# Patient Record
Sex: MALE | Race: WHITE | NOT HISPANIC OR LATINO | Employment: OTHER | URBAN - METROPOLITAN AREA
[De-identification: names, ages, dates, MRNs, and addresses within clinical notes are randomized per-mention and may not be internally consistent; named-entity substitution may affect disease eponyms.]

---

## 2017-01-03 ENCOUNTER — HOSPITAL ENCOUNTER (OUTPATIENT)
Dept: LAB | Facility: MEDICAL CENTER | Age: 82
End: 2017-01-03
Attending: UROLOGY
Payer: MEDICARE

## 2017-01-03 LAB
ALBUMIN SERPL BCP-MCNC: 4.3 G/DL (ref 3.2–4.9)
ALBUMIN/GLOB SERPL: 2 G/DL
ALP SERPL-CCNC: 74 U/L (ref 30–99)
ALT SERPL-CCNC: 16 U/L (ref 2–50)
ANION GAP SERPL CALC-SCNC: 6 MMOL/L (ref 0–11.9)
AST SERPL-CCNC: 21 U/L (ref 12–45)
BASOPHILS # BLD AUTO: 0.05 K/UL (ref 0–0.12)
BASOPHILS NFR BLD AUTO: 1.2 % (ref 0–1.8)
BILIRUB SERPL-MCNC: 0.4 MG/DL (ref 0.1–1.5)
BUN SERPL-MCNC: 16 MG/DL (ref 8–22)
CALCIUM SERPL-MCNC: 9.6 MG/DL (ref 8.5–10.5)
CHLORIDE SERPL-SCNC: 105 MMOL/L (ref 96–112)
CO2 SERPL-SCNC: 30 MMOL/L (ref 20–33)
CREAT SERPL-MCNC: 0.99 MG/DL (ref 0.5–1.4)
EOSINOPHIL # BLD: 0.21 K/UL (ref 0–0.51)
EOSINOPHIL NFR BLD AUTO: 4.9 % (ref 0–6.9)
ERYTHROCYTE [DISTWIDTH] IN BLOOD BY AUTOMATED COUNT: 43.7 FL (ref 35.9–50)
GLOBULIN SER CALC-MCNC: 2.2 G/DL (ref 1.9–3.5)
GLUCOSE SERPL-MCNC: 80 MG/DL (ref 65–99)
HCT VFR BLD AUTO: 39.7 % (ref 42–52)
HGB BLD-MCNC: 13 G/DL (ref 14–18)
IMM GRANULOCYTES # BLD AUTO: 0.01 K/UL (ref 0–0.11)
IMM GRANULOCYTES NFR BLD AUTO: 0.2 % (ref 0–0.9)
LYMPHOCYTES # BLD: 0.6 K/UL (ref 1–4.8)
LYMPHOCYTES NFR BLD AUTO: 14 % (ref 22–41)
MCH RBC QN AUTO: 31.8 PG (ref 27–33)
MCHC RBC AUTO-ENTMCNC: 32.7 G/DL (ref 33.7–35.3)
MCV RBC AUTO: 97.1 FL (ref 81.4–97.8)
MONOCYTES # BLD: 0.58 K/UL (ref 0–0.85)
MONOCYTES NFR BLD AUTO: 13.5 % (ref 0–13.4)
NEUTROPHILS # BLD: 2.84 K/UL (ref 1.82–7.42)
NEUTROPHILS NFR BLD AUTO: 66.2 % (ref 44–72)
NRBC # BLD AUTO: 0 K/UL
NRBC BLD-RTO: 0 /100 WBC
PLATELET # BLD AUTO: 191 K/UL (ref 164–446)
PMV BLD AUTO: 9.5 FL (ref 9–12.9)
POTASSIUM SERPL-SCNC: 4.2 MMOL/L (ref 3.6–5.5)
PROT SERPL-MCNC: 6.5 G/DL (ref 6–8.2)
PSA SERPL DL<=0.01 NG/ML-MCNC: 0.02 NG/ML (ref 0–4)
RBC # BLD AUTO: 4.09 M/UL (ref 4.7–6.1)
SODIUM SERPL-SCNC: 141 MMOL/L (ref 135–145)
WBC # BLD AUTO: 4.3 K/UL (ref 4.8–10.8)

## 2017-01-03 PROCEDURE — 85025 COMPLETE CBC W/AUTO DIFF WBC: CPT

## 2017-01-03 PROCEDURE — 36415 COLL VENOUS BLD VENIPUNCTURE: CPT

## 2017-01-03 PROCEDURE — 84153 ASSAY OF PSA TOTAL: CPT

## 2017-01-03 PROCEDURE — 80053 COMPREHEN METABOLIC PANEL: CPT

## 2017-02-15 ENCOUNTER — HOSPITAL ENCOUNTER (OUTPATIENT)
Dept: RADIOLOGY | Facility: MEDICAL CENTER | Age: 82
End: 2017-02-15
Attending: NURSE PRACTITIONER
Payer: MEDICARE

## 2017-02-15 ENCOUNTER — OFFICE VISIT (OUTPATIENT)
Dept: MEDICAL GROUP | Facility: MEDICAL CENTER | Age: 82
End: 2017-02-15
Payer: MEDICARE

## 2017-02-15 VITALS
HEART RATE: 66 BPM | WEIGHT: 148 LBS | BODY MASS INDEX: 25.27 KG/M2 | TEMPERATURE: 97.4 F | HEIGHT: 64 IN | DIASTOLIC BLOOD PRESSURE: 78 MMHG | RESPIRATION RATE: 16 BRPM | SYSTOLIC BLOOD PRESSURE: 122 MMHG | OXYGEN SATURATION: 95 %

## 2017-02-15 DIAGNOSIS — G89.29 CHRONIC PAIN OF RIGHT KNEE: ICD-10-CM

## 2017-02-15 DIAGNOSIS — M25.561 CHRONIC PAIN OF RIGHT KNEE: ICD-10-CM

## 2017-02-15 DIAGNOSIS — Z95.0 CARDIAC PACEMAKER IN SITU: ICD-10-CM

## 2017-02-15 DIAGNOSIS — Z85.46 HISTORY OF PROSTATE CANCER: ICD-10-CM

## 2017-02-15 PROCEDURE — 1036F TOBACCO NON-USER: CPT | Performed by: NURSE PRACTITIONER

## 2017-02-15 PROCEDURE — G8432 DEP SCR NOT DOC, RNG: HCPCS | Performed by: NURSE PRACTITIONER

## 2017-02-15 PROCEDURE — 4040F PNEUMOC VAC/ADMIN/RCVD: CPT | Performed by: NURSE PRACTITIONER

## 2017-02-15 PROCEDURE — 99213 OFFICE O/P EST LOW 20 MIN: CPT | Performed by: NURSE PRACTITIONER

## 2017-02-15 PROCEDURE — G8482 FLU IMMUNIZE ORDER/ADMIN: HCPCS | Performed by: NURSE PRACTITIONER

## 2017-02-15 PROCEDURE — 1101F PT FALLS ASSESS-DOCD LE1/YR: CPT | Performed by: NURSE PRACTITIONER

## 2017-02-15 PROCEDURE — G8420 CALC BMI NORM PARAMETERS: HCPCS | Performed by: NURSE PRACTITIONER

## 2017-02-15 PROCEDURE — 73564 X-RAY EXAM KNEE 4 OR MORE: CPT | Mod: RT

## 2017-02-15 NOTE — PROGRESS NOTES
"Subjective:      Chico Cedillo is a 84 y.o. male who presents with Other            Other    Chico Cedillo is a patient of Dr. Tanner here today for new problem with right knee pain.      1. Chronic pain of right knee  Patient reports he believes symptoms started about 5 months ago without history of trauma. He states the episodes of knee pain are \"infrequent\" and he could not tell me how often her or if they are sharp, dull or achy pains. 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 The pain is mostly on the anterior portion of the patella. He cannot say how long the pain lasts. He is here mostly today because his family advised him to do so. He states it does not give out or lock up on him. There has been no redness or swelling. The pain does not radiate into the leg. He is not currently having symptoms.    2. History of prostate cancer  Patient reports he continues to see  in urology on a quarterly basis. Lab work is being done by their office.    3. Cardiac pacemaker in situ  Patient follows up with his cardiologist for pacemaker checks regularly. He reports no problems with this.    No current outpatient prescriptions on file.     No current facility-administered medications for this visit.     Social History   Substance Use Topics   • Smoking status: Never Smoker    • Smokeless tobacco: Never Used   • Alcohol Use: No     Family History   Problem Relation Age of Onset   • Heart Disease Father      Past Medical History   Diagnosis Date   • Cancer ([facility])      PROSTATE   • Diarrhea        Review of Systems   Musculoskeletal: Positive for joint pain.   All other systems reviewed and are negative.         Objective:     /78 mmHg  Pulse 66  Temp(Src) 36.3 °C (97.4 °F)  Resp 16  Ht 1.626 m (5' 4.02\")  Wt 67.132 kg (148 lb)  BMI 25.39 kg/m2  SpO2 95%     Physical Exam   Constitutional: He is oriented to person, place, and time. He appears " well-developed and well-nourished. No distress.   HENT:   Head: Normocephalic and atraumatic.   Right Ear: External ear normal.   Left Ear: External ear normal.   Nose: Nose normal.   Mouth/Throat: Oropharynx is clear and moist.   Eyes: Conjunctivae are normal. Right eye exhibits no discharge. Left eye exhibits no discharge.   Neck: Normal range of motion. Neck supple. No tracheal deviation present. No thyromegaly present.   Cardiovascular: Normal rate, regular rhythm and normal heart sounds.    No murmur heard.  Pulmonary/Chest: Effort normal and breath sounds normal. No respiratory distress. He has no wheezes. He has no rales.   Musculoskeletal:        Right knee: He exhibits normal range of motion, no swelling and no effusion. No tenderness found.   Patient points to his anterior patellar area as to where the pain occurs but there is no problems with examination of the leg and patella today.   Lymphadenopathy:     He has no cervical adenopathy.   Neurological: He is alert and oriented to person, place, and time. Coordination normal.   Skin: Skin is warm and dry. No rash noted. He is not diaphoretic. No erythema.   Psychiatric: He has a normal mood and affect. His behavior is normal. Judgment and thought content normal.   Nursing note and vitals reviewed.              Assessment/Plan:     1. Chronic pain of right knee  I discussed options with patient and he will go downstairs today for x-ray which I expect will show some arthritis. I reviewed with him options including physical therapy or referral to orthopedics to look into injections but he declined both. I advised him then to at least rest the knee more often when he is working since this seems to be the trigger. He may also try ice and elevation. He may use over-the-counter Aleve or ibuprofen but not on a regular basis because of his age. He will consider further treatment options if symptoms worsen.  - DX-KNEE COMPLETE 4+ RIGHT; Future    2. History of  prostate cancer  Patient will continue to follow with his urologist on a quarterly basis.    3. Cardiac pacemaker in situ  Patient will continue to follow with cardiology.

## 2017-02-15 NOTE — MR AVS SNAPSHOT
"Chico Cedillo   2/15/2017 8:20 AM   Office Visit   MRN: [id number]    Department:  [address] [place] [address]   Dept Phone:  [phone]    Description:  Male : 1932   Provider:  LAZARUS LozanoPANSON.           Reason for Visit     Other knee problem, right one       Allergies as of 2/15/2017     Allergen Noted Reactions    Nkda [No Known Drug Allergy] 2008         You were diagnosed with     Chronic pain of right knee   [4071903]       History of prostate cancer   [063894]         Vital Signs     Blood Pressure Pulse Temperature Respirations Height Weight    122/78 mmHg 66 36.3 °C (97.4 °F) 16 1.626 m (5' 4.02\") 67.132 kg (148 lb)    Body Mass Index Oxygen Saturation Smoking Status             25.39 kg/m2 95% Never Smoker          Basic Information     Date Of Birth Sex Race Ethnicity Preferred Language    1932 Male White Unknown English      Problem List              ICD-10-CM Priority Class Noted - Resolved    Cardiac pacemaker in situ Z95.0   2012 - Present    History of prostate cancer Z85.46   10/4/2016 - Present    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   10/4/2016 - Present      Health Maintenance        Date Due Completion Dates    IMM ZOSTER VACCINE 2018 (Originally 1992) ---    IMM PNEUMOCOCCAL 65+ (ADULT) LOW/MEDIUM RISK SERIES (2 of 2 - PPSV23) 2017, 2015    IMM DTaP/Tdap/Td Vaccine (2 - Td) 10/3/2021 10/3/2011            Current Immunizations     13-VALENT PCV PREVNAR 2016, 2015    Influenza Vaccine Adult HD 10/19/2005, 10/13/2004    Influenza Vaccine Quad Inj (Pf) 2013, 2010    Influenza Vaccine Quad Inj (Preserved) 2016, 10/7/2015, 2014, 10/4/2012, 10/3/2011    Tdap Vaccine 10/3/2011      Below and/or attached are the medications your provider expects you to take. Review all of your home medications and newly ordered medications with your provider and/or pharmacist. Follow medication instructions as directed by your provider and/or " pharmacist. Please keep your medication list with you and share with your provider. Update the information when medications are discontinued, doses are changed, or new medications (including over-the-counter products) are added; and carry medication information at all times in the event of emergency situations     Allergies:  NKDA - (reactions not documented)               Medications  Valid as of: February 15, 2017 -  8:23 AM    Generic Name Brand Name Tablet Size Instructions for use    .                 Medicines prescribed today were sent to:     [id number] - [place], [place] - [address]    [address] [facility] [address]    Phone: [phone] Fax: [phone]    Open 24 Hours?: No      Medication refill instructions:       If your prescription bottle indicates you have medication refills left, it is not necessary to call your provider’s office. Please contact your pharmacy and they will refill your medication.    If your prescription bottle indicates you do not have any refills left, you may request refills at any time through one of the following ways: The online [employer] system (except Urgent Care), by calling your provider’s office, or by asking your pharmacy to contact your provider’s office with a refill request. Medication refills are processed only during regular business hours and may not be available until the next business day. Your provider may request additional information or to have a follow-up visit with you prior to refilling your medication.   *Please Note: Medication refills are assigned a new Rx number when refilled electronically. Your pharmacy may indicate that no refills were authorized even though a new prescription for the same medication is available at the pharmacy. Please request the medicine by name with the pharmacy before contacting your provider for a refill.        Your To Do List     Future Labs/Procedures Complete By Expires    DX-KNEE COMPLETE 4+ RIGHT  As directed  8/18/2017         Cert[employer] Access Code: Activation code not generated  Current Certess Status: Active

## 2017-03-02 ENCOUNTER — HOSPITAL ENCOUNTER (OUTPATIENT)
Dept: LAB | Facility: MEDICAL CENTER | Age: 82
End: 2017-03-02
Attending: UROLOGY
Payer: MEDICARE

## 2017-03-02 LAB — PSA SERPL DL<=0.01 NG/ML-MCNC: 0.01 NG/ML (ref 0–4)

## 2017-03-02 PROCEDURE — 36415 COLL VENOUS BLD VENIPUNCTURE: CPT

## 2017-03-02 PROCEDURE — 84153 ASSAY OF PSA TOTAL: CPT

## 2017-06-05 ENCOUNTER — HOSPITAL ENCOUNTER (OUTPATIENT)
Dept: LAB | Facility: MEDICAL CENTER | Age: 82
End: 2017-06-05
Attending: UROLOGY
Payer: MEDICARE

## 2017-06-05 LAB — PSA SERPL-MCNC: 0.84 NG/ML (ref 0–4)

## 2017-06-05 PROCEDURE — 36415 COLL VENOUS BLD VENIPUNCTURE: CPT

## 2017-06-05 PROCEDURE — 84153 ASSAY OF PSA TOTAL: CPT

## 2017-06-30 PROBLEM — D04.21 CARCINOMA IN SITU OF SKIN OF RIGHT EAR AND EXTERNAL AURICULAR CANAL: Status: ACTIVE | Noted: 2017-06-30

## 2017-07-14 PROBLEM — D49.2 NEOPLASM OF UNSPECIFIED BEHAVIOR OF BONE, SOFT TISSUE, AND SKIN: Status: RESOLVED | Noted: 2017-06-30 | Resolved: 2017-07-14

## 2017-08-28 PROBLEM — C44.222 SQUAMOUS CELL CARCINOMA OF SKIN OF RIGHT EAR AND EXTERNAL AURICULAR CANAL: Status: ACTIVE | Noted: 2017-08-28

## 2017-08-29 ENCOUNTER — HOSPITAL ENCOUNTER (OUTPATIENT)
Dept: LAB | Facility: MEDICAL CENTER | Age: 82
End: 2017-08-29
Attending: PHYSICIAN ASSISTANT
Payer: MEDICARE

## 2017-08-29 LAB — PSA SERPL-MCNC: 2.71 NG/ML (ref 0–4)

## 2017-08-29 PROCEDURE — 36415 COLL VENOUS BLD VENIPUNCTURE: CPT | Mod: GA

## 2017-08-29 PROCEDURE — 84153 ASSAY OF PSA TOTAL: CPT | Mod: GA

## 2017-09-25 ENCOUNTER — APPOINTMENT (RX ONLY)
Dept: URBAN - METROPOLITAN AREA CLINIC 36 | Facility: CLINIC | Age: 82
Setting detail: DERMATOLOGY
End: 2017-09-25

## 2017-09-25 DIAGNOSIS — Z48.817 ENCOUNTER FOR SURGICAL AFTERCARE FOLLOWING SURGERY ON THE SKIN AND SUBCUTANEOUS TISSUE: ICD-10-CM

## 2017-09-25 PROCEDURE — ? POST-OP WOUND EVALUATION

## 2017-09-25 PROCEDURE — 99024 POSTOP FOLLOW-UP VISIT: CPT

## 2017-09-25 ASSESSMENT — LOCATION SIMPLE DESCRIPTION DERM: LOCATION SIMPLE: RIGHT EAR

## 2017-09-25 ASSESSMENT — LOCATION ZONE DERM: LOCATION ZONE: EAR

## 2017-09-25 ASSESSMENT — LOCATION DETAILED DESCRIPTION DERM: LOCATION DETAILED: RIGHT SCAPHA

## 2017-09-25 NOTE — PROCEDURE: POST-OP WOUND EVALUATION
Wound Evaluated By (Optional): Dr. Madden
Wound Diameter In Cm(Optional): 0
Detail Level: Detailed
Add 79347 Cpt? (Important Note: In 2017 The Use Of 09277 Is Being Tracked By Cms To Determine Future Global Period Reimbursement For Global Periods): yes
Body Location Override (Optional): Mohs site right anti helix

## 2017-10-09 ENCOUNTER — HOSPITAL ENCOUNTER (OUTPATIENT)
Dept: LAB | Facility: MEDICAL CENTER | Age: 82
End: 2017-10-09
Attending: UROLOGY
Payer: MEDICARE

## 2017-10-09 LAB — PSA SERPL-MCNC: 3.32 NG/ML (ref 0–4)

## 2017-10-09 PROCEDURE — 36415 COLL VENOUS BLD VENIPUNCTURE: CPT

## 2017-10-09 PROCEDURE — 84153 ASSAY OF PSA TOTAL: CPT

## 2018-04-25 ENCOUNTER — HOSPITAL ENCOUNTER (OUTPATIENT)
Dept: LAB | Facility: MEDICAL CENTER | Age: 83
End: 2018-04-25
Attending: RADIOLOGY
Payer: MEDICARE

## 2018-04-25 LAB — PSA SERPL-MCNC: 5.15 NG/ML (ref 0–4)

## 2018-04-25 PROCEDURE — 36415 COLL VENOUS BLD VENIPUNCTURE: CPT

## 2018-04-25 PROCEDURE — 84153 ASSAY OF PSA TOTAL: CPT

## 2018-07-23 ENCOUNTER — HOSPITAL ENCOUNTER (OUTPATIENT)
Dept: LAB | Facility: MEDICAL CENTER | Age: 83
End: 2018-07-23
Attending: INTERNAL MEDICINE
Payer: MEDICARE

## 2018-07-23 LAB
ALBUMIN SERPL BCP-MCNC: 4.1 G/DL (ref 3.2–4.9)
ALBUMIN/GLOB SERPL: 1.7 G/DL
ALP SERPL-CCNC: 77 U/L (ref 30–99)
ALT SERPL-CCNC: 12 U/L (ref 2–50)
ANION GAP SERPL CALC-SCNC: 5 MMOL/L (ref 0–11.9)
AST SERPL-CCNC: 18 U/L (ref 12–45)
BASOPHILS # BLD AUTO: 1 % (ref 0–1.8)
BASOPHILS # BLD: 0.04 K/UL (ref 0–0.12)
BILIRUB SERPL-MCNC: 0.5 MG/DL (ref 0.1–1.5)
BUN SERPL-MCNC: 17 MG/DL (ref 8–22)
CALCIUM SERPL-MCNC: 8.9 MG/DL (ref 8.5–10.5)
CHLORIDE SERPL-SCNC: 108 MMOL/L (ref 96–112)
CHOLEST SERPL-MCNC: 139 MG/DL (ref 100–199)
CO2 SERPL-SCNC: 29 MMOL/L (ref 20–33)
CREAT SERPL-MCNC: 1.05 MG/DL (ref 0.5–1.4)
EOSINOPHIL # BLD AUTO: 0.3 K/UL (ref 0–0.51)
EOSINOPHIL NFR BLD: 7.5 % (ref 0–6.9)
ERYTHROCYTE [DISTWIDTH] IN BLOOD BY AUTOMATED COUNT: 47.4 FL (ref 35.9–50)
GLOBULIN SER CALC-MCNC: 2.4 G/DL (ref 1.9–3.5)
GLUCOSE SERPL-MCNC: 89 MG/DL (ref 65–99)
HCT VFR BLD AUTO: 40.3 % (ref 42–52)
HDLC SERPL-MCNC: 38 MG/DL
HGB BLD-MCNC: 13.4 G/DL (ref 14–18)
IMM GRANULOCYTES # BLD AUTO: 0.01 K/UL (ref 0–0.11)
IMM GRANULOCYTES NFR BLD AUTO: 0.3 % (ref 0–0.9)
LDLC SERPL CALC-MCNC: 81 MG/DL
LYMPHOCYTES # BLD AUTO: 0.61 K/UL (ref 1–4.8)
LYMPHOCYTES NFR BLD: 15.3 % (ref 22–41)
MCH RBC QN AUTO: 32.1 PG (ref 27–33)
MCHC RBC AUTO-ENTMCNC: 33.3 G/DL (ref 33.7–35.3)
MCV RBC AUTO: 96.6 FL (ref 81.4–97.8)
MONOCYTES # BLD AUTO: 0.75 K/UL (ref 0–0.85)
MONOCYTES NFR BLD AUTO: 18.8 % (ref 0–13.4)
NEUTROPHILS # BLD AUTO: 2.28 K/UL (ref 1.82–7.42)
NEUTROPHILS NFR BLD: 57.1 % (ref 44–72)
NRBC # BLD AUTO: 0 K/UL
NRBC BLD-RTO: 0 /100 WBC
PLATELET # BLD AUTO: 167 K/UL (ref 164–446)
PMV BLD AUTO: 9.8 FL (ref 9–12.9)
POTASSIUM SERPL-SCNC: 4 MMOL/L (ref 3.6–5.5)
PROT SERPL-MCNC: 6.5 G/DL (ref 6–8.2)
RBC # BLD AUTO: 4.17 M/UL (ref 4.7–6.1)
SODIUM SERPL-SCNC: 142 MMOL/L (ref 135–145)
TRIGL SERPL-MCNC: 101 MG/DL (ref 0–149)
WBC # BLD AUTO: 4 K/UL (ref 4.8–10.8)

## 2018-07-23 PROCEDURE — 36415 COLL VENOUS BLD VENIPUNCTURE: CPT

## 2018-07-23 PROCEDURE — 80061 LIPID PANEL: CPT

## 2018-07-23 PROCEDURE — 85025 COMPLETE CBC W/AUTO DIFF WBC: CPT

## 2018-07-23 PROCEDURE — 80053 COMPREHEN METABOLIC PANEL: CPT

## 2018-11-21 ENCOUNTER — HOSPITAL ENCOUNTER (OUTPATIENT)
Dept: LAB | Facility: MEDICAL CENTER | Age: 83
End: 2018-11-21
Attending: RADIOLOGY
Payer: MEDICARE

## 2018-11-21 LAB — PSA SERPL-MCNC: 6.95 NG/ML (ref 0–4)

## 2018-11-21 PROCEDURE — 36415 COLL VENOUS BLD VENIPUNCTURE: CPT

## 2018-11-21 PROCEDURE — 84153 ASSAY OF PSA TOTAL: CPT

## 2018-12-17 ENCOUNTER — HOSPITAL ENCOUNTER (OUTPATIENT)
Dept: LAB | Facility: MEDICAL CENTER | Age: 83
End: 2018-12-17
Attending: UROLOGY
Payer: MEDICARE

## 2018-12-17 LAB
PSA SERPL-MCNC: 6.88 NG/ML (ref 0–4)
TESTOST SERPL-MCNC: 458 NG/DL (ref 175–781)

## 2018-12-17 PROCEDURE — 84153 ASSAY OF PSA TOTAL: CPT

## 2018-12-17 PROCEDURE — 84403 ASSAY OF TOTAL TESTOSTERONE: CPT

## 2018-12-17 PROCEDURE — 36415 COLL VENOUS BLD VENIPUNCTURE: CPT

## 2018-12-20 ENCOUNTER — HOSPITAL ENCOUNTER (OUTPATIENT)
Dept: RADIOLOGY | Facility: MEDICAL CENTER | Age: 83
End: 2018-12-20
Attending: UROLOGY
Payer: MEDICARE

## 2018-12-20 DIAGNOSIS — C61 MALIGNANT NEOPLASM OF PROSTATE (HCC): ICD-10-CM

## 2018-12-20 DIAGNOSIS — Z19.1 MALIGNANT NEOPLASM WITH HORMONE SENSITIVE STATUS: ICD-10-CM

## 2018-12-20 PROCEDURE — 78306 BONE IMAGING WHOLE BODY: CPT

## 2019-05-16 ENCOUNTER — HOSPITAL ENCOUNTER (OUTPATIENT)
Dept: RADIOLOGY | Facility: MEDICAL CENTER | Age: 84
End: 2019-05-16
Attending: INTERNAL MEDICINE | Admitting: INTERNAL MEDICINE
Payer: MEDICARE

## 2019-05-16 DIAGNOSIS — Z01.811 PRE-OPERATIVE RESPIRATORY EXAMINATION: ICD-10-CM

## 2019-05-16 DIAGNOSIS — Z01.812 PRE-OPERATIVE LABORATORY EXAMINATION: ICD-10-CM

## 2019-05-16 LAB
ANION GAP SERPL CALC-SCNC: 6 MMOL/L (ref 0–11.9)
APTT PPP: 30.8 SEC (ref 24.7–36)
BASOPHILS # BLD AUTO: 1.2 % (ref 0–1.8)
BASOPHILS # BLD: 0.05 K/UL (ref 0–0.12)
BUN SERPL-MCNC: 18 MG/DL (ref 8–22)
CALCIUM SERPL-MCNC: 8.8 MG/DL (ref 8.5–10.5)
CHLORIDE SERPL-SCNC: 108 MMOL/L (ref 96–112)
CO2 SERPL-SCNC: 27 MMOL/L (ref 20–33)
CREAT SERPL-MCNC: 1.11 MG/DL (ref 0.5–1.4)
EOSINOPHIL # BLD AUTO: 0.45 K/UL (ref 0–0.51)
EOSINOPHIL NFR BLD: 11.2 % (ref 0–6.9)
ERYTHROCYTE [DISTWIDTH] IN BLOOD BY AUTOMATED COUNT: 46.2 FL (ref 35.9–50)
GLUCOSE SERPL-MCNC: 82 MG/DL (ref 65–99)
HCT VFR BLD AUTO: 41.6 % (ref 42–52)
HGB BLD-MCNC: 13.9 G/DL (ref 14–18)
IMM GRANULOCYTES # BLD AUTO: 0.01 K/UL (ref 0–0.11)
IMM GRANULOCYTES NFR BLD AUTO: 0.2 % (ref 0–0.9)
INR PPP: 1.03 (ref 0.87–1.13)
LYMPHOCYTES # BLD AUTO: 0.89 K/UL (ref 1–4.8)
LYMPHOCYTES NFR BLD: 22.1 % (ref 22–41)
MCH RBC QN AUTO: 32.4 PG (ref 27–33)
MCHC RBC AUTO-ENTMCNC: 33.4 G/DL (ref 33.7–35.3)
MCV RBC AUTO: 97 FL (ref 81.4–97.8)
MONOCYTES # BLD AUTO: 0.48 K/UL (ref 0–0.85)
MONOCYTES NFR BLD AUTO: 11.9 % (ref 0–13.4)
NEUTROPHILS # BLD AUTO: 2.15 K/UL (ref 1.82–7.42)
NEUTROPHILS NFR BLD: 53.4 % (ref 44–72)
NRBC # BLD AUTO: 0 K/UL
NRBC BLD-RTO: 0 /100 WBC
PLATELET # BLD AUTO: 181 K/UL (ref 164–446)
PMV BLD AUTO: 9 FL (ref 9–12.9)
POTASSIUM SERPL-SCNC: 4.2 MMOL/L (ref 3.6–5.5)
PROTHROMBIN TIME: 13.6 SEC (ref 12–14.6)
RBC # BLD AUTO: 4.29 M/UL (ref 4.7–6.1)
SODIUM SERPL-SCNC: 141 MMOL/L (ref 135–145)
WBC # BLD AUTO: 4 K/UL (ref 4.8–10.8)

## 2019-05-16 PROCEDURE — 85025 COMPLETE CBC W/AUTO DIFF WBC: CPT

## 2019-05-16 PROCEDURE — 71046 X-RAY EXAM CHEST 2 VIEWS: CPT

## 2019-05-16 PROCEDURE — 85610 PROTHROMBIN TIME: CPT

## 2019-05-16 PROCEDURE — 85730 THROMBOPLASTIN TIME PARTIAL: CPT

## 2019-05-16 PROCEDURE — 80048 BASIC METABOLIC PNL TOTAL CA: CPT

## 2019-05-16 PROCEDURE — 36415 COLL VENOUS BLD VENIPUNCTURE: CPT

## 2019-05-21 ENCOUNTER — HOSPITAL ENCOUNTER (OUTPATIENT)
Facility: MEDICAL CENTER | Age: 84
End: 2019-05-21
Attending: INTERNAL MEDICINE | Admitting: INTERNAL MEDICINE
Payer: MEDICARE

## 2019-05-21 ENCOUNTER — APPOINTMENT (OUTPATIENT)
Dept: CARDIOLOGY | Facility: MEDICAL CENTER | Age: 84
End: 2019-05-21
Attending: INTERNAL MEDICINE
Payer: MEDICARE

## 2019-05-21 VITALS
OXYGEN SATURATION: 89 % | TEMPERATURE: 98 F | SYSTOLIC BLOOD PRESSURE: 119 MMHG | HEART RATE: 67 BPM | WEIGHT: 147.71 LBS | HEIGHT: 64 IN | RESPIRATION RATE: 16 BRPM | BODY MASS INDEX: 25.22 KG/M2 | DIASTOLIC BLOOD PRESSURE: 63 MMHG

## 2019-05-21 DIAGNOSIS — T82.111A MALFUNCTION OF CARDIAC PACEMAKER, INITIAL ENCOUNTER: ICD-10-CM

## 2019-05-21 PROCEDURE — 160002 HCHG RECOVERY MINUTES (STAT)

## 2019-05-21 PROCEDURE — 305387 CL-PERMANENT PACEMAKER GEN CHANGE

## 2019-05-21 PROCEDURE — 700101 HCHG RX REV CODE 250

## 2019-05-21 PROCEDURE — 700111 HCHG RX REV CODE 636 W/ 250 OVERRIDE (IP)

## 2019-05-21 RX ORDER — LIDOCAINE HYDROCHLORIDE 20 MG/ML
INJECTION, SOLUTION INFILTRATION; PERINEURAL
Status: COMPLETED
Start: 2019-05-21 | End: 2019-05-21

## 2019-05-21 RX ORDER — CEFAZOLIN SODIUM 1 G/3ML
INJECTION, POWDER, FOR SOLUTION INTRAMUSCULAR; INTRAVENOUS
Status: COMPLETED
Start: 2019-05-21 | End: 2019-05-21

## 2019-05-21 RX ORDER — MIDAZOLAM HYDROCHLORIDE 1 MG/ML
INJECTION INTRAMUSCULAR; INTRAVENOUS
Status: COMPLETED
Start: 2019-05-21 | End: 2019-05-21

## 2019-05-21 RX ADMIN — LIDOCAINE HYDROCHLORIDE: 20 INJECTION, SOLUTION INFILTRATION; PERINEURAL at 08:20

## 2019-05-21 RX ADMIN — CEFAZOLIN 2000 MG: 330 INJECTION, POWDER, FOR SOLUTION INTRAMUSCULAR; INTRAVENOUS at 08:19

## 2019-05-21 RX ADMIN — MIDAZOLAM HYDROCHLORIDE 1 MG: 1 INJECTION, SOLUTION INTRAMUSCULAR; INTRAVENOUS at 08:23

## 2019-05-21 RX ADMIN — FENTANYL CITRATE 75 MCG: 50 INJECTION, SOLUTION INTRAMUSCULAR; INTRAVENOUS at 08:23

## 2019-05-21 NOTE — OP REPORT
DATE OF SERVICE:  05/21/2019    PROCEDURE:  Replacement of existing dual chamber pacemaker generator.    PREOPERATIVE DIAGNOSIS:  End of service of existing generator.    POSTOPERATIVE DIAGNOSES:  Status post placement of same, underlying rhythm,   intermittent complete heart block.    SURGEON:  Sanjiv Samuel MD    ANESTHETIC USE:  1 mg intravenous Versed, 75 mg intravenous fentanyl.    INDICATIONS:  The patient is an 86-year-old gentleman whom I have followed for   many years.  He had a pacemaker placed several years ago.  He was found to be   at end of service of existing generator and therefore, was brought for   replacement of said generator.    DESCRIPTION OF PROCEDURE:  Informed consent was obtained.  The patient was   brought to the cardiac catheterization laboratory.  He was placed in recumbent   position.  The entire pectoral region was prepped and draped sterilely.    A 1% lidocaine was used for local infiltration.  Using a 15 blade, a straight   incision was made and the generator was exposed.  The threshold of the   existing atrial and ventricular leads were excellent.  The right atrial   threshold was 0.5 volts with 360 ohms impedance and a threshold of 1.0 volts.    The right ventricular lead demonstrated a threshold of 1.4 volts with an   impedance of 767 ohms.  These were felt to be excellent.  The new generator,   which was a [employer] device, model name Opalityentio DR IS-1, model   #L101, serial #[id number].  This generator was connected to the leads.  The leads   and generator were placed in the pocket.  The pocket was rinsed with sterile   solution of Ancef antibiotic.  The sponge and needle count was correct.    Therefore, the subcutaneous tissue was closed using 3-0 Vicryl.  The skin was   closed using 4-0 Vicryl with a running subcuticular stitch.  There were no   complications.    ESTIMATED BLOOD LOSS:  Approximately 5-7 mL and the patient tolerated the   procedure well.        ____________________________________     MD GEORGES CHOWDARY    DD:  05/21/2019 08:34:08  DT:  05/21/2019 08:45:05    D#:  [id number]  Job#:  [id number]

## 2019-05-21 NOTE — DISCHARGE INSTRUCTIONS
ACTIVITY: Rest and take it easy for the first 24 hours.  A responsible adult is recommended to remain with you during that time.  It is normal to feel sleepy.  We encourage you to not do anything that requires balance, judgment or coordination.    MILD FLU-LIKE SYMPTOMS ARE NORMAL. YOU MAY EXPERIENCE GENERALIZED MUSCLE ACHES, THROAT IRRITATION, HEADACHE AND/OR SOME NAUSEA.    FOR 24 HOURS DO NOT:  Drive, operate machinery or run household appliances.  Drink beer or alcoholic beverages.   Make important decisions or sign legal documents.    SPECIAL INSTRUCTIONS: *KEEP INCISION DRY FOR 7 DAYS.**    DIET: To avoid nausea, slowly advance diet as tolerated, avoiding spicy or greasy foods for the first day.  Add more substantial food to your diet according to your physician's instructions.  Babies can be fed formula or breast milk as soon as they are hungry.  INCREASE FLUIDS AND FIBER TO AVOID CONSTIPATION.    SURGICAL DRESSING/BATHING: *NO FULL SHOWER FOR 7 DAYS**    FOLLOW-UP APPOINTMENT:  A follow-up appointment should be arranged with your doctor in *[place] [phone]  **; call to schedule.    You should CALL YOUR PHYSICIAN if you develop:  Fever greater than 101 degrees F.  Pain not relieved by medication, or persistent nausea or vomiting.  Excessive bleeding (blood soaking through dressing) or unexpected drainage from the wound.  Extreme redness or swelling around the incision site, drainage of pus or foul smelling drainage.  Inability to urinate or empty your bladder within 8 hours.  Problems with breathing or chest pain.    You should call 911 if you develop problems with breathing or chest pain.  If you are unable to contact your doctor or surgical center, you should go to the nearest emergency room or urgent care center.  Physician's telephone #: *[phone]**    If any questions arise, call your doctor.  If your doctor is not available, please feel free to call the Surgical Center at [phone].  The Center  is open Monday through Friday from 7AM to 7PM.  You can also call the HEALTH HOTLINE open 24 hours/day, 7 days/week and speak to a nurse at [phone], or toll free at [phone].    A registered nurse may call you a few days after your surgery to see how you are doing after your procedure.    MEDICATIONS: Resume taking daily medication.  Take prescribed pain medication with food.  If no medication is prescribed, you may take non-aspirin pain medication if needed.  PAIN MEDICATION CAN BE VERY CONSTIPATING.  Take a stool softener or laxative such as senokot, pericolace, or milk of magnesia if needed.      If your physician has prescribed pain medication that includes Acetaminophen (Tylenol), do not take additional Acetaminophen (Tylenol) while taking the prescribed medication.    Depression / Suicide Risk    As you are discharged from this [facility] facility, it is important to learn how to keep safe from harming yourself.    Recognize the warning signs:  · Abrupt changes in personality, positive or negative- including increase in energy   · Giving away possessions  · Change in eating patterns- significant weight changes-  positive or negative  · Change in sleeping patterns- unable to sleep or sleeping all the time   · Unwillingness or inability to communicate  · Depression  · Unusual sadness, discouragement and loneliness  · Talk of wanting to die  · Neglect of personal appearance   · Rebelliousness- reckless behavior  · Withdrawal from people/activities they love  · Confusion- inability to concentrate     If you or a loved one observes any of these behaviors or has concerns about self-harm, here's what you can do:  · Talk about it- your feelings and reasons for harming yourself  · Remove any means that you might use to hurt yourself (examples: pills, rope, extension cords, firearm)  · Get professional help from the community (Mental Health, Substance Abuse, psychological counseling)  · Do not be  alone:Call your Safe Contact- someone whom you trust who will be there for you.  · Call your local CRISIS HOTLINE 902-1986 or 321-130-7565  · Call your local Children's Mobile Crisis Response Team [place] (439) 835-2283 or www.SCYNEXIS  · Call the toll free National Suicide Prevention Hotlines   · National Suicide Prevention Lifeline 046-527-CBHO (9124)  · National [employer] Line Network 800-SUICIDE (568-0732)

## 2019-05-21 NOTE — OR NURSING
0845   PT RECEIVED FROM CATH LAB,  S/P GEN CHANGED.  LEFT SC WITH DRESSING INTACT.  DENIES ANY PAIN.  SON IS @ BEDSIDE.    0900   INCISION IS CLEAR.  PT TAKING PO FLUID WELL.    1010  Pt UP TO BATHROOM TO VOID.   INCISION IS CLEAR.  DC INSTRUCTIONS GIVEN TO PT AND SON.  VERBALIZED UNDERSTANDING OF ALL.  HL DC.  PT DC TO HOME,  VIA WHEELCHAIR,  ESCORTED OUT BY CNA.

## 2019-06-20 ENCOUNTER — HOSPITAL ENCOUNTER (OUTPATIENT)
Dept: LAB | Facility: MEDICAL CENTER | Age: 84
End: 2019-06-20
Attending: UROLOGY
Payer: MEDICARE

## 2019-06-20 LAB — PSA SERPL-MCNC: 9.46 NG/ML (ref 0–4)

## 2019-06-20 PROCEDURE — 84153 ASSAY OF PSA TOTAL: CPT | Mod: GA

## 2019-06-20 PROCEDURE — 36415 COLL VENOUS BLD VENIPUNCTURE: CPT | Mod: GA

## 2019-11-19 ENCOUNTER — HOSPITAL ENCOUNTER (OUTPATIENT)
Dept: RADIOLOGY | Facility: MEDICAL CENTER | Age: 84
End: 2019-11-19
Attending: INTERNAL MEDICINE
Payer: MEDICARE

## 2019-11-19 ENCOUNTER — OFFICE VISIT (OUTPATIENT)
Dept: MEDICAL GROUP | Facility: MEDICAL CENTER | Age: 84
End: 2019-11-19
Payer: MEDICARE

## 2019-11-19 VITALS
HEIGHT: 64 IN | BODY MASS INDEX: 25.27 KG/M2 | HEART RATE: 64 BPM | TEMPERATURE: 98 F | OXYGEN SATURATION: 95 % | DIASTOLIC BLOOD PRESSURE: 62 MMHG | WEIGHT: 148 LBS | SYSTOLIC BLOOD PRESSURE: 120 MMHG

## 2019-11-19 DIAGNOSIS — Z95.0 CARDIAC PACEMAKER IN SITU: ICD-10-CM

## 2019-11-19 DIAGNOSIS — M54.6 ACUTE MIDLINE THORACIC BACK PAIN: ICD-10-CM

## 2019-11-19 DIAGNOSIS — Z00.00 MEDICARE ANNUAL WELLNESS VISIT, SUBSEQUENT: ICD-10-CM

## 2019-11-19 DIAGNOSIS — Z85.46 HISTORY OF PROSTATE CANCER: ICD-10-CM

## 2019-11-19 PROCEDURE — G0439 PPPS, SUBSEQ VISIT: HCPCS | Performed by: INTERNAL MEDICINE

## 2019-11-19 PROCEDURE — 99213 OFFICE O/P EST LOW 20 MIN: CPT | Mod: 25 | Performed by: INTERNAL MEDICINE

## 2019-11-19 PROCEDURE — 72072 X-RAY EXAM THORAC SPINE 3VWS: CPT

## 2019-11-19 ASSESSMENT — ACTIVITIES OF DAILY LIVING (ADL): BATHING_REQUIRES_ASSISTANCE: 0

## 2019-11-19 ASSESSMENT — PATIENT HEALTH QUESTIONNAIRE - PHQ9: CLINICAL INTERPRETATION OF PHQ2 SCORE: 0

## 2019-11-19 ASSESSMENT — ENCOUNTER SYMPTOMS: GENERAL WELL-BEING: EXCELLENT

## 2019-11-19 NOTE — PROGRESS NOTES
CC: Back pain, due for wellness examination.    HPI:   Chico presents today with the following.      1. Medicare annual wellness visit, subsequent  Screenings performed below information given on advanced directives    2. Acute midline thoracic back pain  Presents with approximately 1 week of mid back pain.  He reports he was twisting to put something into the car and felt an acute pop in his mid back pain was 8 out of 10 in intensity.  He is been using ice and resting and since that time its improved by 75%.  Still somewhat painful when first standing.  Reports deep breath also slightly uncomfortable denies any shortness of breath.  No other associated symptoms.        Information for advance directives given to patient or instructed to bring in advance directives into to office to put in chart.      Depression Screening    Little interest or pleasure in doing things?  0 - not at all  Feeling down, depressed , or hopeless? 0 - not at all  Patient Health Questionnaire Score: 0     If depressive symptoms identified deferred to follow up visit unless specifically addressed in assessment and plan.    Interpretation of PHQ-9 Total Score   Score Severity   1-4 No Depression   5-9 Mild Depression   10-14 Moderate Depression   15-19 Moderately Severe Depression   20-27 Severe Depression    Screening for Cognitive Impairment    Three Minute Recall (village, kitchen, baby) 2/3    Jorge clock face with all 12 numbers and set the hands to show 10 past 10.  Yes 4/5  Cognitive concerns identified deferred for follow up unless specifically addressed in assessment and plan.    Fall Risk Assessment    Has the patient had two or more falls in the last year or any fall with injury in the last year?  No    Safety Assessment    Throw rugs on floor.  Yes  Handrails on all stairs.  No  Good lighting in all hallways.  Yes  Difficulty hearing.  Yes  Patient counseled about all safety risks that were identified.    Functional  Assessment ADLs    Are there any barriers preventing you from cooking for yourself or meeting nutritional needs?  No.    Are there any barriers preventing you from driving safely or obtaining transportation?  No.    Are there any barriers preventing you from using a telephone or calling for help?  No.    Are there any barriers preventing you from shopping?  No.    Are there any barriers preventing you from taking care of your own finances?  No.    Are there any barriers preventing you from managing your medications?  No.    Are there any barriers preventing you from showering, bathing or dressing yourself?  No.    Are you currently engaging in any exercise or physical activity?  Yes.     What is your perception of your health?  Excellent.      Health Maintenance Summary                Annual Wellness Visit Overdue 10/5/2017      Done 10/4/2016 Visit Dx: Medicare annual wellness visit, subsequent    IMM ZOSTER VACCINES Postponed 4/19/2020 Originally 8/27/1982. System: vaccine not available, other system reasons    IMM DTaP/Tdap/Td Vaccine Next Due 10/3/2021      Done 10/3/2011 Imm Admin: Tdap Vaccine          Patient Care Team:  Manuel Tanner M.D. as PCP - General (Internal Medicine)  Delmar Tejeda M.D. as Consulting Physician (Urology)  Silvio Sherman M.D. as Consulting Physician (Radiation Therapy)  Sanjiv Samuel M.D. as Consulting Physician (Cardiology)            Health Care Screening: Age-appropriate preventive services that Medicare covers were discussed today and ordered as indicated and agreed upon by the patient, and as recommended by USPTF and ACIP.     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     No current facility-administered medications for this visit.        Family History   Problem Relation Age of Onset   • Heart  "Disease Father        Social History     Socioeconomic History   • Marital status:      Spouse name: Not on file   • Number of children: Not on file   • Years of education: Not on file   • Highest education level: Not on file   Occupational History   • Not on file   Social Needs   • Financial resource strain: Not on file   • Food insecurity:     Worry: Not on file     Inability: Not on file   • Transportation needs:     Medical: Not on file     Non-medical: Not on file   Tobacco Use   • Smoking status: Never Smoker   • Smokeless tobacco: Never Used   Substance and Sexual Activity   • Alcohol use: No     Alcohol/week: 0.0 oz   • Drug use: No   • Sexual activity: Not Currently   Lifestyle   • Physical activity:     Days per week: Not on file     Minutes per session: Not on file   • Stress: Not on file   Relationships   • Social connections:     Talks on phone: Not on file     Gets together: Not on file     Attends Bahai service: Not on file     Active member of club or organization: Not on file     Attends meetings of clubs or organizations: Not on file     Relationship status: Not on file   • Intimate partner violence:     Fear of current or ex partner: Not on file     Emotionally abused: Not on file     Physically abused: Not on file     Forced sexual activity: Not on file   Other Topics Concern   • Not on file   Social History Narrative   • Not on file       Past Surgical History:   Procedure Laterality Date   • PACEMAKER INSERTION         Allergies as of 11/19/2019 - Reviewed 11/19/2019   Allergen Reaction Noted   • Nkda [no known drug allergy]  09/14/2008        ROS: Denies Chest pain, SOB, LE edema.    /62 (BP Location: Left arm, Patient Position: Sitting)   Pulse 64   Temp 36.7 °C (98 °F)   Ht 1.626 m (5' 4\")   Wt 67.1 kg (148 lb)   SpO2 95%   BMI 25.40 kg/m²     Physical Exam:  Gen:         Alert and oriented, No apparent distress.  Neck:        No Lymphadenopathy or Bruits.  Lungs:     " Clear to auscultation bilaterally  CV:          Regular rate and rhythm. No murmurs, rubs or gallops.  Abd:         Soft non tender, non distended. Normal active bowel sounds.  No  Hepatosplenomegaly, No pulsatile masses.                   Ext:          No clubbing, cyanosis, edema.      Assessment and Plan.   87 y.o. male with the following issues.    1. Medicare annual wellness visit, subsequent  Discussed healthy lifestyle habits as well as screening regimens.  - Subsequent Annual Wellness Visit - Includes PPPS ()    2. Acute midline thoracic back pain  Concerns for possible compression fracture especially in the setting of a history of prostate cancer have written for x-rays.  If there is a compression fracture he cannot have MRI unfortunately.  If no signs of fracture and pain persist will recommend physical therapy  - DX-THORACIC SPINE-2 VIEWS; Future    3. History of prostate cancer  PSA is gone up slightly followed by radiation oncology.  - Subsequent Annual Wellness Visit - Includes PPPS ()    4. Cardiac pacemaker in situ  Follow with cardiology  - Subsequent [facility] - [facility]        Referrals offered: Community-based lifestyle interventions to reduce health risks and promote self-management and wellness, fall prevention, nutrition, physical activity, tobacco-use cessation, weight loss, and mental health services as per orders if indicated.    Discussion today about general wellness and lifestyle habits:    · Prevent falls and reduce trip hazards; Cautioned about securing or removing rugs.  · Have a working fire alarm and carbon monoxide detector;   · Engage in regular physical activity and social activities

## 2019-12-04 ENCOUNTER — OFFICE VISIT (OUTPATIENT)
Dept: MEDICAL GROUP | Facility: MEDICAL CENTER | Age: 84
End: 2019-12-04
Payer: MEDICARE

## 2019-12-04 VITALS
DIASTOLIC BLOOD PRESSURE: 66 MMHG | BODY MASS INDEX: 24.92 KG/M2 | HEIGHT: 64 IN | HEART RATE: 72 BPM | OXYGEN SATURATION: 97 % | WEIGHT: 146 LBS | SYSTOLIC BLOOD PRESSURE: 118 MMHG | TEMPERATURE: 97.4 F

## 2019-12-04 DIAGNOSIS — C61 PROSTATE CANCER (HCC): ICD-10-CM

## 2019-12-04 DIAGNOSIS — S22.060D COMPRESSION FRACTURE OF T8 VERTEBRA WITH ROUTINE HEALING, SUBSEQUENT ENCOUNTER: ICD-10-CM

## 2019-12-04 PROCEDURE — 99214 OFFICE O/P EST MOD 30 MIN: CPT | Performed by: INTERNAL MEDICINE

## 2019-12-04 NOTE — PROGRESS NOTES
"      CC: Compression fracture.                                                                                                                                      HPI:   Chico presents today with the following.    1. Compression fracture of T8 vertebra with routine healing, subsequent encounter  Presents after being seen for acute onset of back pain while lifting an object in the thoracic spine.  He was sent for x-ray showing a T8 vertebral compression fracture that appears to be new from last year.  Discussing pain in that region he reports it is improved since his last visit.  Of concern 6 months ago his PSA was up almost 4 points.  He is followed by urology no recent note.  Also he has pacemaker in place.    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     No current facility-administered medications for this visit.          Allergies as of 12/04/2019 - Reviewed 12/04/2019   Allergen Reaction Noted   • Nkda [no known drug allergy]  09/14/2008        ROS: Denies Chest pain, SOB, LE edema.    /66 (BP Location: Left arm, Patient Position: Sitting)   Pulse 72   Temp 36.3 °C (97.4 °F)   Ht 1.626 m (5' 4\")   Wt 66.2 kg (146 lb)   SpO2 97%   BMI 25.06 kg/m²     Physical Exam:  Gen:         Alert and oriented, No apparent distress.  Neck:        No Lymphadenopathy or Bruits.  Lungs:     Clear to auscultation bilaterally  CV:          Regular rate and rhythm. No murmurs, rubs or gallops.               Ext:          No clubbing, cyanosis, edema.      Assessment and Plan.   87 y.o. male with the following issues.    1. Compression fracture of T8 vertebra with routine healing, subsequent encounter  Pacemaker is old enough likely unable to be imaged with MRI.  Discussion with radiologist would recommend bone scan given that his pain is not " to the degree recommending kyphoplasty.  Will discuss with urologist and likely order bone bone scan after discussion.

## 2019-12-20 ENCOUNTER — OFFICE VISIT (OUTPATIENT)
Dept: MEDICAL GROUP | Facility: MEDICAL CENTER | Age: 84
End: 2019-12-20
Payer: MEDICARE

## 2019-12-20 VITALS
HEART RATE: 65 BPM | DIASTOLIC BLOOD PRESSURE: 54 MMHG | HEIGHT: 64 IN | BODY MASS INDEX: 24.92 KG/M2 | SYSTOLIC BLOOD PRESSURE: 112 MMHG | OXYGEN SATURATION: 96 % | WEIGHT: 146 LBS | TEMPERATURE: 97.4 F

## 2019-12-20 DIAGNOSIS — S22.000A COMPRESSION FRACTURE OF BODY OF THORACIC VERTEBRA (HCC): ICD-10-CM

## 2019-12-20 PROCEDURE — 99213 OFFICE O/P EST LOW 20 MIN: CPT | Performed by: INTERNAL MEDICINE

## 2019-12-20 NOTE — PROGRESS NOTES
"      CC: Follow-up compression fracture                                                                                                                                      HPI:   Chico presents today with the following.    1. Compression fracture of body of thoracic vertebra (HCC)  Presents with a history of prostate cancer discussion was with urologist given the elevated PSA we are sending for a bone density scan.  Patient has not as of yet scheduled.  Reports his pain is increasing slightly with activity.  He is sleeping well however but activity does make it painful.  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     No current facility-administered medications for this visit.          Allergies as of 12/20/2019 - Reviewed 12/20/2019   Allergen Reaction Noted   • Nkda [no known drug allergy]  09/14/2008        ROS: Denies Chest pain, SOB, LE edema.    /54 (BP Location: Right arm, Patient Position: Sitting)   Pulse 65   Temp 36.3 °C (97.4 °F)   Ht 1.626 m (5' 4\")   Wt 66.2 kg (146 lb)   SpO2 96%   BMI 25.06 kg/m²     Physical Exam:  Gen:         Alert and oriented, No apparent distress.  Neck:        No Lymphadenopathy or Bruits.  Lungs:     Clear to auscultation bilaterally  CV:          Regular rate and rhythm. No murmurs, rubs or gallops.               Ext:          No clubbing, cyanosis, edema.      Assessment and Plan.   87 y.o. male with the following issues.    1. Compression fracture of body of thoracic vertebra (HCC)  Discussion about possible malignancy related will await nuc med study he will follow-up with urology.      "

## 2020-01-15 ENCOUNTER — HOSPITAL ENCOUNTER (OUTPATIENT)
Dept: RADIOLOGY | Facility: MEDICAL CENTER | Age: 85
End: 2020-01-15
Attending: INTERNAL MEDICINE
Payer: MEDICARE

## 2020-01-15 DIAGNOSIS — S22.060D COMPRESSION FRACTURE OF T8 VERTEBRA WITH ROUTINE HEALING, SUBSEQUENT ENCOUNTER: ICD-10-CM

## 2020-01-15 DIAGNOSIS — C61 PROSTATE CANCER (HCC): ICD-10-CM

## 2020-01-15 PROCEDURE — A9503 TC99M MEDRONATE: HCPCS

## 2020-01-22 ENCOUNTER — HOSPITAL ENCOUNTER (OUTPATIENT)
Dept: LAB | Facility: MEDICAL CENTER | Age: 85
End: 2020-01-22
Attending: UROLOGY
Payer: MEDICARE

## 2020-01-22 PROCEDURE — 36415 COLL VENOUS BLD VENIPUNCTURE: CPT | Mod: GA

## 2020-01-22 PROCEDURE — 84153 ASSAY OF PSA TOTAL: CPT | Mod: GA

## 2020-01-23 LAB — PSA SERPL-MCNC: 11.95 NG/ML (ref 0–4)

## 2020-01-27 ENCOUNTER — OFFICE VISIT (OUTPATIENT)
Dept: MEDICAL GROUP | Facility: MEDICAL CENTER | Age: 85
End: 2020-01-27
Payer: MEDICARE

## 2020-01-27 VITALS
HEART RATE: 66 BPM | TEMPERATURE: 97 F | SYSTOLIC BLOOD PRESSURE: 112 MMHG | OXYGEN SATURATION: 98 % | WEIGHT: 142 LBS | HEIGHT: 64 IN | BODY MASS INDEX: 24.24 KG/M2 | DIASTOLIC BLOOD PRESSURE: 56 MMHG

## 2020-01-27 DIAGNOSIS — M54.9 CHRONIC BACK PAIN, UNSPECIFIED BACK LOCATION, UNSPECIFIED BACK PAIN LATERALITY: ICD-10-CM

## 2020-01-27 DIAGNOSIS — G89.29 CHRONIC BACK PAIN, UNSPECIFIED BACK LOCATION, UNSPECIFIED BACK PAIN LATERALITY: ICD-10-CM

## 2020-01-27 PROCEDURE — 99213 OFFICE O/P EST LOW 20 MIN: CPT | Performed by: INTERNAL MEDICINE

## 2020-01-27 NOTE — PROGRESS NOTES
"      CC: Back pain in the setting of prostate cancer.                                                                                                                                      HPI:   Chico presents today with the following.    1. Chronic back pain, unspecified back location, unspecified back pain laterality  PSA elevated he did have a compression fracture cannot get MRI.  Recent bone density does not show any cancer related issue.  Discussing pain with him today he denies any.  Cannot truly tell me that he has midline back pain in the area of concern for the compression fracture.  Today does not have any pain but does describe some low back issues as well as upper back but again nothing thoracic spine in nature.  Overall symptoms seem to be intermittent and tolerable.      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     No current facility-administered medications for this visit.          Allergies as of 01/27/2020 - Reviewed 01/27/2020   Allergen Reaction Noted   • Nkda [no known drug allergy]  09/14/2008        ROS: Denies Chest pain, SOB, LE edema.    /56 (BP Location: Right arm, Patient Position: Sitting)   Pulse 66   Temp 36.1 °C (97 °F)   Ht 1.626 m (5' 4\")   Wt 64.4 kg (142 lb)   SpO2 98%   BMI 24.37 kg/m²     Physical Exam:  Gen:         Alert and oriented, No apparent distress.  Neck:        No Lymphadenopathy or Bruits.  Lungs:     Clear to auscultation bilaterally  CV:          Regular rate and rhythm. No murmurs, rubs or gallops.               Ext:          No clubbing, cyanosis, edema.      Assessment and Plan.   87 y.o. male with the following issues.    1. Chronic back pain, unspecified back location, unspecified back pain laterality  Given symptomology do not believe this is related to the compression " fracture we will manage expectantly will follow-up if it should worsen.

## 2020-04-09 ENCOUNTER — HOSPITAL ENCOUNTER (OUTPATIENT)
Dept: LAB | Facility: MEDICAL CENTER | Age: 85
End: 2020-04-09
Attending: INTERNAL MEDICINE
Payer: MEDICARE

## 2020-04-09 LAB
ALBUMIN SERPL BCP-MCNC: 4.4 G/DL (ref 3.2–4.9)
ALBUMIN/GLOB SERPL: 2.1 G/DL
ALP SERPL-CCNC: 75 U/L (ref 30–99)
ALT SERPL-CCNC: 17 U/L (ref 2–50)
ANION GAP SERPL CALC-SCNC: 11 MMOL/L (ref 7–16)
AST SERPL-CCNC: 21 U/L (ref 12–45)
BILIRUB SERPL-MCNC: 0.5 MG/DL (ref 0.1–1.5)
BUN SERPL-MCNC: 21 MG/DL (ref 8–22)
CALCIUM SERPL-MCNC: 9.3 MG/DL (ref 8.5–10.5)
CHLORIDE SERPL-SCNC: 106 MMOL/L (ref 96–112)
CO2 SERPL-SCNC: 26 MMOL/L (ref 20–33)
CREAT SERPL-MCNC: 1.21 MG/DL (ref 0.5–1.4)
GLOBULIN SER CALC-MCNC: 2.1 G/DL (ref 1.9–3.5)
GLUCOSE SERPL-MCNC: 142 MG/DL (ref 65–99)
POTASSIUM SERPL-SCNC: 4.4 MMOL/L (ref 3.6–5.5)
PROT SERPL-MCNC: 6.5 G/DL (ref 6–8.2)
SODIUM SERPL-SCNC: 143 MMOL/L (ref 135–145)

## 2020-04-09 PROCEDURE — 80053 COMPREHEN METABOLIC PANEL: CPT

## 2020-04-09 PROCEDURE — 36415 COLL VENOUS BLD VENIPUNCTURE: CPT

## 2020-04-28 ENCOUNTER — HOSPITAL ENCOUNTER (OUTPATIENT)
Dept: LAB | Facility: MEDICAL CENTER | Age: 85
End: 2020-04-28
Attending: UROLOGY
Payer: MEDICARE

## 2020-04-28 LAB — PSA SERPL-MCNC: 0.71 NG/ML (ref 0–4)

## 2020-04-28 PROCEDURE — 84153 ASSAY OF PSA TOTAL: CPT

## 2020-04-28 PROCEDURE — 36415 COLL VENOUS BLD VENIPUNCTURE: CPT

## 2020-08-03 ENCOUNTER — HOSPITAL ENCOUNTER (OUTPATIENT)
Dept: LAB | Facility: MEDICAL CENTER | Age: 85
End: 2020-08-03
Attending: INTERNAL MEDICINE
Payer: MEDICARE

## 2020-08-03 ENCOUNTER — HOSPITAL ENCOUNTER (OUTPATIENT)
Dept: LAB | Facility: MEDICAL CENTER | Age: 85
End: 2020-08-03
Attending: UROLOGY
Payer: MEDICARE

## 2020-08-03 LAB
ALBUMIN SERPL BCP-MCNC: 4.1 G/DL (ref 3.2–4.9)
ALBUMIN/GLOB SERPL: 1.9 G/DL
ALP SERPL-CCNC: 72 U/L (ref 30–99)
ALT SERPL-CCNC: 18 U/L (ref 2–50)
ANION GAP SERPL CALC-SCNC: 11 MMOL/L (ref 7–16)
AST SERPL-CCNC: 21 U/L (ref 12–45)
BILIRUB SERPL-MCNC: 0.5 MG/DL (ref 0.1–1.5)
BUN SERPL-MCNC: 19 MG/DL (ref 8–22)
CALCIUM SERPL-MCNC: 9.3 MG/DL (ref 8.5–10.5)
CHLORIDE SERPL-SCNC: 106 MMOL/L (ref 96–112)
CHOLEST SERPL-MCNC: 180 MG/DL (ref 100–199)
CO2 SERPL-SCNC: 26 MMOL/L (ref 20–33)
CREAT SERPL-MCNC: 1.09 MG/DL (ref 0.5–1.4)
FASTING STATUS PATIENT QL REPORTED: NORMAL
GLOBULIN SER CALC-MCNC: 2.2 G/DL (ref 1.9–3.5)
GLUCOSE SERPL-MCNC: 96 MG/DL (ref 65–99)
HDLC SERPL-MCNC: 47 MG/DL
LDLC SERPL CALC-MCNC: 113 MG/DL
POTASSIUM SERPL-SCNC: 4.4 MMOL/L (ref 3.6–5.5)
PROT SERPL-MCNC: 6.3 G/DL (ref 6–8.2)
PSA SERPL-MCNC: 0.15 NG/ML (ref 0–4)
SODIUM SERPL-SCNC: 143 MMOL/L (ref 135–145)
TRIGL SERPL-MCNC: 102 MG/DL (ref 0–149)

## 2020-08-03 PROCEDURE — 84153 ASSAY OF PSA TOTAL: CPT

## 2020-08-03 PROCEDURE — 80053 COMPREHEN METABOLIC PANEL: CPT

## 2020-08-03 PROCEDURE — 80061 LIPID PANEL: CPT | Mod: GA

## 2020-08-03 PROCEDURE — 36415 COLL VENOUS BLD VENIPUNCTURE: CPT | Mod: GA

## 2020-10-21 ENCOUNTER — HOSPITAL ENCOUNTER (OUTPATIENT)
Dept: LAB | Facility: MEDICAL CENTER | Age: 85
End: 2020-10-21
Attending: INTERNAL MEDICINE
Payer: MEDICARE

## 2020-10-21 LAB
ALBUMIN SERPL BCP-MCNC: 4.2 G/DL (ref 3.2–4.9)
ALBUMIN/GLOB SERPL: 2.2 G/DL
ALP SERPL-CCNC: 70 U/L (ref 30–99)
ALT SERPL-CCNC: 16 U/L (ref 2–50)
ANION GAP SERPL CALC-SCNC: 8 MMOL/L (ref 7–16)
AST SERPL-CCNC: 17 U/L (ref 12–45)
BILIRUB SERPL-MCNC: 0.3 MG/DL (ref 0.1–1.5)
BUN SERPL-MCNC: 21 MG/DL (ref 8–22)
CALCIUM SERPL-MCNC: 9.3 MG/DL (ref 8.5–10.5)
CHLORIDE SERPL-SCNC: 105 MMOL/L (ref 96–112)
CO2 SERPL-SCNC: 27 MMOL/L (ref 20–33)
CREAT SERPL-MCNC: 0.99 MG/DL (ref 0.5–1.4)
FASTING STATUS PATIENT QL REPORTED: NORMAL
GLOBULIN SER CALC-MCNC: 1.9 G/DL (ref 1.9–3.5)
GLUCOSE SERPL-MCNC: 110 MG/DL (ref 65–99)
POTASSIUM SERPL-SCNC: 3.9 MMOL/L (ref 3.6–5.5)
PROT SERPL-MCNC: 6.1 G/DL (ref 6–8.2)
SODIUM SERPL-SCNC: 140 MMOL/L (ref 135–145)

## 2020-10-21 PROCEDURE — 36415 COLL VENOUS BLD VENIPUNCTURE: CPT

## 2020-10-21 PROCEDURE — 80053 COMPREHEN METABOLIC PANEL: CPT

## 2020-11-03 ENCOUNTER — HOSPITAL ENCOUNTER (OUTPATIENT)
Dept: LAB | Facility: MEDICAL CENTER | Age: 85
End: 2020-11-03
Attending: UROLOGY
Payer: MEDICARE

## 2020-11-03 LAB — PSA SERPL-MCNC: 0.11 NG/ML (ref 0–4)

## 2020-11-03 PROCEDURE — 36415 COLL VENOUS BLD VENIPUNCTURE: CPT

## 2020-11-03 PROCEDURE — 84153 ASSAY OF PSA TOTAL: CPT

## 2020-12-04 ENCOUNTER — OFFICE VISIT (OUTPATIENT)
Dept: MEDICAL GROUP | Facility: PHYSICIAN GROUP | Age: 85
End: 2020-12-04
Payer: MEDICARE

## 2020-12-04 VITALS
SYSTOLIC BLOOD PRESSURE: 132 MMHG | OXYGEN SATURATION: 99 % | WEIGHT: 139 LBS | TEMPERATURE: 97.3 F | BODY MASS INDEX: 23.73 KG/M2 | HEART RATE: 64 BPM | DIASTOLIC BLOOD PRESSURE: 60 MMHG | RESPIRATION RATE: 12 BRPM | HEIGHT: 64 IN

## 2020-12-04 DIAGNOSIS — E78.5 DYSLIPIDEMIA: ICD-10-CM

## 2020-12-04 DIAGNOSIS — Z85.46 HISTORY OF PROSTATE CANCER: ICD-10-CM

## 2020-12-04 DIAGNOSIS — Z23 NEED FOR VACCINATION: ICD-10-CM

## 2020-12-04 DIAGNOSIS — S22.060D COMPRESSION FRACTURE OF T8 VERTEBRA WITH ROUTINE HEALING, SUBSEQUENT ENCOUNTER: ICD-10-CM

## 2020-12-04 DIAGNOSIS — Z95.0 CARDIAC PACEMAKER IN SITU: ICD-10-CM

## 2020-12-04 PROBLEM — S22.060A COMPRESSION FRACTURE OF T8 VERTEBRA (HCC): Status: ACTIVE | Noted: 2020-12-04

## 2020-12-04 PROCEDURE — 99213 OFFICE O/P EST LOW 20 MIN: CPT | Performed by: PHYSICIAN ASSISTANT

## 2020-12-04 ASSESSMENT — PATIENT HEALTH QUESTIONNAIRE - PHQ9: CLINICAL INTERPRETATION OF PHQ2 SCORE: 0

## 2020-12-04 ASSESSMENT — FIBROSIS 4 INDEX: FIB4 SCORE: 2.07

## 2020-12-04 NOTE — ASSESSMENT & PLAN NOTE
Xray 2019-   IMPRESSION:     Marked wedge compression T8 vertebral body acuity uncertain.    Still has back pain- elected to not have it fixed at the time. Takes Aleve as needed.

## 2020-12-04 NOTE — ASSESSMENT & PLAN NOTE
Followed by cardiology.   Current cardiologist: Dr. Samuel, reviewed consult notes from 08/20. Stable condition.

## 2020-12-04 NOTE — PROGRESS NOTES
CC:   Chief Complaint   Patient presents with   • Establish Care   • Prostate Cancer          HISTORY OF PRESENT ILLNESS: Patient is a 88 y.o. male established patient who presents today to establish care with me and discuss the following issues:      Health Maintenance: Completed  States he received flu shot.   Print Shingrix.     History of prostate cancer  History of prostate cancer, followed by Dr. Tejeda, PSA from November 3, 2020 reviewed and was at 0.11.     Dyslipidemia  This is a chronic condition.   Latest Labs:   Lab Results   Component Value Date/Time    CHOLSTRLTOT 180 08/03/2020 09:46 AM     (H) 08/03/2020 09:46 AM    HDL 47 08/03/2020 09:46 AM    TRIGLYCERIDE 102 08/03/2020 09:46 AM    No medications. Borderline elevation.     Cardiac pacemaker in situ  Followed by cardiology.   Current cardiologist: Dr. Samuel, reviewed consult notes from 08/20. Stable condition.     Compression fracture of T8 vertebra (HCC)  Xray 2019-   IMPRESSION:     Marked wedge compression T8 vertebral body acuity uncertain.    Still has back pain- elected to not have it fixed at the time. Takes Aleve as needed.      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 0.5 mL 0     No current facility-administered medications for this visit.        Social History     Tobacco Use   • Smoking status: Never Smoker   • Smokeless tobacco: Never Used   Substance Use Topics   • Alcohol use: No     Alcohol/week: 0.0 oz   • Drug use: No  "    Social History     Social History Narrative   • Not on file       Family History   Problem Relation Age of Onset   • Heart Disease Father        Review of Systems:    Constitutional: No Fevers, Chills  Eyes: No vision changes  ENT: No hearing changes  Resp: No Shortness of breath  CV: No Chest pain  GI: No Nausea/Vomiting  MSK: No weakness  Skin: No rashes  Neuro: No Headaches  Psych: No Suicidal ideations    All remaining systems reviewed and found to be negative, except as stated above.    Exam:    /60   Pulse 64   Temp 36.3 °C (97.3 °F) (Temporal)   Resp 12   Ht 1.626 m (5' 4\")   Wt 63 kg (139 lb)   SpO2 99%  Body mass index is 23.86 kg/m².    General:  Well nourished, well developed male in NAD  HENT: Atraumatic, normocephalic  EYES: Extraocular movements intact  NECK: Supple, FROM  CHEST: No deformities, Equal chest expansion  RESP: Unlabored, no stridor or audible wheeze  HEART: Regular Rate and rhythm.   ABD: Soft, Nontender, Non-Distended  Extremities: No Clubbing, Cyanosis, or Edema  Skin: Warm/dry, without rashes  Neuro: A/O x 4, due to COVID-19- did not have patient remove face mask to test cranial nerves.  Motor/sensory grossly intact  Psych: Normal behavior, normal affect      Lab review:  Labs are reviewed and discussed with a patient  Lab Results   Component Value Date/Time    CHOLSTRLTOT 180 08/03/2020 09:46 AM     (H) 08/03/2020 09:46 AM    HDL 47 08/03/2020 09:46 AM    TRIGLYCERIDE 102 08/03/2020 09:46 AM       Lab Results   Component Value Date/Time    SODIUM 140 10/21/2020 02:43 PM    POTASSIUM 3.9 10/21/2020 02:43 PM    CHLORIDE 105 10/21/2020 02:43 PM    CO2 27 10/21/2020 02:43 PM    GLUCOSE 110 (H) 10/21/2020 02:43 PM    BUN 21 10/21/2020 02:43 PM    CREATININE 0.99 10/21/2020 02:43 PM    CREATININE 1.1 09/17/2008 04:10 AM     Lab Results   Component Value Date/Time    ALKPHOSPHAT 70 10/21/2020 02:43 PM    ASTSGOT 17 10/21/2020 02:43 PM    ALTSGPT 16 10/21/2020 02:43 PM "    TBILIRUBIN 0.3 10/21/2020 02:43 PM      No results found for: HBA1C  No results found for: TSH  Lab Results   Component Value Date/Time    FREET4 0.90 07/20/2016 08:31 AM       Lab Results   Component Value Date/Time    WBC 4.0 (L) 05/16/2019 08:49 AM    RBC 4.29 (L) 05/16/2019 08:49 AM    HEMOGLOBIN 13.9 (L) 05/16/2019 08:49 AM    HEMATOCRIT 41.6 (L) 05/16/2019 08:49 AM    MCV 97.0 05/16/2019 08:49 AM    MCH 32.4 05/16/2019 08:49 AM    MCHC 33.4 (L) 05/16/2019 08:49 AM    MPV 9.0 05/16/2019 08:49 AM    NEUTSPOLYS 53.40 05/16/2019 08:49 AM    LYMPHOCYTES 22.10 05/16/2019 08:49 AM    MONOCYTES 11.90 05/16/2019 08:49 AM    EOSINOPHILS 11.20 (H) 05/16/2019 08:49 AM    BASOPHILS 1.20 05/16/2019 08:49 AM          Assessment/Plan:  1. History of prostate cancer  Chronic condition, stable, followed by Dr. Tejeda.   2. Dyslipidemia  Just slightly elevated, reviewed labs from 08/20, repeat in a years time.   3. Cardiac pacemaker in situ  Chronic condition, stable, followed by Dr. Samuel.   4. Need for vaccination  - Zoster Vac Recomb Adjuvanted (SHINGRIX) 50 MCG/0.5ML Recon Susp; Inject 0.5 mL into the shoulder, thigh, or buttocks one time for 1 dose.  Dispense: 0.5 mL; Refill: 0    5. Compression fracture of T8 vertebra with routine healing, subsequent encounter  Essentially stable, does cause some discomfort, take Aleve just as needed.   Other orders  - Naproxen Sodium (ALEVE PO); Aleve    Discussed with patient, [facility]. Has 2 sons here in [place] who check on him regularly.  x 15 years. He feel he does well at home.       Follow-up: Return in about 8 months (around 8/4/2021) for Follow up on labs from Dr. Samuel. .    Please note that this dictation was created using voice recognition software. I have made every reasonable attempt to correct obvious errors, but I expect that there are errors of grammar and possibly content that I did not discover before finalizing the note.

## 2020-12-04 NOTE — ASSESSMENT & PLAN NOTE
History of prostate cancer, followed by Dr. Tejeda, PSA from November 3, 2020 reviewed and was at 0.11.

## 2020-12-04 NOTE — ASSESSMENT & PLAN NOTE
This is a chronic condition.   Latest Labs:   Lab Results   Component Value Date/Time    CHOLSTRLTOT 180 08/03/2020 09:46 AM     (H) 08/03/2020 09:46 AM    HDL 47 08/03/2020 09:46 AM    TRIGLYCERIDE 102 08/03/2020 09:46 AM    No medications. Borderline elevation.

## 2020-12-18 ENCOUNTER — HOSPITAL ENCOUNTER (OUTPATIENT)
Dept: LAB | Facility: MEDICAL CENTER | Age: 85
End: 2020-12-18
Attending: UROLOGY
Payer: MEDICARE

## 2020-12-18 LAB — PSA SERPL-MCNC: 0.07 NG/ML (ref 0–4)

## 2020-12-18 PROCEDURE — 84153 ASSAY OF PSA TOTAL: CPT

## 2020-12-18 PROCEDURE — 36415 COLL VENOUS BLD VENIPUNCTURE: CPT

## 2021-01-11 DIAGNOSIS — Z23 NEED FOR VACCINATION: ICD-10-CM

## 2021-01-14 ENCOUNTER — IMMUNIZATION (OUTPATIENT)
Dept: FAMILY PLANNING/WOMEN'S HEALTH CLINIC | Facility: IMMUNIZATION CENTER | Age: 86
End: 2021-01-14
Attending: INTERNAL MEDICINE
Payer: MEDICARE

## 2021-01-14 DIAGNOSIS — Z23 ENCOUNTER FOR VACCINATION: Primary | ICD-10-CM

## 2021-01-14 DIAGNOSIS — Z23 NEED FOR VACCINATION: ICD-10-CM

## 2021-01-14 PROCEDURE — 0001A PFIZER SARS-COV-2 VACCINE: CPT

## 2021-01-14 PROCEDURE — 91300 PFIZER SARS-COV-2 VACCINE: CPT

## 2021-01-26 ENCOUNTER — APPOINTMENT (OUTPATIENT)
Dept: ADMISSIONS | Facility: MEDICAL CENTER | Age: 86
End: 2021-01-26
Payer: MEDICARE

## 2021-01-27 ENCOUNTER — APPOINTMENT (OUTPATIENT)
Dept: ADMISSIONS | Facility: MEDICAL CENTER | Age: 86
End: 2021-01-27
Payer: MEDICARE

## 2021-01-27 ENCOUNTER — PRE-ADMISSION TESTING (OUTPATIENT)
Dept: ADMISSIONS | Facility: MEDICAL CENTER | Age: 86
End: 2021-01-27
Attending: OPHTHALMOLOGY
Payer: MEDICARE

## 2021-02-04 ENCOUNTER — IMMUNIZATION (OUTPATIENT)
Dept: FAMILY PLANNING/WOMEN'S HEALTH CLINIC | Facility: IMMUNIZATION CENTER | Age: 86
End: 2021-02-04
Attending: INTERNAL MEDICINE
Payer: MEDICARE

## 2021-02-04 DIAGNOSIS — Z23 ENCOUNTER FOR VACCINATION: Primary | ICD-10-CM

## 2021-02-04 PROCEDURE — 91300 PFIZER SARS-COV-2 VACCINE: CPT

## 2021-02-04 PROCEDURE — 0002A PFIZER SARS-COV-2 VACCINE: CPT

## 2021-02-10 ENCOUNTER — HOSPITAL ENCOUNTER (OUTPATIENT)
Facility: MEDICAL CENTER | Age: 86
End: 2021-02-10
Attending: OPHTHALMOLOGY | Admitting: OPHTHALMOLOGY
Payer: MEDICARE

## 2021-02-10 ENCOUNTER — ANESTHESIA EVENT (OUTPATIENT)
Dept: SURGERY | Facility: MEDICAL CENTER | Age: 86
End: 2021-02-10
Payer: MEDICARE

## 2021-02-10 ENCOUNTER — ANESTHESIA (OUTPATIENT)
Dept: SURGERY | Facility: MEDICAL CENTER | Age: 86
End: 2021-02-10
Payer: MEDICARE

## 2021-02-10 VITALS
SYSTOLIC BLOOD PRESSURE: 127 MMHG | OXYGEN SATURATION: 97 % | TEMPERATURE: 97.7 F | HEART RATE: 65 BPM | BODY MASS INDEX: 23.26 KG/M2 | WEIGHT: 136.24 LBS | RESPIRATION RATE: 16 BRPM | HEIGHT: 64 IN | DIASTOLIC BLOOD PRESSURE: 62 MMHG

## 2021-02-10 PROCEDURE — 160041 HCHG SURGERY MINUTES - EA ADDL 1 MIN LEVEL 4: Performed by: OPHTHALMOLOGY

## 2021-02-10 PROCEDURE — 700101 HCHG RX REV CODE 250: Performed by: OPHTHALMOLOGY

## 2021-02-10 PROCEDURE — 700101 HCHG RX REV CODE 250

## 2021-02-10 PROCEDURE — 160029 HCHG SURGERY MINUTES - 1ST 30 MINS LEVEL 4: Performed by: OPHTHALMOLOGY

## 2021-02-10 PROCEDURE — 160048 HCHG OR STATISTICAL LEVEL 1-5: Performed by: OPHTHALMOLOGY

## 2021-02-10 PROCEDURE — 500855 HCHG NEEDLE, IRRIG CYSTOTOME 27G: Performed by: OPHTHALMOLOGY

## 2021-02-10 PROCEDURE — 160046 HCHG PACU - 1ST 60 MINS PHASE II: Performed by: OPHTHALMOLOGY

## 2021-02-10 PROCEDURE — 160025 RECOVERY II MINUTES (STATS): Performed by: OPHTHALMOLOGY

## 2021-02-10 PROCEDURE — A9270 NON-COVERED ITEM OR SERVICE: HCPCS | Performed by: OPHTHALMOLOGY

## 2021-02-10 PROCEDURE — 501838 HCHG SUTURE GENERAL: Performed by: OPHTHALMOLOGY

## 2021-02-10 PROCEDURE — 700105 HCHG RX REV CODE 258: Performed by: OPHTHALMOLOGY

## 2021-02-10 PROCEDURE — 160009 HCHG ANES TIME/MIN: Performed by: OPHTHALMOLOGY

## 2021-02-10 PROCEDURE — 501749 HCHG SHELL REV 276: Performed by: OPHTHALMOLOGY

## 2021-02-10 PROCEDURE — 500558 HCHG EYE SHIELD W/GARTER (FOX): Performed by: OPHTHALMOLOGY

## 2021-02-10 PROCEDURE — 502704 HCHG DEVICE, LIGASURE IMPACT: Performed by: OPHTHALMOLOGY

## 2021-02-10 PROCEDURE — 700111 HCHG RX REV CODE 636 W/ 250 OVERRIDE (IP): Performed by: ANESTHESIOLOGY

## 2021-02-10 PROCEDURE — 500791 HCHG KNIFE, SLIT: Performed by: OPHTHALMOLOGY

## 2021-02-10 DEVICE — LENS PRE-LOADED TECNIS CLEAR MONO 6.0MM 18D: Type: IMPLANTABLE DEVICE | Status: FUNCTIONAL

## 2021-02-10 RX ORDER — OXYCODONE HCL 5 MG/5 ML
5 SOLUTION, ORAL ORAL
Status: DISCONTINUED | OUTPATIENT
Start: 2021-02-10 | End: 2021-02-10 | Stop reason: HOSPADM

## 2021-02-10 RX ORDER — OXYCODONE HCL 5 MG/5 ML
10 SOLUTION, ORAL ORAL
Status: DISCONTINUED | OUTPATIENT
Start: 2021-02-10 | End: 2021-02-10 | Stop reason: HOSPADM

## 2021-02-10 RX ORDER — TROPICAMIDE 10 MG/ML
SOLUTION/ DROPS OPHTHALMIC
Status: COMPLETED
Start: 2021-02-10 | End: 2021-02-10

## 2021-02-10 RX ORDER — TETRACAINE HYDROCHLORIDE 5 MG/ML
SOLUTION OPHTHALMIC
Status: DISCONTINUED | OUTPATIENT
Start: 2021-02-10 | End: 2021-02-10 | Stop reason: HOSPADM

## 2021-02-10 RX ORDER — HYDRALAZINE HYDROCHLORIDE 20 MG/ML
INJECTION INTRAMUSCULAR; INTRAVENOUS PRN
Status: DISCONTINUED | OUTPATIENT
Start: 2021-02-10 | End: 2021-02-10 | Stop reason: SURG

## 2021-02-10 RX ORDER — TETRACAINE HYDROCHLORIDE 5 MG/ML
SOLUTION OPHTHALMIC
Status: COMPLETED
Start: 2021-02-10 | End: 2021-02-10

## 2021-02-10 RX ORDER — HALOPERIDOL 5 MG/ML
1 INJECTION INTRAMUSCULAR
Status: DISCONTINUED | OUTPATIENT
Start: 2021-02-10 | End: 2021-02-10 | Stop reason: HOSPADM

## 2021-02-10 RX ORDER — LIDOCAINE HYDROCHLORIDE 10 MG/ML
INJECTION, SOLUTION INFILTRATION; PERINEURAL
Status: DISCONTINUED | OUTPATIENT
Start: 2021-02-10 | End: 2021-02-10 | Stop reason: HOSPADM

## 2021-02-10 RX ORDER — MOXIFLOXACIN 5 MG/ML
SOLUTION/ DROPS OPHTHALMIC
Status: COMPLETED
Start: 2021-02-10 | End: 2021-02-10

## 2021-02-10 RX ORDER — MIDAZOLAM HYDROCHLORIDE 1 MG/ML
1 INJECTION INTRAMUSCULAR; INTRAVENOUS
Status: DISCONTINUED | OUTPATIENT
Start: 2021-02-10 | End: 2021-02-10 | Stop reason: HOSPADM

## 2021-02-10 RX ORDER — SODIUM CHLORIDE, SODIUM LACTATE, POTASSIUM CHLORIDE, CALCIUM CHLORIDE 600; 310; 30; 20 MG/100ML; MG/100ML; MG/100ML; MG/100ML
INJECTION, SOLUTION INTRAVENOUS CONTINUOUS
Status: DISCONTINUED | OUTPATIENT
Start: 2021-02-10 | End: 2021-02-10 | Stop reason: HOSPADM

## 2021-02-10 RX ORDER — MOXIFLOXACIN 5 MG/ML
SOLUTION/ DROPS OPHTHALMIC
Status: DISCONTINUED | OUTPATIENT
Start: 2021-02-10 | End: 2021-02-10 | Stop reason: HOSPADM

## 2021-02-10 RX ORDER — MIDAZOLAM HYDROCHLORIDE 1 MG/ML
INJECTION INTRAMUSCULAR; INTRAVENOUS PRN
Status: DISCONTINUED | OUTPATIENT
Start: 2021-02-10 | End: 2021-02-10 | Stop reason: SURG

## 2021-02-10 RX ORDER — CYCLOPENTOLATE HYDROCHLORIDE 10 MG/ML
SOLUTION/ DROPS OPHTHALMIC
Status: COMPLETED
Start: 2021-02-10 | End: 2021-02-10

## 2021-02-10 RX ORDER — LABETALOL HYDROCHLORIDE 5 MG/ML
5 INJECTION, SOLUTION INTRAVENOUS
Status: DISCONTINUED | OUTPATIENT
Start: 2021-02-10 | End: 2021-02-10 | Stop reason: HOSPADM

## 2021-02-10 RX ORDER — MEPERIDINE HYDROCHLORIDE 25 MG/ML
6.25 INJECTION INTRAMUSCULAR; INTRAVENOUS; SUBCUTANEOUS
Status: DISCONTINUED | OUTPATIENT
Start: 2021-02-10 | End: 2021-02-10 | Stop reason: HOSPADM

## 2021-02-10 RX ORDER — DIPHENHYDRAMINE HYDROCHLORIDE 50 MG/ML
12.5 INJECTION INTRAMUSCULAR; INTRAVENOUS
Status: DISCONTINUED | OUTPATIENT
Start: 2021-02-10 | End: 2021-02-10 | Stop reason: HOSPADM

## 2021-02-10 RX ORDER — FLURBIPROFEN SODIUM 0.3 MG/ML
SOLUTION/ DROPS OPHTHALMIC
Status: COMPLETED
Start: 2021-02-10 | End: 2021-02-10

## 2021-02-10 RX ORDER — ONDANSETRON 2 MG/ML
4 INJECTION INTRAMUSCULAR; INTRAVENOUS
Status: DISCONTINUED | OUTPATIENT
Start: 2021-02-10 | End: 2021-02-10 | Stop reason: HOSPADM

## 2021-02-10 RX ORDER — PHENYLEPHRINE HYDROCHLORIDE 25 MG/ML
SOLUTION/ DROPS OPHTHALMIC
Status: COMPLETED
Start: 2021-02-10 | End: 2021-02-10

## 2021-02-10 RX ADMIN — TROPICAMIDE 1 DROP: 10 SOLUTION/ DROPS OPHTHALMIC at 07:38

## 2021-02-10 RX ADMIN — POVIDONE IODINE 15 ML: 100 SOLUTION TOPICAL at 07:38

## 2021-02-10 RX ADMIN — PHENYLEPHRINE HYDROCHLORIDE: 2.5 SOLUTION/ DROPS OPHTHALMIC at 07:37

## 2021-02-10 RX ADMIN — CYCLOPENTOLATE HYDROCHLORIDE 1 DROP: 10 SOLUTION/ DROPS OPHTHALMIC at 07:37

## 2021-02-10 RX ADMIN — FLURBIPROFEN SODIUM: 0.3 SOLUTION/ DROPS OPHTHALMIC at 07:37

## 2021-02-10 RX ADMIN — SODIUM CHLORIDE, POTASSIUM CHLORIDE, SODIUM LACTATE AND CALCIUM CHLORIDE: 600; 310; 30; 20 INJECTION, SOLUTION INTRAVENOUS at 08:27

## 2021-02-10 RX ADMIN — HYDRALAZINE HYDROCHLORIDE 10 MG: 20 INJECTION INTRAMUSCULAR; INTRAVENOUS at 08:54

## 2021-02-10 RX ADMIN — MOXIFLOXACIN HYDROCHLORIDE: 5 SOLUTION/ DROPS OPHTHALMIC at 07:37

## 2021-02-10 RX ADMIN — MIDAZOLAM HYDROCHLORIDE 1 MG: 1 INJECTION, SOLUTION INTRAMUSCULAR; INTRAVENOUS at 08:30

## 2021-02-10 RX ADMIN — TETRACAINE HYDROCHLORIDE: 5 SOLUTION OPHTHALMIC at 07:37

## 2021-02-10 ASSESSMENT — PAIN SCALES - GENERAL: PAIN_LEVEL: 0

## 2021-02-10 ASSESSMENT — FIBROSIS 4 INDEX: FIB4 SCORE: 2.07

## 2021-02-10 NOTE — OR NURSING
0902- Pt arrived from OR. Report received.Right eye shield in place. Pt resting comfortably and denying pain and nausea at this time.    0916- Pt tolerating sips of water. Discharge instructions discussed with pts [facility]. All questions answered.       0932- IV removed.  Pt discharged home to [facility]. All personal belongings with pt.

## 2021-02-10 NOTE — OP REPORT
PREPROCEDURE DIAGNOSIS: nuclear Cataract, rt eye.  POSTPROCEDURE DIAGNOSIS: same. IFIS     PROCEDURE: COMPLEX  Cataract extraction (DUE TO NEED FOR PUPIL EXPANSION DEVICE) by phacoemulsification with intraocular lens implantation, rt eye.     SURGEON: ERICA Saldivar MD  ANESTHESIA: Monitored Anesthesia Care.  BLOOD LOSS: Minimal.  COMPLICATIONS: None.     LENS INFORMATION: MODEL: PCBOO, 18.0  diopters;    SERIAL #: [id number]  TARGET REFRACTION: PLANO     DESCRIPTION OF PROCEDURE:   The patient was met in the pre-operative area and the correct eye was marked. The patient was transported into the operative suite and was positioned on the operating table; monitored anesthesia care was induced. The patient's eye was prepped with Betadine and was sterilely draped in the usual ophthalmic fashion.    A 1 mm angled side-port blade was used to create a paracentesis near the 11 o’clock peripheral cornea using a 0.12 forceps to stabilize the globe. 2% preservative-free intracameral lidocaine was instilled into the anterior chamber followed by viscoelastic. A 2.4 mm keratome was used to create a clear corneal incision temporally using a 0.12 forceps to stabilize the globe. A 7.0 mm Malyugan ring was inserted due to IFIS. A cystotome was introduced into the eye and a capsulorrhexis was initiated. A continuous curvilinear capsulorrhexis was completed using Utrata forceps. BSS on a 27-gauge canula was used to hydro-dissect and hydro-delineate the lens. The phacoemulsification handpiece was introduced through the main wound, followed by a combo chopper through the side-port. The nucleus was fragmented with both a vertical and horizontal chop technique, and the fragments were removed using phacoemulsification. The cortex was removed using the I/A handpiece. The capsular bag was filled with viscoelastic and the lens listed above was placed into the capsular bag. The lens was inserted into the bag without difficulty. The ring was  removed. Viscoelastic was removed using the I/A handpiece. The pupil was round, and the lens was well-centered at the end of the case. The wounds were hydrated with balanced salt solution and were watertight when checked with weck-jacoby sponges. The eye was palpated and found to be of adequate ocular pressure at the end of the case. There were no complications. The eye was cleansed with BSS and the lid speculum was removed. Vigamox was applied, and a clear rigid eye shield was taped in place. The patient departed the operating room in stable condition.

## 2021-02-10 NOTE — ANESTHESIA PREPROCEDURE EVALUATION
88yoM with PMHx of prostate CA, PM in place, HLD,     NKDA  No AC      Relevant Problems   NEURO   (+) History of prostate cancer      CARDIAC   (+) Cardiac pacemaker in situ       Physical Exam    Airway   Mallampati: II       Cardiovascular - normal exam     Dental - normal exam           Pulmonary - normal exam     Abdominal - normal exam     Neurological - normal exam                 Anesthesia Plan    ASA 3   ASA physical status 3 criteria: other (comment)    Plan - MAC               Induction: intravenous    Postoperative Plan: Postoperative administration of opioids is intended.    Pertinent diagnostic labs and testing reviewed    Informed Consent:    Anesthetic plan and risks discussed with patient.

## 2021-02-10 NOTE — DISCHARGE INSTRUCTIONS
HOME CARE INSTRUCTIONS FOR CATARACT SURGERY    ACTIVITY: Rest and take it easy for the first 24 hours. We strongly suggest that a responsible adult remain with you during that time. It is normal to feel sleepy. We encourage you to not do anything that requires balance, judgment or coordination. Be extra careful when walking (with a dilated eye, it is easier to trip and fall).     FOR 24 HOURS, DO NOT:       Drive, operate machinery or run household appliances.        Drink beer or alcoholic beverages.        Make important decisions or sign legal documents.     DIET: To avoid nausea, slowly advance diet as tolerated, avoiding spicy or greasy foods for the first meal.     MEDICATIONS: Resume taking daily medication. You may take Tylenol for mild discomfort, if needed.     SURGICAL DRESSING: Eye shield as instructed by your doctor. Dark glasses should be worn while in the sunlight.     Follow your Physician's instruction Sheet. Eye Kit Given    A follow-up appointment is scheduled with your doctor tomorrow at _______ am/pm.     You should call 911 if you develop problems with breathing or chest pain.  You should CALL YOUR PHYSICIAN if you develop: Sharp stabbing pain or sudden change in vision in your operative eye. If you are unable to contact your doctor or the surgical center, you should go to the nearest emergency room or urgent care center. Physician's telephone #    [phone]     If any questions arise, call your doctor. If your doctor is not available, please feel free to call Same Day Surgery at [phone]. You can also call the [facility] Hotline open 24 hours/day, 7 days/week and speak to a nurse at [phone] or [phone].     I acknowledge receipt and understanding of these Home Care Instructions.    ______________________________     _______________________________            Signature of Patient / Responsible Adult                                                       RN Signature    A  registered nurse may call you a few days after your surgery to see how you are doing.   You may also receive a survey in the mail within the next two weeks and we ask that you take a few moments to complete and return the survey. Our goal is to provide you with very good care and we value your comments. Thank you for choosing [facility].

## 2021-02-10 NOTE — ANESTHESIA TIME REPORT
Anesthesia Start and Stop Event Times     Date Time Event    2/10/2021 0827 Anesthesia Start     0910 Anesthesia Stop        Responsible Staff  02/10/21    Name Role Begin End    Jeny Hanson M.D. 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

## 2021-02-10 NOTE — ANESTHESIA POSTPROCEDURE EVALUATION
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 Responsible Provider: Jeny Hanson M.D.    Anesthesia Type: MAC ASA Status: 3          Final Anesthesia Type: MAC  Last vitals  BP   Blood Pressure : (!) 161/79    Temp   36.5 °C (97.7 °F)    Pulse   68   Resp   16    SpO2   97 %      Anesthesia Post Evaluation    Patient location during evaluation: PACU  Patient participation: complete - patient participated  Level of consciousness: awake and alert  Pain score: 0    Airway patency: patent  Anesthetic complications: no  Cardiovascular status: adequate and hemodynamically stable  Respiratory status: acceptable  Hydration status: acceptable    PONV: none          No complications documented.     Nurse Pain Score: 0 (NPRS)

## 2021-02-20 ENCOUNTER — HOSPITAL ENCOUNTER (OUTPATIENT)
Dept: LAB | Facility: MEDICAL CENTER | Age: 86
End: 2021-02-20
Attending: ANESTHESIOLOGY
Payer: MEDICARE

## 2021-02-20 LAB
COVID ORDER STATUS COVID19: NORMAL
SARS-COV-2 RNA RESP QL NAA+PROBE: NOTDETECTED
SPECIMEN SOURCE: NORMAL

## 2021-02-20 PROCEDURE — C9803 HOPD COVID-19 SPEC COLLECT: HCPCS

## 2021-02-20 PROCEDURE — U0003 INFECTIOUS AGENT DETECTION BY NUCLEIC ACID (DNA OR RNA); SEVERE ACUTE RESPIRATORY SYNDROME CORONAVIRUS 2 (SARS-COV-2) (CORONAVIRUS DISEASE [COVID-19]), AMPLIFIED PROBE TECHNIQUE, MAKING USE OF HIGH THROUGHPUT TECHNOLOGIES AS DESCRIBED BY CMS-2020-01-R: HCPCS

## 2021-02-20 PROCEDURE — U0005 INFEC AGEN DETEC AMPLI PROBE: HCPCS

## 2021-03-24 ENCOUNTER — HOSPITAL ENCOUNTER (OUTPATIENT)
Dept: LAB | Facility: MEDICAL CENTER | Age: 86
End: 2021-03-24
Attending: PHYSICIAN ASSISTANT
Payer: MEDICARE

## 2021-03-24 PROCEDURE — 36415 COLL VENOUS BLD VENIPUNCTURE: CPT

## 2021-03-24 PROCEDURE — 84153 ASSAY OF PSA TOTAL: CPT

## 2021-03-25 LAB — PSA SERPL-MCNC: 0.06 NG/ML (ref 0–4)

## 2021-04-21 ENCOUNTER — APPOINTMENT (OUTPATIENT)
Dept: RADIOLOGY | Facility: MEDICAL CENTER | Age: 86
DRG: 446 | End: 2021-04-21
Attending: EMERGENCY MEDICINE
Payer: MEDICARE

## 2021-04-21 ENCOUNTER — NURSE TRIAGE (OUTPATIENT)
Dept: HEALTH INFORMATION MANAGEMENT | Facility: OTHER | Age: 86
End: 2021-04-21

## 2021-04-21 ENCOUNTER — HOSPITAL ENCOUNTER (INPATIENT)
Facility: MEDICAL CENTER | Age: 86
LOS: 2 days | DRG: 446 | End: 2021-04-23
Attending: EMERGENCY MEDICINE | Admitting: STUDENT IN AN ORGANIZED HEALTH CARE EDUCATION/TRAINING PROGRAM
Payer: MEDICARE

## 2021-04-21 DIAGNOSIS — R62.7 FAILURE TO THRIVE IN ADULT: ICD-10-CM

## 2021-04-21 DIAGNOSIS — R17 PAINLESS JAUNDICE: ICD-10-CM

## 2021-04-21 DIAGNOSIS — Z95.0 CARDIAC PACEMAKER IN SITU: ICD-10-CM

## 2021-04-21 DIAGNOSIS — R29.6 FREQUENT FALLS: ICD-10-CM

## 2021-04-21 PROBLEM — R79.89 ELEVATED LFTS: Status: ACTIVE | Noted: 2021-04-21

## 2021-04-21 PROBLEM — Z66 DNAR (DO NOT ATTEMPT RESUSCITATION): Status: ACTIVE | Noted: 2021-04-21

## 2021-04-21 LAB
ALBUMIN SERPL BCP-MCNC: 4.1 G/DL (ref 3.2–4.9)
ALBUMIN/GLOB SERPL: 1.4 G/DL
ALP SERPL-CCNC: 203 U/L (ref 30–99)
ALT SERPL-CCNC: 227 U/L (ref 2–50)
ANION GAP SERPL CALC-SCNC: 10 MMOL/L (ref 7–16)
APPEARANCE UR: ABNORMAL
AST SERPL-CCNC: 93 U/L (ref 12–45)
BACTERIA #/AREA URNS HPF: NEGATIVE /HPF
BASOPHILS # BLD AUTO: 0.4 % (ref 0–1.8)
BASOPHILS # BLD: 0.02 K/UL (ref 0–0.12)
BILIRUB CONJ SERPL-MCNC: 2.3 MG/DL (ref 0.1–0.5)
BILIRUB SERPL-MCNC: 3.7 MG/DL (ref 0.1–1.5)
BILIRUB UR QL STRIP.AUTO: ABNORMAL
BLOOD CULTURE HOLD CXBCH: NORMAL
BUN SERPL-MCNC: 25 MG/DL (ref 8–22)
CALCIUM SERPL-MCNC: 9.4 MG/DL (ref 8.5–10.5)
CAOX CRY #/AREA URNS HPF: ABNORMAL /HPF
CHLORIDE SERPL-SCNC: 106 MMOL/L (ref 96–112)
CO2 SERPL-SCNC: 27 MMOL/L (ref 20–33)
COLOR UR: ABNORMAL
CREAT SERPL-MCNC: 1.22 MG/DL (ref 0.5–1.4)
EOSINOPHIL # BLD AUTO: 0.2 K/UL (ref 0–0.51)
EOSINOPHIL NFR BLD: 4.1 % (ref 0–6.9)
EPI CELLS #/AREA URNS HPF: ABNORMAL /HPF
ERYTHROCYTE [DISTWIDTH] IN BLOOD BY AUTOMATED COUNT: 50.4 FL (ref 35.9–50)
GLOBULIN SER CALC-MCNC: 3 G/DL (ref 1.9–3.5)
GLUCOSE SERPL-MCNC: 92 MG/DL (ref 65–99)
GLUCOSE UR STRIP.AUTO-MCNC: NEGATIVE MG/DL
GRAN CASTS #/AREA URNS LPF: ABNORMAL /LPF
HAV IGM SERPL QL IA: NORMAL
HBV CORE IGM SER QL: NORMAL
HBV SURFACE AG SER QL: NORMAL
HCT VFR BLD AUTO: 42.5 % (ref 42–52)
HCV AB SER QL: NORMAL
HGB BLD-MCNC: 13.8 G/DL (ref 14–18)
HYALINE CASTS #/AREA URNS LPF: ABNORMAL /LPF
IMM GRANULOCYTES # BLD AUTO: 0.02 K/UL (ref 0–0.11)
IMM GRANULOCYTES NFR BLD AUTO: 0.4 % (ref 0–0.9)
KETONES UR STRIP.AUTO-MCNC: NEGATIVE MG/DL
LEUKOCYTE ESTERASE UR QL STRIP.AUTO: ABNORMAL
LYMPHOCYTES # BLD AUTO: 0.64 K/UL (ref 1–4.8)
LYMPHOCYTES NFR BLD: 13 % (ref 22–41)
MCH RBC QN AUTO: 32.1 PG (ref 27–33)
MCHC RBC AUTO-ENTMCNC: 32.5 G/DL (ref 33.7–35.3)
MCV RBC AUTO: 98.8 FL (ref 81.4–97.8)
MICRO URNS: ABNORMAL
MONOCYTES # BLD AUTO: 0.65 K/UL (ref 0–0.85)
MONOCYTES NFR BLD AUTO: 13.2 % (ref 0–13.4)
NEUTROPHILS # BLD AUTO: 3.4 K/UL (ref 1.82–7.42)
NEUTROPHILS NFR BLD: 68.9 % (ref 44–72)
NITRITE UR QL STRIP.AUTO: POSITIVE
NRBC # BLD AUTO: 0 K/UL
NRBC BLD-RTO: 0 /100 WBC
PH UR STRIP.AUTO: 5 [PH] (ref 5–8)
PLATELET # BLD AUTO: 166 K/UL (ref 164–446)
PMV BLD AUTO: 10.2 FL (ref 9–12.9)
POTASSIUM SERPL-SCNC: 3.8 MMOL/L (ref 3.6–5.5)
PROT SERPL-MCNC: 7.1 G/DL (ref 6–8.2)
PROT UR QL STRIP: 100 MG/DL
RBC # BLD AUTO: 4.3 M/UL (ref 4.7–6.1)
RBC # URNS HPF: ABNORMAL /HPF
RBC UR QL AUTO: NEGATIVE
SARS-COV-2 RNA RESP QL NAA+PROBE: NOTDETECTED
SODIUM SERPL-SCNC: 143 MMOL/L (ref 135–145)
SP GR UR STRIP.AUTO: 1.02
SPECIMEN SOURCE: NORMAL
UROBILINOGEN UR STRIP.AUTO-MCNC: 1 MG/DL
WBC # BLD AUTO: 4.9 K/UL (ref 4.8–10.8)
WBC #/AREA URNS HPF: ABNORMAL /HPF

## 2021-04-21 PROCEDURE — 80074 ACUTE HEPATITIS PANEL: CPT

## 2021-04-21 PROCEDURE — 80053 COMPREHEN METABOLIC PANEL: CPT

## 2021-04-21 PROCEDURE — 99285 EMERGENCY DEPT VISIT HI MDM: CPT

## 2021-04-21 PROCEDURE — 74177 CT ABD & PELVIS W/CONTRAST: CPT | Mod: MG

## 2021-04-21 PROCEDURE — U0003 INFECTIOUS AGENT DETECTION BY NUCLEIC ACID (DNA OR RNA); SEVERE ACUTE RESPIRATORY SYNDROME CORONAVIRUS 2 (SARS-COV-2) (CORONAVIRUS DISEASE [COVID-19]), AMPLIFIED PROBE TECHNIQUE, MAKING USE OF HIGH THROUGHPUT TECHNOLOGIES AS DESCRIBED BY CMS-2020-01-R: HCPCS

## 2021-04-21 PROCEDURE — 82378 CARCINOEMBRYONIC ANTIGEN: CPT

## 2021-04-21 PROCEDURE — 82248 BILIRUBIN DIRECT: CPT

## 2021-04-21 PROCEDURE — 700105 HCHG RX REV CODE 258: Performed by: STUDENT IN AN ORGANIZED HEALTH CARE EDUCATION/TRAINING PROGRAM

## 2021-04-21 PROCEDURE — U0005 INFEC AGEN DETEC AMPLI PROBE: HCPCS

## 2021-04-21 PROCEDURE — 36415 COLL VENOUS BLD VENIPUNCTURE: CPT

## 2021-04-21 PROCEDURE — 700117 HCHG RX CONTRAST REV CODE 255: Performed by: EMERGENCY MEDICINE

## 2021-04-21 PROCEDURE — 81001 URINALYSIS AUTO W/SCOPE: CPT

## 2021-04-21 PROCEDURE — 86301 IMMUNOASSAY TUMOR CA 19-9: CPT

## 2021-04-21 PROCEDURE — 770006 HCHG ROOM/CARE - MED/SURG/GYN SEMI*

## 2021-04-21 PROCEDURE — 76705 ECHO EXAM OF ABDOMEN: CPT

## 2021-04-21 PROCEDURE — 85025 COMPLETE CBC W/AUTO DIFF WBC: CPT

## 2021-04-21 PROCEDURE — 87086 URINE CULTURE/COLONY COUNT: CPT

## 2021-04-21 PROCEDURE — 99222 1ST HOSP IP/OBS MODERATE 55: CPT | Mod: AI | Performed by: STUDENT IN AN ORGANIZED HEALTH CARE EDUCATION/TRAINING PROGRAM

## 2021-04-21 RX ORDER — POLYETHYLENE GLYCOL 3350 17 G/17G
1 POWDER, FOR SOLUTION ORAL
Status: DISCONTINUED | OUTPATIENT
Start: 2021-04-21 | End: 2021-04-23 | Stop reason: HOSPADM

## 2021-04-21 RX ORDER — AMOXICILLIN 250 MG
2 CAPSULE ORAL 2 TIMES DAILY
Status: DISCONTINUED | OUTPATIENT
Start: 2021-04-21 | End: 2021-04-23 | Stop reason: HOSPADM

## 2021-04-21 RX ORDER — LABETALOL HYDROCHLORIDE 5 MG/ML
10 INJECTION, SOLUTION INTRAVENOUS EVERY 4 HOURS PRN
Status: DISCONTINUED | OUTPATIENT
Start: 2021-04-21 | End: 2021-04-23 | Stop reason: HOSPADM

## 2021-04-21 RX ORDER — SODIUM CHLORIDE, SODIUM LACTATE, POTASSIUM CHLORIDE, CALCIUM CHLORIDE 600; 310; 30; 20 MG/100ML; MG/100ML; MG/100ML; MG/100ML
INJECTION, SOLUTION INTRAVENOUS CONTINUOUS
Status: DISCONTINUED | OUTPATIENT
Start: 2021-04-21 | End: 2021-04-23 | Stop reason: HOSPADM

## 2021-04-21 RX ORDER — ONDANSETRON 2 MG/ML
4 INJECTION INTRAMUSCULAR; INTRAVENOUS EVERY 4 HOURS PRN
Status: DISCONTINUED | OUTPATIENT
Start: 2021-04-21 | End: 2021-04-23 | Stop reason: HOSPADM

## 2021-04-21 RX ORDER — ONDANSETRON 4 MG/1
4 TABLET, ORALLY DISINTEGRATING ORAL EVERY 4 HOURS PRN
Status: DISCONTINUED | OUTPATIENT
Start: 2021-04-21 | End: 2021-04-23 | Stop reason: HOSPADM

## 2021-04-21 RX ORDER — BISACODYL 10 MG
10 SUPPOSITORY, RECTAL RECTAL
Status: DISCONTINUED | OUTPATIENT
Start: 2021-04-21 | End: 2021-04-23 | Stop reason: HOSPADM

## 2021-04-21 RX ADMIN — IOHEXOL 90 ML: 350 INJECTION, SOLUTION INTRAVENOUS at 15:04

## 2021-04-21 RX ADMIN — SODIUM CHLORIDE, POTASSIUM CHLORIDE, SODIUM LACTATE AND CALCIUM CHLORIDE: 600; 310; 30; 20 INJECTION, SOLUTION INTRAVENOUS at 20:48

## 2021-04-21 ASSESSMENT — COGNITIVE AND FUNCTIONAL STATUS - GENERAL
SUGGESTED CMS G CODE MODIFIER DAILY ACTIVITY: CH
STANDING UP FROM CHAIR USING ARMS: A LITTLE
MOBILITY SCORE: 18
WALKING IN HOSPITAL ROOM: A LITTLE
TURNING FROM BACK TO SIDE WHILE IN FLAT BAD: A LITTLE
DAILY ACTIVITIY SCORE: 24
CLIMB 3 TO 5 STEPS WITH RAILING: A LITTLE
MOVING TO AND FROM BED TO CHAIR: A LITTLE
MOVING FROM LYING ON BACK TO SITTING ON SIDE OF FLAT BED: A LITTLE
SUGGESTED CMS G CODE MODIFIER MOBILITY: CK

## 2021-04-21 ASSESSMENT — ENCOUNTER SYMPTOMS
RESPIRATORY NEGATIVE: 1
EYES NEGATIVE: 1
PSYCHIATRIC NEGATIVE: 1
GASTROINTESTINAL NEGATIVE: 1
CARDIOVASCULAR NEGATIVE: 1
MUSCULOSKELETAL NEGATIVE: 1
CONSTITUTIONAL NEGATIVE: 1
NEUROLOGICAL NEGATIVE: 1

## 2021-04-21 ASSESSMENT — FIBROSIS 4 INDEX: FIB4 SCORE: 2.07

## 2021-04-21 ASSESSMENT — PATIENT HEALTH QUESTIONNAIRE - PHQ9
2. FEELING DOWN, DEPRESSED, IRRITABLE, OR HOPELESS: NOT AT ALL
SUM OF ALL RESPONSES TO PHQ9 QUESTIONS 1 AND 2: 0
1. LITTLE INTEREST OR PLEASURE IN DOING THINGS: NOT AT ALL

## 2021-04-21 NOTE — TELEPHONE ENCOUNTER
"Per Daughter in-law, seen pt last night and skin appeared yellow.    Urinated blood x 3 Days    Poor appetite, and only drinks Coke, not water.    No appt available to see PCP, advised pt to go to the ED to be evaluated.       Reason for Disposition  • Patient sounds very sick or weak to the triager    Additional Information  • Negative: Unconscious or difficult to awaken  • Negative: Acting confused (e.g., disoriented, slurred speech)  • Negative: Seizure has occurred  • Negative: Has fainted (passed out)  • Negative: Very weak (e.g., can't stand)  • Negative: Acetaminophen overdose or poisoning suspected  • Negative: Sounds like a life-threatening emergency to the triager  • Negative: [1] Abdominal pain AND [2] severe  • Negative: [1] Constant abdominal pain AND [2] present > 2 hours  • Negative: [1] Vomiting AND [2] contains red blood or black (\"coffee ground\") material  • Negative: [1] Vomiting AND [2] signs of dehydration (e.g., very dry mouth, lightheaded)  • Negative: Fever  • Negative: Shaking chills  • Negative: [1] Drinking very little AND [2] dehydration suspected (e.g., no urine > 12 hours, very dry mouth, very lightheaded)    Answer Assessment - Initial Assessment Questions  1. EYES: \"Are the whites of the eyes yellow?\"      No  2. ONSET: \"When did the jaundice begin?      unsure  3. FEVER: \"Do you have a fever?\" If so, ask: \"What is it, how was it measured, and when did it start?\"      no  4. VOMITING: \"Have you been vomiting?\"       x1 Saturday  5. DEHYDRATION: \"Have you been able to keep any liquids down?\" \"When was the last time you urinated?\" \"Do you feel dizzy?\"      Urinating fine, just blood in urine  6. ABDOMINAL PAIN: \"Are you having any abdominal pain?\" If yes, ask: \"How bad is it?\"  (e.g., mild, moderate, severe)   - MILD - doesn't interfere with normal activities    - MODERATE - interferes with normal activities or awakens from sleep    - SEVERE - patient doesn't want to move (R/O " "peritonitis)       None  7. CONTACT: \"Have you been around anyone who was jaundiced?\"      No  8. CAUSE: \"What do you think is causing the jaundice?\"      Thinks Orange juice  9. OTHER SYMPTOMS: \"Do you have any other symptoms?\" (e.g., shaking chills, itching, blood in stool)      Hx Diarrhea  10. PREGNANCY: \"Is there any chance you are pregnant?\" \"When was your last menstrual period?\"        n/a    Protocols used: [id number]      "

## 2021-04-21 NOTE — ED TRIAGE NOTES
Pt comes in reporting an orange tinge to his urine a few days ago and now blood in his urine. Denies trouble/pain with urination.

## 2021-04-21 NOTE — ED NOTES
Pharmacy Medication Reconciliation    Medication Reconciliation updated and complete per pt at bedside  Allergies reviewed          Patient reports NO Prescription or OTC medications

## 2021-04-21 NOTE — ED PROVIDER NOTES
ED Provider Note    CHIEF COMPLAINT  Chief Complaint   Patient presents with   • Blood in Urine       HPI  Chico Cedillo is a 88 y.o. male who presents with chief complaint of what he believes is blood in his urine.  Patient reports that he noticed his urine was relatively orange yesterday and today and was much darker, he is pretty sure there is blood in it.  He denies any associated abdominal pain, chest pain, flank pain or history of kidney stones.  He does have a history of prostate cancer but is currently cancer free.  He also has a history of pacemaker for arrhythmia.  Patient denies any use of anticoagulants.  Patient denies any bleeding diathesis otherwise.  Patient son reports that he has noticed that his to have looks more yellow than normal and that his eyes look a little yellow as well.  Patient denies any associated epigastric pain, or fevers chills or constitutional symptoms.    REVIEW OF SYSTEMS  ROS    See HPI for further details. All other systems are negative.     PAST MEDICAL HISTORY   has a past medical history of Cancer (HCC) (2011), Cataract (01/27/2021), Diarrhea, Pacemaker, and Urinary incontinence (01/27/2021).    SOCIAL HISTORY  Social History     Tobacco Use   • Smoking status: Never Smoker   • Smokeless tobacco: Never Used   Substance and Sexual Activity   • Alcohol use: No     Alcohol/week: 0.0 oz   • Drug use: No   • Sexual activity: Not Currently       SURGICAL HISTORY   has a past surgical history that includes pacemaker insertion; other; tonsillectomy; and cataract phaco with iol (Right, 2/10/2021).    CURRENT MEDICATIONS  Home Medications     Reviewed by Maria Esther Hernández R.N. (Registered Nurse) on 04/21/21 at 1016  Med List Status: Complete   Medication Last Dose Status        Patient Moe Taking any Medications                       ALLERGIES  Allergies   Allergen Reactions   • Nkda [No Known Drug Allergy]        PHYSICAL EXAM  Vitals:    04/21/21 1301   BP: 136/63   Pulse:    Resp:     Temp:    SpO2:        Physical Exam   Constitutional: He is oriented to person, place, and time. He appears well-developed and well-nourished.   HENT:   Head: Normocephalic and atraumatic.   Eyes: Pupils are equal, round, and reactive to light. Conjunctivae are normal.   Mild scleral icterus   Cardiovascular: Normal rate and regular rhythm. Exam reveals no gallop and no friction rub.   No murmur heard.  Pulmonary/Chest: Effort normal and breath sounds normal. No respiratory distress. He has no wheezes.   Abdominal: Soft. Bowel sounds are normal. He exhibits no distension. There is no abdominal tenderness. There is no rebound.   Musculoskeletal:      Cervical back: Normal range of motion and neck supple.   Neurological: He is alert and oriented to person, place, and time.   Skin: Skin is warm and dry.   Mild jaundice   Psychiatric: He has a normal mood and affect. His behavior is normal.         DIAGNOSTIC STUDIES / PROCEDURES      LABS  Results for orders placed or performed during the hospital encounter of 04/21/21   URINALYSIS,CULTURE IF INDICATED    Specimen: Urine   Result Value Ref Range    Color DK Yellow     Character Cloudy (A)     Specific Gravity 1.025 <1.035    Ph 5.0 5.0 - 8.0    Glucose Negative Negative mg/dL    Ketones Negative Negative mg/dL    Protein 100 (A) Negative mg/dL    Bilirubin Large (A) Negative    Urobilinogen, Urine 1.0 Negative    Nitrite Positive (A) Negative    Leukocyte Esterase Small (A) Negative    Occult Blood Negative Negative    Micro Urine Req Microscopic    CBC WITH DIFFERENTIAL   Result Value Ref Range    WBC 4.9 4.8 - 10.8 K/uL    RBC 4.30 (L) 4.70 - 6.10 M/uL    Hemoglobin 13.8 (L) 14.0 - 18.0 g/dL    Hematocrit 42.5 42.0 - 52.0 %    MCV 98.8 (H) 81.4 - 97.8 fL    MCH 32.1 27.0 - 33.0 pg    MCHC 32.5 (L) 33.7 - 35.3 g/dL    RDW 50.4 (H) 35.9 - 50.0 fL    Platelet Count 166 164 - 446 K/uL    MPV 10.2 9.0 - 12.9 fL    Neutrophils-Polys 68.90 44.00 - 72.00 %    Lymphocytes  13.00 (L) 22.00 - 41.00 %    Monocytes 13.20 0.00 - 13.40 %    Eosinophils 4.10 0.00 - 6.90 %    Basophils 0.40 0.00 - 1.80 %    Immature Granulocytes 0.40 0.00 - 0.90 %    Nucleated RBC 0.00 /100 WBC    Neutrophils (Absolute) 3.40 1.82 - 7.42 K/uL    Lymphs (Absolute) 0.64 (L) 1.00 - 4.80 K/uL    Monos (Absolute) 0.65 0.00 - 0.85 K/uL    Eos (Absolute) 0.20 0.00 - 0.51 K/uL    Baso (Absolute) 0.02 0.00 - 0.12 K/uL    Immature Granulocytes (abs) 0.02 0.00 - 0.11 K/uL    NRBC (Absolute) 0.00 K/uL   CMP   Result Value Ref Range    Sodium 143 135 - 145 mmol/L    Potassium 3.8 3.6 - 5.5 mmol/L    Chloride 106 96 - 112 mmol/L    Co2 27 20 - 33 mmol/L    Anion Gap 10.0 7.0 - 16.0    Glucose 92 65 - 99 mg/dL    Bun 25 (H) 8 - 22 mg/dL    Creatinine 1.22 0.50 - 1.40 mg/dL    Calcium 9.4 8.5 - 10.5 mg/dL    AST(SGOT) 93 (H) 12 - 45 U/L    ALT(SGPT) 227 (H) 2 - 50 U/L    Alkaline Phosphatase 203 (H) 30 - 99 U/L    Total Bilirubin 3.7 (H) 0.1 - 1.5 mg/dL    Albumin 4.1 3.2 - 4.9 g/dL    Total Protein 7.1 6.0 - 8.2 g/dL    Globulin 3.0 1.9 - 3.5 g/dL    A-G Ratio 1.4 g/dL   URINE MICROSCOPIC (W/UA)   Result Value Ref Range    WBC 10-20 (A) /hpf    RBC 0-2 (A) /hpf    Bacteria Negative None /hpf    Epithelial Cells Few /hpf    Ca Oxalate Crystal Few /hpf    Hyaline Cast 3-5 (A) /lpf    Granular Casts 3-5 (A) /lpf   URINE CULTURE(NEW)    Specimen: Urine   Result Value Ref Range    Significant Indicator NEG     Source UR     Site -     Culture Result -    ESTIMATED GFR   Result Value Ref Range    GFR If African American >60 >60 mL/min/1.73 m 2    GFR If Non  56 (A) >60 mL/min/1.73 m 2   Blood Culture,Hold   Result Value Ref Range    Blood Culture Hold Collected    BILIRUBIN DIRECT   Result Value Ref Range    Direct Bilirubin 2.3 (H) 0.1 - 0.5 mg/dL         RADIOLOGY  US-RUQ   Final Result      1.  Stones and sludge in the gallbladder.   2.  No other evidence to indicate acute cholecystitis or biliary obstruction.    3.  Limited evaluation of the pancreas.         CT-ABDOMEN-PELVIS WITH   Final Result      1.  No acute inflammatory or obstructive process.      2.  Stable postoperative changes involving the prostate gland.      3.  Minimal interstitial opacity in the posterior aspect of each lower lobe which may represent      4.  Small bilateral fat-containing inguinal hernias are unchanged.      [id number]    (Results Pending)           COURSE & MEDICAL DECISION MAKING  Pertinent Labs & Imaging studies reviewed. (See chart for details)  Though hematuria is possible, we will confirm this with urinalysis, I am more concerned about possible painless jaundice causing dark urine which patient is mistaking as hematuria.  Patient urinalysis does not fact show hyperbilirubinemia rather than hematuria.  His CMP redemonstrates this.  I have sent a direct bilirubin to further evaluate for possible hemolysis versus cholestatic process though I am leaning towards cholestatic process given patient's associated LFT perturbations.  Obviously given the painless jaundice and patient's age I am very concerned about possible pancreatic cancer as a cause of etiology.  Patient's CT fails to reveal any obvious mass  Patient will have ultrasound for further delineation patient's course. Patient's direct bilirubin is elevated suggesting a cholestatic process. Hepatitis panel sent.  Patient ultrasound fails to reveal any large CBD dilatation, he does have some stones and sludge. Will check MR. I discussed the case with GI who agrees with management.  Will discuss the case with hospitalist for admission and further work-up.      FINAL IMPRESSION  1. Painless jaundice, hyperbilirubinemia, transaminitis      Electronically signed by: Manuel Oglesby M.D., 4/21/2021 1:23 PM

## 2021-04-21 NOTE — TELEPHONE ENCOUNTER
Regarding: Next course of action   ----- Message from Noelle Garcia sent at 4/21/2021  8:31 AM PDT -----  Vomiting over the weekend, blood in urine for 4 days, jaundice

## 2021-04-22 PROBLEM — R79.89 ELEVATED LFTS: Status: RESOLVED | Noted: 2021-04-21 | Resolved: 2021-04-22

## 2021-04-22 LAB
ALBUMIN SERPL BCP-MCNC: 3.4 G/DL (ref 3.2–4.9)
ALBUMIN/GLOB SERPL: 1.4 G/DL
ALP SERPL-CCNC: 158 U/L (ref 30–99)
ALT SERPL-CCNC: 150 U/L (ref 2–50)
ANION GAP SERPL CALC-SCNC: 8 MMOL/L (ref 7–16)
AST SERPL-CCNC: 46 U/L (ref 12–45)
BASOPHILS # BLD AUTO: 1 % (ref 0–1.8)
BASOPHILS # BLD: 0.04 K/UL (ref 0–0.12)
BILIRUB SERPL-MCNC: 2.7 MG/DL (ref 0.1–1.5)
BUN SERPL-MCNC: 23 MG/DL (ref 8–22)
CALCIUM SERPL-MCNC: 8.9 MG/DL (ref 8.5–10.5)
CEA SERPL-MCNC: 1.9 NG/ML (ref 0–3)
CHLORIDE SERPL-SCNC: 108 MMOL/L (ref 96–112)
CO2 SERPL-SCNC: 27 MMOL/L (ref 20–33)
CREAT SERPL-MCNC: 0.95 MG/DL (ref 0.5–1.4)
EOSINOPHIL # BLD AUTO: 0.24 K/UL (ref 0–0.51)
EOSINOPHIL NFR BLD: 6.1 % (ref 0–6.9)
ERYTHROCYTE [DISTWIDTH] IN BLOOD BY AUTOMATED COUNT: 49.1 FL (ref 35.9–50)
GLOBULIN SER CALC-MCNC: 2.4 G/DL (ref 1.9–3.5)
GLUCOSE SERPL-MCNC: 81 MG/DL (ref 65–99)
HCT VFR BLD AUTO: 35.7 % (ref 42–52)
HGB BLD-MCNC: 12 G/DL (ref 14–18)
IMM GRANULOCYTES # BLD AUTO: 0 K/UL (ref 0–0.11)
IMM GRANULOCYTES NFR BLD AUTO: 0 % (ref 0–0.9)
LYMPHOCYTES # BLD AUTO: 0.65 K/UL (ref 1–4.8)
LYMPHOCYTES NFR BLD: 16.4 % (ref 22–41)
MCH RBC QN AUTO: 32.8 PG (ref 27–33)
MCHC RBC AUTO-ENTMCNC: 33.6 G/DL (ref 33.7–35.3)
MCV RBC AUTO: 97.5 FL (ref 81.4–97.8)
MONOCYTES # BLD AUTO: 0.56 K/UL (ref 0–0.85)
MONOCYTES NFR BLD AUTO: 14.1 % (ref 0–13.4)
NEUTROPHILS # BLD AUTO: 2.47 K/UL (ref 1.82–7.42)
NEUTROPHILS NFR BLD: 62.4 % (ref 44–72)
NRBC # BLD AUTO: 0 K/UL
NRBC BLD-RTO: 0 /100 WBC
PLATELET # BLD AUTO: 139 K/UL (ref 164–446)
PMV BLD AUTO: 9.9 FL (ref 9–12.9)
POTASSIUM SERPL-SCNC: 3.8 MMOL/L (ref 3.6–5.5)
PROT SERPL-MCNC: 5.8 G/DL (ref 6–8.2)
RBC # BLD AUTO: 3.66 M/UL (ref 4.7–6.1)
SODIUM SERPL-SCNC: 143 MMOL/L (ref 135–145)
WBC # BLD AUTO: 4 K/UL (ref 4.8–10.8)

## 2021-04-22 PROCEDURE — 83516 IMMUNOASSAY NONANTIBODY: CPT

## 2021-04-22 PROCEDURE — 99232 SBSQ HOSP IP/OBS MODERATE 35: CPT | Performed by: INTERNAL MEDICINE

## 2021-04-22 PROCEDURE — 97165 OT EVAL LOW COMPLEX 30 MIN: CPT

## 2021-04-22 PROCEDURE — 770006 HCHG ROOM/CARE - MED/SURG/GYN SEMI*

## 2021-04-22 PROCEDURE — 85025 COMPLETE CBC W/AUTO DIFF WBC: CPT

## 2021-04-22 PROCEDURE — 86038 ANTINUCLEAR ANTIBODIES: CPT

## 2021-04-22 PROCEDURE — 97161 PT EVAL LOW COMPLEX 20 MIN: CPT

## 2021-04-22 PROCEDURE — 97535 SELF CARE MNGMENT TRAINING: CPT

## 2021-04-22 PROCEDURE — 36415 COLL VENOUS BLD VENIPUNCTURE: CPT

## 2021-04-22 PROCEDURE — 80053 COMPREHEN METABOLIC PANEL: CPT

## 2021-04-22 ASSESSMENT — ENCOUNTER SYMPTOMS
BLOOD IN STOOL: 0
BRUISES/BLEEDS EASILY: 0
HEARTBURN: 0
POLYDIPSIA: 0
DIZZINESS: 0
EYE PAIN: 0
COUGH: 0
DIARRHEA: 0
SORE THROAT: 0
NAUSEA: 0
FLANK PAIN: 0
PALPITATIONS: 0
EYE DISCHARGE: 0
STRIDOR: 0
SEIZURES: 0
NERVOUS/ANXIOUS: 0
VOMITING: 0
WEIGHT LOSS: 1
SHORTNESS OF BREATH: 0
FALLS: 0
LOSS OF CONSCIOUSNESS: 0
ABDOMINAL PAIN: 0
FEVER: 0
CHILLS: 0
NECK PAIN: 0

## 2021-04-22 ASSESSMENT — COGNITIVE AND FUNCTIONAL STATUS - GENERAL
SUGGESTED CMS G CODE MODIFIER MOBILITY: CI
HELP NEEDED FOR BATHING: A LITTLE
SUGGESTED CMS G CODE MODIFIER DAILY ACTIVITY: CI
CLIMB 3 TO 5 STEPS WITH RAILING: A LITTLE
DAILY ACTIVITIY SCORE: 23
MOBILITY SCORE: 23

## 2021-04-22 ASSESSMENT — GAIT ASSESSMENTS
DISTANCE (FEET): 120
GAIT LEVEL OF ASSIST: MINIMAL ASSIST

## 2021-04-22 ASSESSMENT — PAIN DESCRIPTION - PAIN TYPE: TYPE: ACUTE PAIN

## 2021-04-22 ASSESSMENT — LIFESTYLE VARIABLES: SUBSTANCE_ABUSE: 0

## 2021-04-22 ASSESSMENT — ACTIVITIES OF DAILY LIVING (ADL): TOILETING: INDEPENDENT

## 2021-04-22 NOTE — CARE PLAN
Problem: Safety  Goal: Will remain free from injury  Outcome: PROGRESSING AS EXPECTED   No unsafe attempts at mobility, gait can be unsteady-SBA needed  Problem: Knowledge Deficit  Goal: Knowledge of disease process/condition, treatment plan, diagnostic tests, and medications will improve  Outcome: PROGRESSING AS EXPECTED   Discussed POC with pt and MD, answered questions as needed

## 2021-04-22 NOTE — ASSESSMENT & PLAN NOTE
Noted for jaundice at ED, painless, some mild weight loss over the past 2 years, no other new symptoms  _CEA normal  -CA 19-9 pending  -Cant get MRI 2/2 PPM  -CT scan and RUQ US with gallstones, but normal CBD and no clear pancreatic masses  -Liver labs have improved, remains asymptomatic  -?monitor overnight and then outpatient follow up, defer ERCP for now  GI following (Dr. Vargas), appreciate recommendations

## 2021-04-22 NOTE — ED NOTES
Pt has [employer] EDOUARD [address] IS-1, #L101 Serial #[id number].  Per [employer], the model is NOT MRI compatible.      Dr. Clements cardiologist.

## 2021-04-22 NOTE — ED NOTES
Report from MANDO Fenton. Assumed care of pt, covid swab collected and sent, pt sitting up in gurney, respirations even and unlabored. Daughter at bedside. Pt updated on POC. NAD noted.

## 2021-04-22 NOTE — PROGRESS NOTES
Bedside report received.  Assessment complete.  A&O x 4. Patient calls appropriately.  Patient mobilizes with SBA. Bed alarm on.   Patient has 0/10 pain. Patient denies any intervention at this time.  Denies N&V. Currently NPO.  + void, + flatus, + BM.  Patient denies SOB.  SCD's off.  Review plan with of care with patient. Call light and personal belongings with in reach. Hourly rounding in place. All needs met at this time.

## 2021-04-22 NOTE — DISCHARGE SUMMARY
Discharge Summary    CHIEF COMPLAINT ON ADMISSION  Chief Complaint   Patient presents with   • Blood in Urine       Reason for Admission  Urinary Retention      Admission Date  4/21/2021    CODE STATUS  DNAR, I OK    HPI & HOSPITAL COURSE  This is a 88 y.o. male here with no significant past medical issues except for prostate cancer came to the hospital with darker urine and elevated LFT's and total bilirubin. He was admitted to the general medical floor and GI was consulted. Patient had a normal CT abdomen/pelvis and RUQ US showed evidence of gallstones but no clear cholecystitis. Common bile duct diameter appeared without dilation. MRCP is not possible given patient's incompatible PPM. His labs slowly self improved and etiology is not entirely clear at this time. ERCP was deferred given self correction of labs. Multiple auto-immune labs have been ordered and will need outpatient follow up. Patient will also need home health services which has been ordered for patient. CEA is normal. CA 19-9 is pending at this time. There was no clear pancreatic masses not on imaging.   No notes on file    Therefore, he is discharged in fair and stable condition to home with close outpatient follow-up.    The patient met 2-midnight criteria for an inpatient stay at the time of discharge.    Discharge Date  4/23/2021    FOLLOW UP ITEMS POST DISCHARGE  F/U with PCP in 1-2 weeks  F/U with DR Vargas of GI in 1-2 weeks    DISCHARGE DIAGNOSES  Principal Problem:    Painless jaundice POA: Yes  Active Problems:    DNAR (do not attempt resuscitation) POA: Yes  Resolved Problems:    * No resolved hospital problems. *      FOLLOW UP  No future appointments.  No follow-up provider specified.    MEDICATIONS ON DISCHARGE     Medication List      You have not been prescribed any medications.         Allergies  No Known Allergies    DIET  Orders Placed This Encounter   Procedures   • Diet Order Diet: Regular; Nutrient modifications: (optional):  Low Fat     Standing Status:   Standing     Number of Occurrences:   1     Order Specific Question:   Diet:     Answer:   Regular [1]     Order Specific Question:   Nutrient modifications: (optional)     Answer:   Low Fat [5]       ACTIVITY  As tolerated.  Weight bearing as tolerated    CONSULTATIONS  GI    PROCEDURES  US-RUQ   Final Result      1.  Stones and sludge in the gallbladder.   2.  No other evidence to indicate acute cholecystitis or biliary obstruction.   3.  Limited evaluation of the pancreas.         CT-ABDOMEN-PELVIS WITH   Final Result      1.  No acute inflammatory or obstructive process.      2.  Stable postoperative changes involving the prostate gland.      3.  Minimal interstitial opacity in the posterior aspect of each lower lobe which may represent      4.  Small bilateral fat-containing inguinal hernias are unchanged.      [id number]/O    (Results Pending)         LABORATORY  Lab Results   Component Value Date    SODIUM 143 04/22/2021    POTASSIUM 3.8 04/22/2021    CHLORIDE 108 04/22/2021    CO2 27 04/22/2021    GLUCOSE 81 04/22/2021    BUN 23 (H) 04/22/2021    CREATININE 0.95 04/22/2021    CREATININE 1.1 09/17/2008        Lab Results   Component Value Date    WBC 4.0 (L) 04/22/2021    HEMOGLOBIN 12.0 (L) 04/22/2021    HEMATOCRIT 35.7 (L) 04/22/2021    PLATELETCT 139 (L) 04/22/2021        Total time of the discharge process exceeds 34 minutes.

## 2021-04-22 NOTE — FACE TO FACE
Face to Face Supporting Documentation - Home Health    The encounter with this patient was in whole or in part the primary reason for home health admission.    Date of encounter:   Patient:                    MRN:                       YOB: 2021  Chico Cedillo  [id number]  8/27/1932     Home health to see patient for:  Skilled Nursing care for assessment, interventions & education, Home health aide, Physical Therapy evaluation and treatment and Occupational therapy evaluation and treatment    Skilled need for:  Exacerbation of Chronic Disease State pacemaker in situ    Skilled nursing interventions to include:  Comment: needs help with therapies    Homebound status evidenced by:  Need the aid of supportive devices such as crutches, canes, wheelchairs or walkers. Leaving home requires a considerable and taxing effort. There is a normal inability to leave the home.    Community Physician to provide follow up care: Leandra Hayes P.A.-C.     Optional Interventions? No      I certify the face to face encounter for this home health care referral meets the CMS requirements and the encounter/clinical assessment with the patient was, in whole, or in part, for the medical condition(s) listed above, which is the primary reason for home health care. Based on my clinical findings: the service(s) are medically necessary, support the need for home health care, and the homebound criteria are met.  I certify that this patient has had a face to face encounter by myself.  Sonny Mendoza M.D. - [facility]: [phone]

## 2021-04-22 NOTE — PROGRESS NOTES
"  Gastroenterology ([facility]) Progress Note     Author: Osmel Chinchilla M.D.   Date & Time Created: 4/22/2021 4:23 PM    Chief Complaint:  jaundice    Interval History:  Feeling well, denied pruritus, abdominal pain, fevers, chills, nausea or vomiting.  Granddaughter wants him to go home.  Denied further modifying factors, associated symptoms or timing issues.       REASON FOR REQUEST:  Jaundice.     HISTORY OF PRESENT ILLNESS per Dr. Vargas's consult:  :\"The patient is a very pleasant 88-year-old male brought in by family members when it was noticed that he was slightly yellow.  He does complain of what he called dark or orange urine for several days and on Saturday night, now 4 days ago, he had a sudden episode of nausea and   vomiting, but denies any abdominal pain.  He denies any chest pain, no shortness of breath.  He thinks that his urine is now clear, although he cannot completely remember.  He denies any debra-colored stools.  No new medications.  He had a pacemaker placed 15 years ago and then a replacement 2-1/2 to 3 years ago here that appears to be a [employer] model that appears to be not MRI compatible.  Currently, he states that he feels quite well.  He denies any weight loss, no vomiting.  No rashes.  No new urinary complaints.\"       Review of Systems:  Review of Systems   Constitutional: Negative for chills and fever.   HENT: Negative for sore throat.    Eyes: Negative for pain and discharge.   Respiratory: Negative for cough, shortness of breath and stridor.    Cardiovascular: Negative for chest pain, palpitations and leg swelling.   Gastrointestinal: Negative for abdominal pain, blood in stool, diarrhea, heartburn, melena, nausea and vomiting.   Genitourinary: Negative for flank pain and hematuria.   Musculoskeletal: Negative for falls and neck pain.   Skin: Negative for itching and rash.   Neurological: Negative for dizziness, seizures and loss of consciousness.   Endo/Heme/Allergies: Negative " for polydipsia. Does not bruise/bleed easily.   Psychiatric/Behavioral: Negative for substance abuse. The patient is not nervous/anxious.    All other systems reviewed and are negative.      Physical Exam:  Physical Exam  Vitals and nursing note reviewed. Exam conducted with a chaperone present.   Constitutional:       General: He is not in acute distress.     Appearance: Normal appearance. He is normal weight. He is not ill-appearing, toxic-appearing or diaphoretic.   HENT:      Head: Normocephalic and atraumatic.      Nose: Nose normal. No congestion or rhinorrhea.      Mouth/Throat:      Mouth: Mucous membranes are moist.      Pharynx: Oropharynx is clear.   Eyes:      General: No scleral icterus.     Extraocular Movements: Extraocular movements intact.      Conjunctiva/sclera: Conjunctivae normal.      Pupils: Pupils are equal, round, and reactive to light.   Cardiovascular:      Rate and Rhythm: Normal rate and regular rhythm.      Pulses: Normal pulses.      Heart sounds: Normal heart sounds.   Pulmonary:      Effort: Pulmonary effort is normal. No respiratory distress.      Breath sounds: Normal breath sounds. No stridor. No rhonchi.   Abdominal:      General: Abdomen is flat. Bowel sounds are normal. There is no distension.      Palpations: Abdomen is soft.      Tenderness: There is no abdominal tenderness. There is no guarding or rebound.   Musculoskeletal:         General: Normal range of motion.      Right lower leg: No edema.      Left lower leg: No edema.   Skin:     General: Skin is warm.      Coloration: Skin is jaundiced.      Findings: No erythema.   Neurological:      General: No focal deficit present.      Mental Status: He is alert. Mental status is at baseline.      Cranial Nerves: No cranial nerve deficit.      Coordination: Coordination normal.   Psychiatric:         Mood and Affect: Mood normal.         Behavior: Behavior normal.         Thought Content: Thought content normal.          Judgment: Judgment normal.         Labs:          Recent Labs     21  1250 21  0302   SODIUM 143 143   POTASSIUM 3.8 3.8   CHLORIDE 106 108   CO2 27 27   BUN 25* 23*   CREATININE 1.22 0.95   CALCIUM 9.4 8.9     Recent Labs     21  1250 21  0302   ALTSGPT 227* 150*   ASTSGOT 93* 46*   ALKPHOSPHAT 203* 158*   TBILIRUBIN 3.7* 2.7*   DBILIRUBIN 2.3*  --    GLUCOSE 92 81     Recent Labs     21  1250 21  0302   RBC 4.30* 3.66*   HEMOGLOBIN 13.8* 12.0*   HEMATOCRIT 42.5 35.7*   PLATELETCT 166 139*     Recent Labs     21  1250 21  0302   WBC 4.9 4.0*   NEUTSPOLYS 68.90 62.40   LYMPHOCYTES 13.00* 16.40*   MONOCYTES 13.20 14.10*   EOSINOPHILS 4.10 6.10   BASOPHILS 0.40 1.00   ASTSGOT 93* 46*   ALTSGPT 227* 150*   ALKPHOSPHAT 203* 158*   TBILIRUBIN 3.7* 2.7*     Hemodynamics:  Temp (24hrs), Av.4 °C (97.5 °F), Min:36 °C (96.8 °F), Max:36.8 °C (98.3 °F)  Temperature: 36.4 °C (97.6 °F)  Pulse  Av.4  Min: 61  Max: 76   Blood Pressure : 142/70     Respiratory:    Respiration: 17, Pulse Oximetry: 95 %        RUL Breath Sounds: (P) Clear, RML Breath Sounds: (P) Clear, RLL Breath Sounds: (P) Diminished, JOHNNA Breath Sounds: (P) Clear, LLL Breath Sounds: (P) Diminished  Fluids:    Intake/Output Summary (Last 24 hours) at 2021 1623  Last data filed at 2021 1200  Gross per 24 hour   Intake 1361.6 ml   Output 150 ml   Net 1211.6 ml        GI/Nutrition:  Orders Placed This Encounter   Procedures   • Diet Order Diet: Regular; Nutrient modifications: (optional): Low Fat     Standing Status:   Standing     Number of Occurrences:   1     Order Specific Question:   Diet:     Answer:   Regular [1]     Order Specific Question:   Nutrient modifications: (optional)     Answer:   Low Fat [5]     Medical Decision Making, by Problem:  Active Hospital Problems    Diagnosis    • *Painless jaundice [R17]    • DNAR (do not attempt resuscitation) [Z66]      Problems:  1. Jaundice but no  biliary ductal dilatation on CT and ultrasound imaging  2. LFTs down-trending; hep A/B/C negative; no evidence of encephalopathy, coagulopathy nor renal failure  3. Normocytic anemia without overt GI bleeding  4. History of prostate cancer, in remission.  5.  Cataract surgery.  6.  Arrhythmia treat by pacemaker placement  7.  Bilateral asymptomatic inguinal hernias  8.  History of tonsillectomy, and cataract removals bilaterally.    Recommendations:   1. Check MIKAELA and anti-actin antibody  2. Unable to do MRCP due to pacemaker  3. I messaged Dr. Sonny Mendoza via [employer] to let him know recommendations.  4. Okay to discharge home from GI standpoint.  5. [facility] signed off but please feel free to contact us with problems, questions and/or concerns.  6. I contacted my office to arrange clinic follow-up to repeat LFTs and follow-up pending lab results.  7. Consider outpatient liver biopsy vs. EUS-FNA if LFTs remain elevated on repeat.    Quality-Core Measures

## 2021-04-22 NOTE — THERAPY
Occupational Therapy   Initial Evaluation     Patient Name: Chico Cedillo  Age:  88 y.o., Sex:  male  Medical Record #: [id number]  Today's Date: 4/22/2021     Precautions  Precautions: Fall Risk    Assessment  Patient is 88 y.o. male with a diagnosis of jaundice, h/o pacemaker, prostate cancer and chronic lower back pain. Pt was able to perform basic self care tasks, functional mobility and t/f's with supervision/sba, intermittently few lob noted which pt corrected self without cues and assist. Pt's granddtr reported family checks in on pt daily and assist with IADLs, but pt lives by self and has had few falls. Pt doesn't demonstrate the need for acute OT services but will benefit from  OT services to assess for home safety during IADL management.       Plan    Recommend Occupational Therapy for Evaluation only   DC Equipment Recommendations: Tub / Shower Seat  Discharge Recommendations: Recommend home health for continued occupational therapy services        Objective       04/22/21 1116   Prior Living Situation   Prior Services Other (Comments)  (family checks in on pt daily)   Housing / Facility Mobile Home   Steps Into Home 2   Bathroom Set up Walk In Shower;Grab Bars  (step in shower)   Equipment Owned Grab Bar(s) In Tub / Shower   Lives with - Patient's Self Care Capacity Alone and Able to Care For Self   Comments I with ADLs and most of IADLs. Per granddtr, pt lives by self, adult children checks in on pt daily.    Prior Level of ADL Function   Self Feeding Independent   Grooming / Hygiene Independent   Bathing Independent   Dressing Independent   Toileting Independent   Prior Level of IADL Function   Medication Management Other (Comments)  (doesn't take any medication at home)   Laundry Independent   Kitchen Mobility Independent   Finances Independent   Home Management Independent   Shopping Requires Assist   Prior Level Of Mobility Independent Without Device in Community;Independent Without Device in Home  "  Driving / Transportation Relatives / Others Provide Transportation   Occupation (Pre-Hospital Vocational) Retired Due To Age   Leisure Interests Other (Comments)  (walking around the neighborhood)   Cognition    Cognition / Consciousness X   Speech/ Communication Hard of Hearing   New Learning Impaired   Comments Pleasant and agreeable to session. Appears to have some memory deficits, per grandndtr pt is \"stubborn\"    Balance Assessment   Sitting Balance (Static) Fair +   Sitting Balance (Dynamic) Fair +   Standing Balance (Static) Fair   Standing Balance (Dynamic) Fair   Weight Shift Sitting Good   Weight Shift Standing Fair   Comments w/o AD, slight lob noted which pt corrects self w/o assist or verbal cueing   Bed Mobility    Supine to Sit Supervised   Sit to Supine Supervised   Scooting Supervised   Rolling Supervised   ADL Assessment   Eating Independent   Grooming Supervision;Standing  (oral care and handwashing)   Bathing   (discussed home s/u and AE)   Upper Body Dressing Supervision   Lower Body Dressing Supervision   Toileting Supervision   Functional Mobility   Sit to Stand Supervised   Bed, Chair, Wheelchair Transfer Supervised   Toilet Transfers Supervised   Transfer Method Stand Step   Mobility within room and hallway   Comments w/o AD   Anticipated Discharge Equipment and Recommendations   DC Equipment Recommendations Tub / Shower Seat                 "

## 2021-04-22 NOTE — H&P
Hospital Medicine History & Physical Note    Date of Service  4/21/2021    Primary Care Physician  Leandra Hayes P.A.-C.    Consultants  Gastroenterology(Dr. Vargas)    Code Status  DNAR, I OK    Chief Complaint  Chief Complaint   Patient presents with   • Blood in Urine       History of Presenting Illness  88 y.o. male with a past medical history of prostate cancer, pacemaker secondary to arrhythmia and chronic back pain presents emergency department on 4/21/2021 for 1 day history of orange urine which then turned darker.  Patient denies any abdominal pain, nausea, diarrhea, hematemesis, hematochezia, melena, orthostatic changes or loss of consciousness.    At the emergency department, vital signs with hypotension SBP in the 90s.  Patient saturating room air.  CBC with mild normocytic anemia, no leukocytosis.  Chemistry showing AST 93, , alkaline phosphatase 203, total bilirubin 3.7 and direct bilirubin 0.3. Right upper quadrant ultrasound showing stones and sludge in his gallbladder without signs of cholecystitis or biliary obstruction.  EDP discussed case with gastroenterology (Dr. Vargas), who recommended hospital admission for abdominal MRI.  Patient was admitted for painless obstructive jaundice work-up and management.    Review of Systems  Review of Systems   Constitutional: Negative.    HENT: Negative.    Eyes: Negative.    Respiratory: Negative.    Cardiovascular: Negative.    Gastrointestinal: Negative.    Genitourinary: Negative.    Musculoskeletal: Negative.    Skin: Negative.    Neurological: Negative.    Endo/Heme/Allergies: Negative.    Psychiatric/Behavioral: Negative.        Past Medical History   has a past medical history of Cancer (HCC) (2011), Cataract (01/27/2021), Diarrhea, Pacemaker, and Urinary incontinence (01/27/2021).    Surgical History   has a past surgical history that includes pacemaker insertion; other; tonsillectomy; and cataract phaco with iol (Right, 2/10/2021).      Family History  family history includes Heart Disease in his father.     Social History   reports that he has never smoked. He has never used smokeless tobacco. He reports that he does not drink alcohol and does not use drugs.    Allergies  No Known Allergies    Medications  None       Physical Exam  Temp:  [37.2 °C (98.9 °F)] 37.2 °C (98.9 °F)  Pulse:  [66-76] 67  Resp:  [18] 18  BP: ()/(55-63) 128/60  SpO2:  [93 %-97 %] 93 %    Physical Exam  Constitutional:       General: He is not in acute distress.     Appearance: Normal appearance. He is not ill-appearing, toxic-appearing or diaphoretic.   HENT:      Head: Normocephalic and atraumatic.      Nose: Nose normal. No congestion.      Mouth/Throat:      Mouth: Mucous membranes are moist.   Eyes:      General: Scleral icterus present.      Extraocular Movements: Extraocular movements intact.      Pupils: Pupils are equal, round, and reactive to light.   Cardiovascular:      Rate and Rhythm: Normal rate and regular rhythm.      Pulses: Normal pulses.      Heart sounds: Normal heart sounds.   Pulmonary:      Effort: Pulmonary effort is normal.      Breath sounds: Normal breath sounds.   Abdominal:      General: Bowel sounds are normal. There is no distension.      Palpations: Abdomen is soft.      Tenderness: There is no abdominal tenderness. There is no guarding or rebound.   Musculoskeletal:         General: No swelling. Normal range of motion.      Cervical back: Normal range of motion and neck supple.   Skin:     General: Skin is warm.      Coloration: Skin is jaundiced.   Neurological:      General: No focal deficit present.      Mental Status: He is alert and oriented to person, place, and time. Mental status is at baseline.      Cranial Nerves: No cranial nerve deficit.   Psychiatric:         Mood and Affect: Mood normal.         Behavior: Behavior normal.         Thought Content: Thought content normal.         Judgment: Judgment normal.          Laboratory:  Recent Labs     04/21/21  1250   WBC 4.9   RBC 4.30*   HEMOGLOBIN 13.8*   HEMATOCRIT 42.5   MCV 98.8*   MCH 32.1   MCHC 32.5*   RDW 50.4*   PLATELETCT 166   MPV 10.2     Recent Labs     04/21/21  1250   SODIUM 143   POTASSIUM 3.8   CHLORIDE 106   CO2 27   GLUCOSE 92   BUN 25*   CREATININE 1.22   CALCIUM 9.4     Recent Labs     04/21/21  1250   ALTSGPT 227*   ASTSGOT 93*   ALKPHOSPHAT 203*   TBILIRUBIN 3.7*   DBILIRUBIN 2.3*   GLUCOSE 92         No results for input(s): NTPROBNP in the last 72 hours.      No results for input(s): TROPONINT in the last 72 hours.    Imaging:  US-RUQ   Final Result      1.  Stones and sludge in the gallbladder.   2.  No other evidence to indicate acute cholecystitis or biliary obstruction.   3.  Limited evaluation of the pancreas.         CT-ABDOMEN-PELVIS WITH   Final Result      1.  No acute inflammatory or obstructive process.      2.  Stable postoperative changes involving the prostate gland.      3.  Minimal interstitial opacity in the posterior aspect of each lower lobe which may represent      4.  Small bilateral fat-containing inguinal hernias are unchanged.      [id number]    (Results Pending)         Assessment/Plan:  I anticipate this patient will require at least two midnights for appropriate medical management, necessitating inpatient admission.    * Painless jaundice- (present on admission)  Assessment & Plan  Noted for jaundice at ED  Etiology unclear  Right upper quadrant ultrasound with sludge but no signs of bowel obstruction  EDP discussed case with GI who recommended admission for abdominal MRI  Admit to medical floor  Clear liquid diet  Pending abdominal MRI  GI following (Dr. Vargas), appreciate recommendations  Labs on AM    DNAR (do not attempt resuscitation)- (present on admission)  Assessment & Plan  Had extensive conversation with patient with granddaughter at bedside  Explained what CPR was for which patient reported that he did not  want CPR to be conducted  Patient did want to be intubated if he needed ventilator support and is okay with being intubated  Thus patient CODE STATUS is DNAR, I okay    Elevated bilirubin- (present on admission)  Assessment & Plan  As per above    Elevated LFTs- (present on admission)  Assessment & Plan  ALT in the 200s  IV hydration  Labs in a.m.    DVT prophylaxis SCDs

## 2021-04-22 NOTE — CONSULTS
DATE OF SERVICE:  04/21/2021     GASTROENTEROLOGY CONSULTATION     REQUESTED BY:  Dr. Oglesby.     REASON FOR REQUEST:  Jaundice.     HISTORY OF PRESENT ILLNESS:  The patient is a very pleasant 88-year-old male   brought in by family members when it was noticed that he was slightly yellow.    He does complain of what he called dark or orange urine for several days and   on Saturday night, now 4 days ago, he had a sudden episode of nausea and   vomiting, but denies any abdominal pain.  He denies any chest pain, no   shortness of breath.  He thinks that his urine is now clear, although he   cannot completely remember.  He denies any debra-colored stools.  No new   medications.  He had a pacemaker placed 15 years ago and then a replacement   2-1/2 to 3 years ago here that appears to be a [employer] model that   appears to be not MRI compatible.  Currently, he states that he feels quite   well.  He denies any weight loss, no vomiting.  No rashes.  No new urinary   complaints.     REVIEW OF SYSTEMS:  Positive as noted above, otherwise complete review of   systems is negative other than that noted in HPI above.     PAST MEDICAL HISTORY:  1.  Prostate cancer, in remission.  2.  Cataract surgery.  3.  Arrhythmia.     PAST SURGICAL HISTORY:  Pacemaker placement, tonsillectomy, and cataract   removals bilaterally.     ALLERGIES:  He has no known drug allergies.     MEDICATIONS AT HOME:  None.     SOCIAL HISTORY:  He does not smoke.  He does not drink.  He does not do any   drugs.     FAMILY MEDICAL HISTORY:  Noncontributory.     PHYSICAL EXAMINATION:    VITAL SIGNS:  Blood pressure 128/60 with a heart rate of 67, respiratory rate   of 16, satting 93% on room air.  He is afebrile.  GENERAL:  He is a very pleasant, thin, mildly icteric white male in no acute   distress.  HEENT:  Reveals that his extraocular movements are intact bilaterally.  His   sclerae revealed very mild icterus bilaterally.  Oral exam reveals a    Mallampati 2 score without oral lesions.  CARDIOVASCULAR:  Regular rate and rhythm without murmurs.  LUNGS:  Clear to auscultation in the anterior lung fields bilaterally.  ABDOMEN:  Soft, nontender, nondistended.  No organomegaly or masses   appreciated.  No rebound tenderness.  EXTREMITIES:  Evaluation reveals no pitting edema bilaterally.  SKIN:  Significant for mild jaundice.     LABORATORY DATA:  CBC reveals a white blood cell count 4.9, hemoglobin 13.8,   hematocrit 42.5, platelet count 166.  BMP reveals sodium 143, potassium 3.8,   chloride is 106, bicarbonate 27, BUN 25, creatinine 1.2, glucose 92.  Liver   enzymes reveal an AST of 93, , alkaline phosphatase 203, total   bilirubin 3.7.     DIAGNOSTIC DATA:  Ultrasound reveals evidence of cholelithiasis without any   common bile duct dilation, measured at 5.4 mm.  No evidence of cholecystitis.    CT scan overall unremarkable.     IMPRESSION/PLAN/MEDICAL DECISION MAKIN.  Jaundice.  Certainly concerned for obstructive jaundice here with newly   noted icterus.  The imaging certainly does not suggest an obstructive cause.    However, we would like to get an MRCP, but his pacemaker appears to be non-MRI   compatible.  Given all of this, I have discussed with him and the family   member that we should repeat his liver enzymes in the morning.  If there is   marked improvement, we may be able to hold off on doing an ERCP.  However, if   they continue to remain elevated, he likely will need an ERCP and I went into   detail with him the indications for that as well as the risks, the benefits,   the alternatives and I will have him sign a consent and I will make him n.p.o.   after midnight if in case it looks like he does need to have the ERCP and it   can be done tomorrow, which is certainly not certain.  2.  Abnormal liver enzymes.  See discussion above.        ______________________________  FAUSTINO HARLEY MD    EMO/SUB    DD:  2021 20:56  DT:   04/21/2021 22:54    Job#:  [id number]

## 2021-04-22 NOTE — PROGRESS NOTES
Pt is A+Ox4    Forgetful at times but easily re-oriented    Granddaughter and MD at bedside   VSS, room air   Tolerating a clear liquid diet    Denies N/V/D    NPO sips w/ meds at 0000 per MD order   LBM PTA, -void   Ambulating as a SBA, slightly unsteady, hand hold assist   See skin note for assessment   Admission profile competed w/ the help of family   Pt oriented to new floor/room, updated on POC, appropriate fall precautions in place, bed alarm on, all questions answered, all needs met at this time   Hourly rounding in place

## 2021-04-22 NOTE — ASSESSMENT & PLAN NOTE
Had extensive conversation with patient with granddaughter at bedside  Explained what CPR was for which patient reported that he did not want CPR to be conducted  Patient did want to be intubated if he needed ventilator support and is okay with being intubated  Thus patient CODE STATUS is DNAR, I okay

## 2021-04-22 NOTE — DISCHARGE PLANNING
Anticipated Discharge Disposition: TBD    Action: This case was discussed during IDT Rounds. Per MD, this patient will be assessed by GI and PTOT.    Barriers to Discharge: Medical Clearance.    Plan: LSW will assist with post acute needs, pending recommendations from the medical team.

## 2021-04-22 NOTE — ED NOTES
Pt transferring ot T431/01 via [facility]/ [facility], pt A&Ox4, SBA, RA. Daughter at bedside, belongings in possession. NAD noted.

## 2021-04-22 NOTE — PROGRESS NOTES
[facility] Medicine Daily Progress Note    Date of Service  4/22/2021    Chief Complaint  88 y.o. male admitted 4/21/2021 with h/o prostate cancer and PPM comes in with darker urine found to have painless jaundice.    Hospital Course  No notes on file    Interval Problem Update  Jaundice-liver labs better today. Has no pain, nausea or vomiting. Had one bout of emesis on prior Saturday. Hungry. Could not get MRCP given incompatible PPM    Consultants/Specialty  GI    Code Status  DNAR, I OK    Disposition  Medical,  services ordered    Review of Systems  Review of Systems   Constitutional: Positive for weight loss. Negative for chills and fever.   Gastrointestinal: Negative for abdominal pain, nausea and vomiting.   All other systems reviewed and are negative.       Physical Exam  Temp:  [36 °C (96.8 °F)-36.8 °C (98.3 °F)] 36.4 °C (97.6 °F)  Pulse:  [61-72] 61  Resp:  [16-18] 17  BP: (124-142)/(58-70) 142/70  SpO2:  [93 %-97 %] 95 %    Physical Exam  Vitals and nursing note reviewed.   Constitutional:       General: He is not in acute distress.     Appearance: He is well-developed.      Comments: Thin, agreeable  Appears younger than stated age   HENT:      Head: Normocephalic and atraumatic.      Mouth/Throat:      Pharynx: No oropharyngeal exudate.   Eyes:      General: Scleral icterus (very mild) present.      Pupils: Pupils are equal, round, and reactive to light.   Neck:      Thyroid: No thyromegaly.   Cardiovascular:      Rate and Rhythm: Normal rate and regular rhythm.      Heart sounds: Normal heart sounds. No murmur.   Pulmonary:      Effort: Pulmonary effort is normal. No respiratory distress.      Breath sounds: Normal breath sounds. No wheezing.   Abdominal:      General: Bowel sounds are normal. There is no distension.      Palpations: Abdomen is soft.      Tenderness: There is no abdominal tenderness. There is no guarding or rebound.   Musculoskeletal:         General: No tenderness. Normal range of  motion.      Cervical back: Normal range of motion and neck supple.      Right lower leg: No edema.      Left lower leg: No edema.   Skin:     General: Skin is warm and dry.      Findings: No rash.   Neurological:      Mental Status: He is alert and oriented to person, place, and time.      Cranial Nerves: No cranial nerve deficit.         Fluids    Intake/Output Summary (Last 24 hours) at 4/22/2021 1449  Last data filed at 4/22/2021 0400  Gross per 24 hour   Intake 697.6 ml   Output 150 ml   Net 547.6 ml       Laboratory  Recent Labs     04/21/21  1250 04/22/21  0302   WBC 4.9 4.0*   RBC 4.30* 3.66*   HEMOGLOBIN 13.8* 12.0*   HEMATOCRIT 42.5 35.7*   MCV 98.8* 97.5   MCH 32.1 32.8   MCHC 32.5* 33.6*   RDW 50.4* 49.1   PLATELETCT 166 139*   MPV 10.2 9.9     Recent Labs     04/21/21  1250 04/22/21  0302   SODIUM 143 143   POTASSIUM 3.8 3.8   CHLORIDE 106 108   CO2 27 27   GLUCOSE 92 81   BUN 25* 23*   CREATININE 1.22 0.95   CALCIUM 9.4 8.9                   Imaging  US-RUQ   Final Result      1.  Stones and sludge in the gallbladder.   2.  No other evidence to indicate acute cholecystitis or biliary obstruction.   3.  Limited evaluation of the pancreas.         CT-ABDOMEN-PELVIS WITH   Final Result      1.  No acute inflammatory or obstructive process.      2.  Stable postoperative changes involving the prostate gland.      3.  Minimal interstitial opacity in the posterior aspect of each lower lobe which may represent      4.  Small bilateral fat-containing inguinal hernias are unchanged.      [id number]    (Results Pending)        Assessment/Plan  * Painless jaundice- (present on admission)  Assessment & Plan  Noted for jaundice at ED, painless, some mild weight loss over the past 2 years, no other new symptoms  _CEA normal  -CA 19-9 pending  -Cant get MRI 2/2 PPM  -CT scan and RUQ US with gallstones, but normal CBD and no clear pancreatic masses  -Liver labs have improved, remains asymptomatic  -?monitor  overnight and then outpatient follow up, defer ERCP for now  GI following (Dr. Vargas), appreciate recommendations      DNAR (do not attempt resuscitation)- (present on admission)  Assessment & Plan  Had extensive conversation with patient with granddaughter at bedside  Explained what CPR was for which patient reported that he did not want CPR to be conducted  Patient did want to be intubated if he needed ventilator support and is okay with being intubated  Thus patient CODE STATUS is DNAR, I okay       VTE prophylaxis: SCD's

## 2021-04-22 NOTE — PROGRESS NOTES
2 RN SKIN CHECK     Generalized integrity is fragile, pink, and intact    Very faint yellowing in sclera   Pt turns independently, pillows in use for support/positioning, no areas of breakdown noted   Appropriate skin breakdown prevention measures in place

## 2021-04-22 NOTE — DIETARY
"Nutrition services: Day 1 of admit.  Chico Cedillo is a 88 y.o. male with admitting DX of Obstructive jaundice.  Consult received for MST score of 2 per nutrition screen for unplanned weight loss of 2-13 lb x <1 week with poor PO intake.    Assessment:  Height: (P) 162.6 cm (5' 4\")  Weight: 59.1 kg (130 lb 4.7 oz)  Body mass index is 22.36 kg/m² (pended)., BMI classification: Normal  Diet/Intake: Regular, Low fat    Evaluation:   1. Pt states his weight has been stable at 127 lb on his home scale past couple of months. Current weight is 3 lb higher.  2. Pt denies poor PO intake at home. States he eats 3 meals per day. Pt was concerned about being on his second day of no food. He had a clear liquid diet ordered at admit yesterday and was NPO after midnight. Diet just advanced and lunch meal tray was delivered at time of visit.  3. No skin breakdown per 2 RN skin check.  4. Labs and medications reviewed.    Malnutrition Risk: Criteria not met.    Recommendations/Plan:   1. Encourage intake of >50% of meals.  2. Document intake of all meals as % taken in ADL's to provide interdisciplinary communication across all shifts.   3. Monitor weight.  4. Nutrition rep will continue to see patient for ongoing meal and snack preferences.           "

## 2021-04-22 NOTE — CARE PLAN
Problem: Knowledge Deficit  Goal: Knowledge of disease process/condition, treatment plan, diagnostic tests, and medications will improve  Outcome: PROGRESSING AS EXPECTED  Pt educated on POC, NPO meaning and time it begins, as well as POC for night shift      Problem: Safety  Goal: Will remain free from falls  Outcome: PROGRESSING AS EXPECTED  Pt scored as a moderate fall risk, bed alarm applied per policy   Pt educated on fall risk and precautions        Problem: Knowledge Deficit  Goal: Knowledge of the prescribed therapeutic regimen will improve  Outcome: PROGRESSING AS EXPECTED  Pt educated on PIV fluids as well as scheduled medications   No questions at this time

## 2021-04-23 VITALS
DIASTOLIC BLOOD PRESSURE: 75 MMHG | HEART RATE: 61 BPM | SYSTOLIC BLOOD PRESSURE: 145 MMHG | TEMPERATURE: 98.6 F | WEIGHT: 130.29 LBS | OXYGEN SATURATION: 93 % | HEIGHT: 64 IN | RESPIRATION RATE: 16 BRPM | BODY MASS INDEX: 22.24 KG/M2

## 2021-04-23 LAB
ALBUMIN SERPL BCP-MCNC: 3.4 G/DL (ref 3.2–4.9)
ALBUMIN/GLOB SERPL: 1.5 G/DL
ALP SERPL-CCNC: 147 U/L (ref 30–99)
ALT SERPL-CCNC: 113 U/L (ref 2–50)
ANION GAP SERPL CALC-SCNC: 6 MMOL/L (ref 7–16)
AST SERPL-CCNC: 35 U/L (ref 12–45)
BACTERIA UR CULT: NORMAL
BILIRUB SERPL-MCNC: 1.9 MG/DL (ref 0.1–1.5)
BUN SERPL-MCNC: 21 MG/DL (ref 8–22)
CALCIUM SERPL-MCNC: 8.9 MG/DL (ref 8.5–10.5)
CHLORIDE SERPL-SCNC: 110 MMOL/L (ref 96–112)
CO2 SERPL-SCNC: 27 MMOL/L (ref 20–33)
CREAT SERPL-MCNC: 0.9 MG/DL (ref 0.5–1.4)
GLOBULIN SER CALC-MCNC: 2.2 G/DL (ref 1.9–3.5)
GLUCOSE SERPL-MCNC: 90 MG/DL (ref 65–99)
POTASSIUM SERPL-SCNC: 3.7 MMOL/L (ref 3.6–5.5)
PROT SERPL-MCNC: 5.6 G/DL (ref 6–8.2)
SIGNIFICANT IND 70042: NORMAL
SITE SITE: NORMAL
SODIUM SERPL-SCNC: 143 MMOL/L (ref 135–145)
SOURCE SOURCE: NORMAL

## 2021-04-23 PROCEDURE — 36415 COLL VENOUS BLD VENIPUNCTURE: CPT

## 2021-04-23 PROCEDURE — 99239 HOSP IP/OBS DSCHRG MGMT >30: CPT | Performed by: INTERNAL MEDICINE

## 2021-04-23 PROCEDURE — 80053 COMPREHEN METABOLIC PANEL: CPT

## 2021-04-23 ASSESSMENT — LIFESTYLE VARIABLES
EVER HAD A DRINK FIRST THING IN THE MORNING TO STEADY YOUR NERVES TO GET RID OF A HANGOVER: NO
HAVE YOU EVER FELT YOU SHOULD CUT DOWN ON YOUR DRINKING: NO
ALCOHOL_USE: NO
EVER FELT BAD OR GUILTY ABOUT YOUR DRINKING: NO
AVERAGE NUMBER OF DAYS PER WEEK YOU HAVE A DRINK CONTAINING ALCOHOL: 0
TOTAL SCORE: 0
TOTAL SCORE: 0
ON A TYPICAL DAY WHEN YOU DRINK ALCOHOL HOW MANY DRINKS DO YOU HAVE: 0
TOTAL SCORE: 0
HOW MANY TIMES IN THE PAST YEAR HAVE YOU HAD 5 OR MORE DRINKS IN A DAY: 0
HAVE PEOPLE ANNOYED YOU BY CRITICIZING YOUR DRINKING: NO
CONSUMPTION TOTAL: NEGATIVE
DOES PATIENT WANT TO STOP DRINKING: NO

## 2021-04-23 NOTE — DISCHARGE PLANNING
Care Transition Team Assessment    Information Source  Orientation : Oriented x 4  Information Given By: Patient  Informant's Name: (Chico)  Who is responsible for making decisions for patient? : Patient    Readmission Evaluation  Is this a readmission?: No    Elopement Risk  Legal Hold: No  Ambulatory or Self Mobile in Wheelchair: Yes  Disoriented: No  Psychiatric Symptoms: None  History of Wandering: No  Elopement this Admit: No  Vocalizing Wanting to Leave: No  Displays Behaviors, Body Language Wanting to Leave: No-Not at Risk for Elopement  Elopement Risk: Not at Risk for Elopement    Interdisciplinary Discharge Planning  Lives with - Patient's Self Care Capacity: Alone and Able to Care For Self  Patient or legal guardian wants to designate a caregiver: No  Housing / Facility: Mobile Home  Prior Services: Other (Comments)(family checks in on pt daily)    Discharge Preparedness  What is your plan after discharge?: Home health care  What are your discharge supports?: Child         Finances  Financial Barriers to Discharge: No  Prescription Coverage: Yes    Vision / Hearing Impairment  Vision Impairment : Yes  Right Eye Vision: Impaired, Wears Glasses  Left Eye Vision: Impaired, Wears Glasses  Hearing Impairment : Yes  Hearing Impairment: Both Ears  Does Pt Need Special Equipment for the Hearing Impaired?: No         Advance Directive  Advance Directive?: None    Domestic Abuse  Have you ever been the victim of abuse or violence?: No  Physical Abuse or Sexual Abuse: No  Verbal Abuse or Emotional Abuse: No  Possible Abuse/Neglect Reported to:: Not Applicable    Psychological Assessment  History of Substance Abuse: None    Discharge Risks or Barriers  Discharge risks or barriers?: No    Anticipated Discharge Information  Discharge Disposition: Discharged to home/self care (01)

## 2021-04-23 NOTE — DISCHARGE PLANNING
Received Choice form at 0911  Agency/Facility Name: [facility]   Referral sent per Choice form @ [phone]  Agency/Fac[facility] Name: [facility]  Outcome: Per vmail response, failure to thrive is not a valid diagnosis for HH    LSW Maru notified     [facility] Name: [facility]  Spoke To: Sherly  Outcome: Per Sherly, [facility] will consider a diagnosis related to past cancer or pace maker     [facility]PA resent referral     [phone]  [facility] Name: [facility]  Outcome: Left vmail for intake regarding referral     [facility] Name[facility]  Spoke To: Chaparrita  Outcome: Pt is accepted and good to D/C today     LSW Maru notified

## 2021-04-23 NOTE — DISCHARGE INSTRUCTIONS
Discharge Instructions    Discharged to home by car with relative. Discharged via wheelchair, hospital escort: Yes.  Special equipment needed: Not Applicable    Be sure to schedule a follow-up appointment with your primary care doctor or any specialists as instructed.     Discharge Plan:        I understand that a diet low in cholesterol, fat, and sodium is recommended for good health. Unless I have been given specific instructions below for another diet, I accept this instruction as my diet prescription.   Other diet: regular low fat    Special Instructions: None    · Is patient discharged on Warfarin / Coumadin?   No     Depression / Suicide Risk    As you are discharged from this [facility] facility, it is important to learn how to keep safe from harming yourself.    Recognize the warning signs:  · Abrupt changes in personality, positive or negative- including increase in energy   · Giving away possessions  · Change in eating patterns- significant weight changes-  positive or negative  · Change in sleeping patterns- unable to sleep or sleeping all the time   · Unwillingness or inability to communicate  · Depression  · Unusual sadness, discouragement and loneliness  · Talk of wanting to die  · Neglect of personal appearance   · Rebelliousness- reckless behavior  · Withdrawal from people/activities they love  · Confusion- inability to concentrate     If you or a loved one observes any of these behaviors or has concerns about self-harm, here's what you can do:  · Talk about it- your feelings and reasons for harming yourself  · Remove any means that you might use to hurt yourself (examples: pills, rope, extension cords, firearm)  · Get professional help from the community (Mental Health, Substance Abuse, psychological counseling)  · Do not be alone:Call your Safe Contact- someone whom you trust who will be there for you.  · Call your local CRISIS HOTLINE 747-7018 or 264-207-0455  · Call your local Children's  Mobile Crisis Response Team [place] (134) 665-5458 or www.MaidSafe  · Call the toll free National Suicide Prevention Hotlines   · National Suicide Prevention Lifeline 651-871-MUDH (0015)  · [facility] Line Network 800-SUICIDE (573-3049)      Endoscopic Retrograde Cholangiopancreatogram    Endoscopic retrograde cholangiopancreatogram (ERCP) is a procedure that may be used to diagnose or treat problems with the pancreas, bile ducts, liver, and gallbladder. For this procedure, a thin, lighted tube (endoscope) is passed through the mouth, the throat, and down into the areas being checked. The endoscope has a camera that allows the areas to be viewed. Dye is injected and then X-rays are taken to further study the areas.  During ERCP, other procedures may also be done to help diagnose or treat problems that are found. For example, stones can be removed, or a tissue sample can be taken out for testing (biopsy).  Tell a health care provider about:  · Any allergies you have.  · All medicines you are taking, including vitamins, herbs, eye drops, creams, and over-the-counter medicines.  · Any problems you or family members have had with anesthetic medicines.  · Any blood disorders you have.  · Any surgeries you have had.  · Any medical conditions you have.  · Whether you are pregnant or may be pregnant.  What are the risks?  Generally, this is a safe procedure. However, problems may occur, including:  · Pancreatitis.  · Infection.  · Bleeding.  · Allergic reactions to medicines or dyes.  · Accidental punctures in the bowel wall, pancreas, or gallbladder.  · Damage to other structures or organs.  What happens before the procedure?  Staying hydrated  Follow instructions from your health care provider about hydration, which may include:  · Up to 2 hours before the procedure - you may continue to drink clear liquids, such as water, clear fruit juice, black coffee, and plain tea.  Eating and drinking  restrictions  Follow instructions from your health care provider about eating and drinking, which may include:  · 8 hours before the procedure - stop eating heavy meals or foods such as meat, fried foods, or fatty foods.  · 6 hours before the procedure - stop eating light meals or foods, such as toast or cereal.  · 6 hours before the procedure - stop drinking milk or drinks that contain milk.  · 2 hours before the procedure - stop drinking clear liquids.  General instructions  · Ask your health care provider about:  ? Changing or stopping your regular medicines. This is especially important if you are taking diabetes medicines or blood thinners.  ? Taking medicines such as aspirin and ibuprofen. These medicines can thin your blood. Do not take these medicines before your procedure if your health care provider instructs you not to.  · Plan to have someone take you home from the hospital or clinic.  · If you will be going home right after the procedure, plan to have someone with you for 24 hours.  What happens during the procedure?  · To lower your risk of infection, your health care team will wash or sanitize their hands.  · An IV tube will be inserted into one of your veins.  · You will be given one or more of the following:  ? A medicine to help you relax (sedative).  ? A medicine to numb the throat area (local anesthetic) and prevent gagging. Your throat may be sprayed with this medicine, or you may gargle the medicine.  ? A medicine to make you fall asleep (general anesthetic).  ? A medicine to lower your risk of infection (antibiotic), inflammation (anti-inflammatory), or both.  · You will lie on your left side.  · The endoscope will be inserted through your mouth, down the back of the throat, and into the first part of the small intestine (duodenum).  · Then a small, plastic tube (cannula) will be passed through the endoscope and directed into the bile duct or pancreatic duct.  · Dye will be injected through  the cannula to make structures easier to see on an X-ray.  · X-rays will be taken to study the biliary and pancreatic passageways. You may be positioned on your abdomen or your back during the X-rays.  · A small sample of tissue (biopsy) may be removed for examination, or other procedures may be done to fix problems that are found.  The procedure may vary among health care providers and hospitals.  What happens after the procedure?  · Your blood pressure, heart rate, breathing rate, and blood oxygen level will be monitored until the medicines you were given have worn off.  · Your throat may feel slightly sore.  · You will not be allowed to eat or drink until numbness subsides.  · Do not drive for 24 hours if you were given a sedative.  Summary  · Endoscopic retrograde cholangiopancreatogram is a procedure that may be used to diagnose or treat problems with the pancreas, bile ducts, liver, and gallbladder.  · During ERCP, other procedures may also be done to help diagnose or treat problems that are found. For example, stones can be removed, or a tissue sample can be taken out for testing (biopsy).  · Generally, this is a safe procedure. However, problems may occur, including infection, bleeding, pancreatitis, accidental damage to other structures or organs, and allergic reactions to medicines or dyes.  · The procedure may vary among health care providers and hospitals.  This information is not intended to replace advice given to you by your health care provider. Make sure you discuss any questions you have with your health care provider.  Document Released: 09/12/2002 Document Revised: 11/30/2018 Document Reviewed: 11/13/2017  [employer] Patient Education © 2020 [employer]

## 2021-04-23 NOTE — CARE PLAN
Problem: Safety  Goal: Will remain free from injury  Outcome: PROGRESSING AS EXPECTED  Note: Moderate fall risk, patient educated not to get out of bed without staff present.  Bed alarm in use     Problem: Discharge Barriers/Planning  Goal: Patient's continuum of care needs will be met  Outcome: PROGRESSING AS EXPECTED  Note: Discharge orders in place, pending HH acceptance.

## 2021-04-23 NOTE — PROGRESS NOTES
"Assumed care of patient from night shift RN.  Patient is alert and oriented times 4, denies pain at this time.  VSS /75   Pulse 61   Temp 37 °C (98.6 °F) (Temporal)   Resp 16   Ht 1.626 m (5' 4\")   Wt 59.1 kg (130 lb 4.7 oz)   SpO2 93%   BMI 22.36 kg/m²   PIV in the RFA, patent and running LR at 83mL/hr.  On RA with saturations in the mid 90s.  Last BM 4/22, urinating without difficulty.  Regular low fat diet, tolerating well.  Skin intact.  Patient is a SBA, unsteady gait.  Moderate fall risk, bed alarm in use.  POC discussed for the day, bed is locked and in the lowest position, call light is within reach.  All needs are met at this time, hourly rounding is in place.  Discharge orders in place, awaiting HH acceptance.  "

## 2021-04-23 NOTE — FACE TO FACE
Face to Face Note  -  Durable Medical Equipment    Sonny Mendoza M.D. - NPI: [id number]  I certify that this patient is under my care and that they had a durable medical equipment(DME)face to face encounter by myself that meets the physician DME face-to-face encounter requirements with this patient on:    Date of encounter:   Patient:                    MRN:                       YOB: 2021  Chico Cedillo  [id number]  8/27/1932     The encounter with the patient was in whole, or in part, for the following medical condition, which is the primary reason for durable medical equipment:  Other - frailty of elderly    I certify that, based on my findings, the following durable medical equipment is medically necessary:  Other DME Equipment - tub chair.    HOME O2 Saturation Measurements:(Values must be present for Home Oxygen orders)         ,     ,         My Clinical findings support the need for the above equipment due to:  Abnormal Gait    Supporting Symptoms: weakness    If patient feels more short of breath, they can go up to 6 liters per minute and contact healthcare provider.

## 2021-04-23 NOTE — PROGRESS NOTES
Discharging Patient home per physician order.  Discharged with family.  Demonstrated understanding of discharge instructions, follow up appointments, home medications, prescriptions, home care for surgical wound, and nursing care instructions for ERCP.  Ambulating with assistance, voiding without difficulty, pain well controlled, tolerating oral medications, oxygen saturation greater than 90% , tolerating diet.   Educational handouts given and discussed.  Verbalized understanding of discharge instructions and educational handouts.  All questions answered.  Belongings with patient at time of discharge.

## 2021-04-23 NOTE — DISCHARGE PLANNING
Anticipated Discharge Disposition: Home with [facility].    Action: HHC Order received. LSW met with this patient and his daughter Lianna at bedside. The patient agreed with C. The patient requested the referral is sent to (1) [facility], (2) [facility], choice form faxed to [facility].    Barriers to Discharge: None.    Plan: Home with [facility], pending confirmation of approval for [facility].

## 2021-04-24 LAB
CANCER AG19-9 SERPL-ACNC: 80 U/ML (ref 0–37)
NUCLEAR IGG SER QL IA: NORMAL
TEST NAME 95000: NORMAL

## 2021-04-26 ENCOUNTER — PATIENT OUTREACH (OUTPATIENT)
Dept: MEDICAL GROUP | Facility: PHYSICIAN GROUP | Age: 86
End: 2021-04-26

## 2021-04-26 PROBLEM — Z09 HOSPITAL DISCHARGE FOLLOW-UP: Status: ACTIVE | Noted: 2021-04-26

## 2021-04-26 NOTE — ASSESSMENT & PLAN NOTE
Patient admitted to the hospital on April 21, 2021 with dark urine and elevated LFTs and total bilirubin.  GI was consulted.  Normal CT of the abdomen and pelvis.  Ultrasound did show gallstones but no cholecystitis.  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  ERCP was deferred given self correction of labs.  They did an autoimmune work-up as well.  CEA was normal.  CA 19-9 was elevated at 80 MIKAELA was negative.  Repeat LFTs 422 has an ALT at 113, alkaline phosphatase 147, total bilirubin 1.9.

## 2021-04-27 ENCOUNTER — OFFICE VISIT (OUTPATIENT)
Dept: MEDICAL GROUP | Facility: PHYSICIAN GROUP | Age: 86
End: 2021-04-27
Payer: MEDICARE

## 2021-04-27 VITALS
WEIGHT: 131 LBS | TEMPERATURE: 97.7 F | BODY MASS INDEX: 22.36 KG/M2 | HEART RATE: 69 BPM | HEIGHT: 64 IN | RESPIRATION RATE: 12 BRPM | DIASTOLIC BLOOD PRESSURE: 64 MMHG | OXYGEN SATURATION: 97 % | SYSTOLIC BLOOD PRESSURE: 118 MMHG

## 2021-04-27 DIAGNOSIS — Z09 HOSPITAL DISCHARGE FOLLOW-UP: Primary | ICD-10-CM

## 2021-04-27 DIAGNOSIS — R97.8 ELEVATED CA 19-9 LEVEL: ICD-10-CM

## 2021-04-27 DIAGNOSIS — R17 PAINLESS JAUNDICE: ICD-10-CM

## 2021-04-27 PROCEDURE — 99495 TRANSJ CARE MGMT MOD F2F 14D: CPT | Performed by: PHYSICIAN ASSISTANT

## 2021-04-27 ASSESSMENT — PATIENT HEALTH QUESTIONNAIRE - PHQ9: CLINICAL INTERPRETATION OF PHQ2 SCORE: 0

## 2021-04-27 ASSESSMENT — FIBROSIS 4 INDEX: FIB4 SCORE: 2.08

## 2021-04-27 NOTE — PROGRESS NOTES
Subjective:     Chico Cedillo is a 88 y.o. male who presents for Hospital Follow-up.    POST DISCHARGE CALL:  Discharge Date:4/23/2021   Date of Outreach Call: 4/26/2021 10:31 AM  Now that you're home, how are you doing? Good  Do you have questions about your medications? No  Did you fill your medications? Yes  Do you have a follow-up appointment scheduled?No  Discharging Department: [facility]  Number of Attempts: *  Current or previous attempts completed within two business days of discharge? *  Provided education regarding treatment plan, medication, self-management, ADLs? *  Has patient completed Advance Directive? If yes, advise them to bring to appointment. *  Care Manager phone number provided? *  Is there anything else I can help you with? No    HPI:   Recently hospitalized for:     Hospital discharge follow-up  Patient admitted to the hospital on April 21, 2021 with dark urine and elevated LFTs and total bilirubin.  GI was consulted.  Normal CT of the abdomen and pelvis.  Ultrasound did show gallstones but no cholecystitis.  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  ERCP was deferred given self correction of labs.  They did an autoimmune work-up as well.  CEA was normal.  CA 19-9 was elevated at 80 MIKAELA was negative.  Repeat LFTs 422 has an ALT at 113, alkaline phosphatase 147, total bilirubin 1.9.         Current medicines (including reconciliation performed today)  No current outpatient medications on file.     No current facility-administered medications for this visit.       Allergies:   Patient has no known allergies.    Social History     Tobacco Use   • Smoking status: Never Smoker   • Smokeless tobacco: Never Used   Substance Use Topics   • Alcohol use: No     Alcohol/week: 0.0 oz   • Drug use: No       ROS:  Constitutional: No Fevers, Chills  Eyes: No vision changes  ENT: No hearing changes  Resp: No Shortness of breath  CV: No Chest pain  GI: No Nausea/Vomiting  MSK: No weakness  Skin: No  "rashes  Neuro: No Headaches  Psych: No Suicidal ideations      Objective:     Vitals:    04/27/21 1556   BP: 118/64   Pulse: 69   Resp: 12   Temp: 36.5 °C (97.7 °F)   TempSrc: Temporal   SpO2: 97%   Weight: 59.4 kg (131 lb)   Height: 1.626 m (5' 4\")     Body mass index is 22.49 kg/m².    Physical Exam:  GENERAL: No acute distress, slightly jaundiced.   HENT: Atraumatic, normocephalic  EYES: Extraocular movements intact  NECK: Supple, FROM  CHEST: No deformities, Equal chest expansion  HEART: Regular rate and rhythm, no murmur  RESP: Unlabored, no stridor or audible wheeze  Extremities: No Clubbing, Cyanosis, or Edema  Skin: Warm/dry, without rashes  Neuro: A/O x 4, CN 2-12 Grossly intact, Motor/sensory grossly intact  Psych: Normal behavior, normal affect      Assessment and Plan:   1. Hospital discharge follow-up    2. Elevated CA 19-9 level    3. Painless jaundice  Patient continues home health OT, PT and nursing.   Patient states overall he feels well  Patient will be following up with GI in the next 2 weeks for possible ERCP.  Consulted with hematology and oncology about the elevated CA 19-9.  They suspect this could be from possible bile duct obstruction which is suspected based on ED work-up.  They did recommend repeating the CA 19-9 in the next 2 to 3 months.  We will make a follow-up with the patient in 3 months to review this information.    Patient and his daughter-in-law discussed possibly getting Chico into assisted living.  He is having difficulty maintaining his diet on his own.  I did encourage Chico to consider assisted living.  He has a strong support system with his family, and they are discussing as the family on what is best for off.  They need no further assistance for me at this time but will let me know.    - Chart and discharge summary were reviewed.   - Hospitalization and results reviewed with patient.   - Medications reviewed including instructions regarding high risk medications, dosing " and side effects.  - Recommended Services: No services needed at this time  - Advance directive/POLST on file?  No     Follow-up:Return in about 3 months (around 7/27/2021) for Follow up.    Face-to-face transitional care management services with MODERATE (today's visit is at least 14 days post discharge & LACE+ score of 28-58) medical decision complexity were provided.     LACE+ Historical Score Over Time (0-28: Low, 29-58: Medium, 59+: High): 26

## 2021-05-10 ENCOUNTER — HOSPITAL ENCOUNTER (OUTPATIENT)
Dept: LAB | Facility: MEDICAL CENTER | Age: 86
End: 2021-05-10
Attending: PHYSICIAN ASSISTANT
Payer: MEDICARE

## 2021-05-10 ENCOUNTER — HOSPITAL ENCOUNTER (OUTPATIENT)
Dept: LAB | Facility: MEDICAL CENTER | Age: 86
End: 2021-05-10
Attending: UROLOGY
Payer: MEDICARE

## 2021-05-10 LAB
ALBUMIN SERPL BCP-MCNC: 3.9 G/DL (ref 3.2–4.9)
ALP SERPL-CCNC: 107 U/L (ref 30–99)
ALT SERPL-CCNC: 26 U/L (ref 2–50)
AST SERPL-CCNC: 22 U/L (ref 12–45)
BILIRUB CONJ SERPL-MCNC: 0.4 MG/DL (ref 0.1–0.5)
BILIRUB INDIRECT SERPL-MCNC: 0.4 MG/DL (ref 0–1)
BILIRUB SERPL-MCNC: 0.8 MG/DL (ref 0.1–1.5)
LIPASE SERPL-CCNC: 45 U/L (ref 11–82)
PROT SERPL-MCNC: 6.1 G/DL (ref 6–8.2)
PSA SERPL-MCNC: 0.09 NG/ML (ref 0–4)

## 2021-05-10 PROCEDURE — 84403 ASSAY OF TOTAL TESTOSTERONE: CPT

## 2021-05-10 PROCEDURE — 83690 ASSAY OF LIPASE: CPT

## 2021-05-10 PROCEDURE — 36415 COLL VENOUS BLD VENIPUNCTURE: CPT

## 2021-05-10 PROCEDURE — 86301 IMMUNOASSAY TUMOR CA 19-9: CPT

## 2021-05-10 PROCEDURE — 80076 HEPATIC FUNCTION PANEL: CPT

## 2021-05-10 PROCEDURE — 84153 ASSAY OF PSA TOTAL: CPT

## 2021-05-11 LAB
CANCER AG19-9 SERPL-ACNC: 28 U/ML (ref 0–37)
TESTOST SERPL-MCNC: 6 NG/DL (ref 300–720)

## 2021-06-14 ENCOUNTER — HOSPITAL ENCOUNTER (OUTPATIENT)
Dept: LAB | Facility: MEDICAL CENTER | Age: 86
End: 2021-06-14
Attending: UROLOGY
Payer: MEDICARE

## 2021-06-14 LAB — PSA SERPL-MCNC: 0.05 NG/ML (ref 0–4)

## 2021-06-14 PROCEDURE — 36415 COLL VENOUS BLD VENIPUNCTURE: CPT

## 2021-06-14 PROCEDURE — 84153 ASSAY OF PSA TOTAL: CPT

## 2021-07-28 ENCOUNTER — OFFICE VISIT (OUTPATIENT)
Dept: MEDICAL GROUP | Facility: PHYSICIAN GROUP | Age: 86
End: 2021-07-28
Payer: MEDICARE

## 2021-07-28 VITALS
DIASTOLIC BLOOD PRESSURE: 62 MMHG | HEIGHT: 64 IN | OXYGEN SATURATION: 98 % | RESPIRATION RATE: 12 BRPM | SYSTOLIC BLOOD PRESSURE: 112 MMHG | WEIGHT: 136 LBS | HEART RATE: 69 BPM | TEMPERATURE: 97.6 F | BODY MASS INDEX: 23.22 KG/M2

## 2021-07-28 DIAGNOSIS — D64.9 ANEMIA, UNSPECIFIED TYPE: ICD-10-CM

## 2021-07-28 DIAGNOSIS — R79.89 ELEVATED LFTS: ICD-10-CM

## 2021-07-28 DIAGNOSIS — E78.5 DYSLIPIDEMIA: ICD-10-CM

## 2021-07-28 DIAGNOSIS — Z78.9 POLST (PHYSICIAN ORDERS FOR LIFE-SUSTAINING TREATMENT): ICD-10-CM

## 2021-07-28 DIAGNOSIS — R17 PAINLESS JAUNDICE: ICD-10-CM

## 2021-07-28 DIAGNOSIS — Z23 NEED FOR VACCINATION: ICD-10-CM

## 2021-07-28 DIAGNOSIS — Z85.46 HISTORY OF PROSTATE CANCER: ICD-10-CM

## 2021-07-28 DIAGNOSIS — N28.9 RENAL INSUFFICIENCY: ICD-10-CM

## 2021-07-28 DIAGNOSIS — Z13.6 SCREENING FOR CARDIOVASCULAR CONDITION: ICD-10-CM

## 2021-07-28 PROCEDURE — 99214 OFFICE O/P EST MOD 30 MIN: CPT | Performed by: PHYSICIAN ASSISTANT

## 2021-07-28 ASSESSMENT — FIBROSIS 4 INDEX: FIB4 SCORE: 2.73

## 2021-07-28 NOTE — PROGRESS NOTES
CC:   Chief Complaint   Patient presents with   • Follow-Up   • Dyslipidemia   • Advance Care Planning          HISTORY OF PRESENT ILLNESS: Patient is a 88 y.o. male established patient who presents today to follow up on the following conditions:       Health Maintenance: Completed  Patient states he had Shingles vaccine a few years ago.     Painless jaundice  Patient's last visit was a hospital discharge follow-up visit.  Patient had an elevated CA 19-9 and was admitted and worked up for painless jaundice.  Repeat CA 19-9 May 11, 2021 came back normal at 28.  Reassuring.    Dyslipidemia  Due for repeat labs, no medications currently.    History of prostate cancer  Recently saw Dr. Tejeda, calvin, PSA excellent most recent labs.     POLST (Physician Orders for Life-Sustaining Treatment)  POLST reviewed with patient in detail today, answered all questions and signed.      Patient Active Problem List    Diagnosis Date Noted   • POLST (Physician Orders for Life-Sustaining Treatment) 07/28/2021   • Hospital discharge follow-up 04/26/2021   • Painless jaundice 04/21/2021   • DNAR (do not attempt resuscitation) 04/21/2021   • Dyslipidemia 12/04/2020   • Compression fracture of T8 vertebra (HCC) 12/04/2020   • History of prostate cancer 10/04/2016   • Radiation proctitis 10/04/2016   • Cardiac pacemaker in situ 05/09/2012      Allergies:Patient has no known allergies.    Current Outpatient Medications   Medication Sig Dispense Refill   • Zoster Vac Recomb Adjuvanted (SHINGRIX) 50 MCG/0.5ML Recon Susp Inject 0.5 mL into the shoulder, thigh, or buttocks one time for 1 dose. 0.5 mL 0     No current facility-administered medications for this visit.       Social History     Tobacco Use   • Smoking status: Never Smoker   • Smokeless tobacco: Never Used   Vaping Use   • Vaping Use: Never used   Substance Use Topics   • Alcohol use: No     Alcohol/week: 0.0 oz   • Drug use: No     Social History     Social History Narrative   •  "Not on file       Family History   Problem Relation Age of Onset   • Heart Disease Father        Review of Systems:    Constitutional: No Fevers, Chills  Eyes: No vision changes  ENT: No hearing changes  Resp: No Shortness of breath  CV: No Chest pain  GI: No Nausea/Vomiting  MSK: No weakness  Skin: No rashes  Neuro: No Headaches  Psych: No Suicidal ideations    All remaining systems reviewed and found to be negative, except as stated above.    Exam:    /62   Pulse 69   Temp 36.4 °C (97.6 °F) (Temporal)   Resp 12   Ht 1.626 m (5' 4\")   Wt 61.7 kg (136 lb)   SpO2 98%  Body mass index is 23.34 kg/m².    General:  Well nourished, well developed male in NAD  HENT: Atraumatic, normocephalic  EYES: Extraocular movements intact  NECK: Supple, FROM  CHEST: No deformities, Equal chest expansion  RESP: Unlabored, no stridor or audible wheeze  HEART: Regular Rate and rhythm.   Extremities: No Clubbing, Cyanosis, or Edema  Skin: Warm/dry, without rashes  Neuro: A/O x 4, due to COVID-19- did not have patient remove face mask to test cranial nerves.  Motor/sensory grossly intact  Psych: Normal behavior, normal affect      Lab review:  Labs are reviewed and discussed with a patient  Lab Results   Component Value Date/Time    CHOLSTRLTOT 180 08/03/2020 09:46 AM     (H) 08/03/2020 09:46 AM    HDL 47 08/03/2020 09:46 AM    TRIGLYCERIDE 102 08/03/2020 09:46 AM       Lab Results   Component Value Date/Time    SODIUM 143 04/23/2021 04:13 AM    POTASSIUM 3.7 04/23/2021 04:13 AM    CHLORIDE 110 04/23/2021 04:13 AM    CO2 27 04/23/2021 04:13 AM    GLUCOSE 90 04/23/2021 04:13 AM    BUN 21 04/23/2021 04:13 AM    CREATININE 0.90 04/23/2021 04:13 AM    CREATININE 1.1 09/17/2008 04:10 AM     Lab Results   Component Value Date/Time    ALKPHOSPHAT 107 (H) 05/10/2021 08:24 AM    ASTSGOT 22 05/10/2021 08:24 AM    ALTSGPT 26 05/10/2021 08:24 AM    TBILIRUBIN 0.8 05/10/2021 08:24 AM      No results found for: HBA1C  No results " found for: TSH  Lab Results   Component Value Date/Time    FREET4 0.90 07/20/2016 08:31 AM       Lab Results   Component Value Date/Time    WBC 4.0 (L) 04/22/2021 03:02 AM    RBC 3.66 (L) 04/22/2021 03:02 AM    HEMOGLOBIN 12.0 (L) 04/22/2021 03:02 AM    HEMATOCRIT 35.7 (L) 04/22/2021 03:02 AM    MCV 97.5 04/22/2021 03:02 AM    MCH 32.8 04/22/2021 03:02 AM    MCHC 33.6 (L) 04/22/2021 03:02 AM    MPV 9.9 04/22/2021 03:02 AM    NEUTSPOLYS 62.40 04/22/2021 03:02 AM    LYMPHOCYTES 16.40 (L) 04/22/2021 03:02 AM    MONOCYTES 14.10 (H) 04/22/2021 03:02 AM    EOSINOPHILS 6.10 04/22/2021 03:02 AM    BASOPHILS 1.00 04/22/2021 03:02 AM          Assessment/Plan:  1. Painless jaundice  CA 19-9 repeat was normal.  No recurrent jaundice since his hospitalization.  Patient feeling quite well.  2. Anemia, unspecified type  - CBC WITH DIFFERENTIAL; Future  Repeat CBC now, history of anemia while in hospital.  3. Elevated LFTs  - Comp Metabolic Panel; Future  LFTs improved while in the hospital, due for repeat to look for resolution.  Patient denies any abdominal pain.  No recurrent jaundice since hospitalization.  4. Renal insufficiency  - Comp Metabolic Panel; Future  GFR were slightly low when he was in the hospital, will repeat now.  5. Screening for cardiovascular condition  - Lipid Profile; Future    6. Dyslipidemia  - Lipid Profile; Future    7. History of prostate cancer  Follow-up with urology as scheduled.  8. Need for vaccination  - Zoster Vac Recomb Adjuvanted (SHINGRIX) 50 MCG/0.5ML Recon Susp; Inject 0.5 mL into the shoulder, thigh, or buttocks one time for 1 dose.  Dispense: 0.5 mL; Refill: 0  9. POLST  POLST reviewed with patient in detail today, answered all questions and signed.       Follow-up: Return in about 6 months (around 1/28/2022) for Follow up.    Please note that this dictation was created using voice recognition software. I have made every reasonable attempt to correct obvious errors, but I expect that  there are errors of grammar and possibly content that I did not discover before finalizing the note.

## 2021-07-28 NOTE — ASSESSMENT & PLAN NOTE
Patient's last visit was a hospital discharge follow-up visit.  Patient had an elevated CA 19-9 and was admitted and worked up for painless jaundice.  Repeat CA 19-9 May 11, 2021 came back normal at 28.  Reassuring.

## 2021-07-29 ENCOUNTER — HOSPITAL ENCOUNTER (OUTPATIENT)
Dept: LAB | Facility: MEDICAL CENTER | Age: 86
End: 2021-07-29
Attending: PHYSICIAN ASSISTANT
Payer: MEDICARE

## 2021-07-29 DIAGNOSIS — R79.89 ELEVATED LFTS: ICD-10-CM

## 2021-07-29 DIAGNOSIS — Z13.6 SCREENING FOR CARDIOVASCULAR CONDITION: ICD-10-CM

## 2021-07-29 DIAGNOSIS — D64.9 ANEMIA, UNSPECIFIED TYPE: ICD-10-CM

## 2021-07-29 DIAGNOSIS — N28.9 RENAL INSUFFICIENCY: ICD-10-CM

## 2021-07-29 DIAGNOSIS — E78.5 DYSLIPIDEMIA: ICD-10-CM

## 2021-07-29 LAB
ALBUMIN SERPL BCP-MCNC: 4 G/DL (ref 3.2–4.9)
ALBUMIN/GLOB SERPL: 1.8 G/DL
ALP SERPL-CCNC: 89 U/L (ref 30–99)
ALT SERPL-CCNC: 12 U/L (ref 2–50)
ANION GAP SERPL CALC-SCNC: 7 MMOL/L (ref 7–16)
AST SERPL-CCNC: 18 U/L (ref 12–45)
BASOPHILS # BLD AUTO: 1 % (ref 0–1.8)
BASOPHILS # BLD: 0.05 K/UL (ref 0–0.12)
BILIRUB SERPL-MCNC: 0.7 MG/DL (ref 0.1–1.5)
BUN SERPL-MCNC: 14 MG/DL (ref 8–22)
CALCIUM SERPL-MCNC: 9.2 MG/DL (ref 8.5–10.5)
CHLORIDE SERPL-SCNC: 108 MMOL/L (ref 96–112)
CHOLEST SERPL-MCNC: 157 MG/DL (ref 100–199)
CO2 SERPL-SCNC: 27 MMOL/L (ref 20–33)
CREAT SERPL-MCNC: 1.12 MG/DL (ref 0.5–1.4)
EOSINOPHIL # BLD AUTO: 0.33 K/UL (ref 0–0.51)
EOSINOPHIL NFR BLD: 6.5 % (ref 0–6.9)
ERYTHROCYTE [DISTWIDTH] IN BLOOD BY AUTOMATED COUNT: 48.5 FL (ref 35.9–50)
FASTING STATUS PATIENT QL REPORTED: NORMAL
GLOBULIN SER CALC-MCNC: 2.2 G/DL (ref 1.9–3.5)
GLUCOSE SERPL-MCNC: 92 MG/DL (ref 65–99)
HCT VFR BLD AUTO: 38.4 % (ref 42–52)
HDLC SERPL-MCNC: 53 MG/DL
HGB BLD-MCNC: 12.8 G/DL (ref 14–18)
IMM GRANULOCYTES # BLD AUTO: 0.02 K/UL (ref 0–0.11)
IMM GRANULOCYTES NFR BLD AUTO: 0.4 % (ref 0–0.9)
LDLC SERPL CALC-MCNC: 89 MG/DL
LYMPHOCYTES # BLD AUTO: 0.93 K/UL (ref 1–4.8)
LYMPHOCYTES NFR BLD: 18.5 % (ref 22–41)
MCH RBC QN AUTO: 32.7 PG (ref 27–33)
MCHC RBC AUTO-ENTMCNC: 33.3 G/DL (ref 33.7–35.3)
MCV RBC AUTO: 98 FL (ref 81.4–97.8)
MONOCYTES # BLD AUTO: 0.59 K/UL (ref 0–0.85)
MONOCYTES NFR BLD AUTO: 11.7 % (ref 0–13.4)
NEUTROPHILS # BLD AUTO: 3.12 K/UL (ref 1.82–7.42)
NEUTROPHILS NFR BLD: 61.9 % (ref 44–72)
NRBC # BLD AUTO: 0 K/UL
NRBC BLD-RTO: 0 /100 WBC
PLATELET # BLD AUTO: 167 K/UL (ref 164–446)
PMV BLD AUTO: 10 FL (ref 9–12.9)
POTASSIUM SERPL-SCNC: 3.8 MMOL/L (ref 3.6–5.5)
PROT SERPL-MCNC: 6.2 G/DL (ref 6–8.2)
RBC # BLD AUTO: 3.92 M/UL (ref 4.7–6.1)
SODIUM SERPL-SCNC: 142 MMOL/L (ref 135–145)
TRIGL SERPL-MCNC: 76 MG/DL (ref 0–149)
WBC # BLD AUTO: 5 K/UL (ref 4.8–10.8)

## 2021-07-29 PROCEDURE — 85025 COMPLETE CBC W/AUTO DIFF WBC: CPT

## 2021-07-29 PROCEDURE — 36415 COLL VENOUS BLD VENIPUNCTURE: CPT

## 2021-07-29 PROCEDURE — 80053 COMPREHEN METABOLIC PANEL: CPT

## 2021-07-29 PROCEDURE — 80061 LIPID PANEL: CPT

## 2021-07-30 ENCOUNTER — TELEPHONE (OUTPATIENT)
Dept: MEDICAL GROUP | Facility: PHYSICIAN GROUP | Age: 86
End: 2021-07-30

## 2021-07-30 NOTE — TELEPHONE ENCOUNTER
----- Message from Leandra Hayes P.A.-C. sent at 7/30/2021 12:26 PM PDT -----  Please call patient about their results.     Results showed: Labs overall look stable, patient still has slight anemia, but improved compared to previous.  We will continue to monitor.  Cholesterol, kidney function, electrolytes all look good.    Thank you,    Leandra Hayes PA-C

## 2021-09-29 ENCOUNTER — HOSPITAL ENCOUNTER (OUTPATIENT)
Dept: LAB | Facility: MEDICAL CENTER | Age: 86
End: 2021-09-29
Attending: PHYSICIAN ASSISTANT
Payer: MEDICARE

## 2021-09-29 LAB — PSA SERPL-MCNC: 3.27 NG/ML (ref 0–4)

## 2021-09-29 PROCEDURE — 84153 ASSAY OF PSA TOTAL: CPT

## 2021-09-29 PROCEDURE — 36415 COLL VENOUS BLD VENIPUNCTURE: CPT

## 2021-11-10 ENCOUNTER — HOSPITAL ENCOUNTER (OUTPATIENT)
Dept: LAB | Facility: MEDICAL CENTER | Age: 86
End: 2021-11-10
Attending: PHYSICIAN ASSISTANT
Payer: MEDICARE

## 2021-11-10 LAB — TESTOST SERPL-MCNC: 681 NG/DL (ref 175–781)

## 2021-11-10 PROCEDURE — 36415 COLL VENOUS BLD VENIPUNCTURE: CPT

## 2021-11-10 PROCEDURE — 84403 ASSAY OF TOTAL TESTOSTERONE: CPT

## 2021-11-16 ENCOUNTER — HOSPITAL ENCOUNTER (OUTPATIENT)
Dept: LAB | Facility: MEDICAL CENTER | Age: 86
End: 2021-11-16
Attending: PHYSICIAN ASSISTANT
Payer: MEDICARE

## 2021-11-16 LAB
ANION GAP SERPL CALC-SCNC: 10 MMOL/L (ref 7–16)
BUN SERPL-MCNC: 16 MG/DL (ref 8–22)
CALCIUM SERPL-MCNC: 9.2 MG/DL (ref 8.5–10.5)
CHLORIDE SERPL-SCNC: 109 MMOL/L (ref 96–112)
CO2 SERPL-SCNC: 26 MMOL/L (ref 20–33)
CREAT SERPL-MCNC: 0.97 MG/DL (ref 0.5–1.4)
GLUCOSE SERPL-MCNC: 92 MG/DL (ref 65–99)
POTASSIUM SERPL-SCNC: 4.2 MMOL/L (ref 3.6–5.5)
SODIUM SERPL-SCNC: 145 MMOL/L (ref 135–145)

## 2021-11-16 PROCEDURE — 36415 COLL VENOUS BLD VENIPUNCTURE: CPT

## 2021-11-16 PROCEDURE — 80048 BASIC METABOLIC PNL TOTAL CA: CPT

## 2021-11-18 ENCOUNTER — HOSPITAL ENCOUNTER (OUTPATIENT)
Dept: RADIOLOGY | Facility: MEDICAL CENTER | Age: 86
End: 2021-11-18
Attending: PHYSICIAN ASSISTANT
Payer: MEDICARE

## 2021-11-18 DIAGNOSIS — R97.21 INCREASING PSA LEVEL AFTER TREATMENT FOR PROSTATE CANCER: ICD-10-CM

## 2021-11-18 DIAGNOSIS — R97.21 RISING PSA FOLLOWING TREATMENT FOR MALIGNANT NEOPLASM OF PROSTATE: ICD-10-CM

## 2021-11-18 PROCEDURE — A9503 TC99M MEDRONATE: HCPCS

## 2021-11-18 PROCEDURE — 74177 CT ABD & PELVIS W/CONTRAST: CPT | Mod: MH

## 2021-11-18 PROCEDURE — 700117 HCHG RX CONTRAST REV CODE 255: Performed by: PHYSICIAN ASSISTANT

## 2021-11-18 RX ADMIN — IOHEXOL 100 ML: 350 INJECTION, SOLUTION INTRAVENOUS at 14:32

## 2021-11-18 RX ADMIN — IOHEXOL 25 ML: 240 INJECTION, SOLUTION INTRATHECAL; INTRAVASCULAR; INTRAVENOUS; ORAL at 14:20

## 2021-12-01 ENCOUNTER — HOSPITAL ENCOUNTER (EMERGENCY)
Facility: MEDICAL CENTER | Age: 86
End: 2021-12-01
Attending: EMERGENCY MEDICINE
Payer: MEDICARE

## 2021-12-01 ENCOUNTER — APPOINTMENT (OUTPATIENT)
Dept: RADIOLOGY | Facility: MEDICAL CENTER | Age: 86
End: 2021-12-01
Attending: EMERGENCY MEDICINE
Payer: MEDICARE

## 2021-12-01 VITALS
OXYGEN SATURATION: 99 % | HEIGHT: 62 IN | TEMPERATURE: 99.4 F | RESPIRATION RATE: 22 BRPM | WEIGHT: 127 LBS | DIASTOLIC BLOOD PRESSURE: 73 MMHG | HEART RATE: 88 BPM | BODY MASS INDEX: 23.37 KG/M2 | SYSTOLIC BLOOD PRESSURE: 169 MMHG

## 2021-12-01 DIAGNOSIS — R10.11 RIGHT UPPER QUADRANT ABDOMINAL PAIN: ICD-10-CM

## 2021-12-01 DIAGNOSIS — R11.2 NAUSEA AND VOMITING, INTRACTABILITY OF VOMITING NOT SPECIFIED, UNSPECIFIED VOMITING TYPE: ICD-10-CM

## 2021-12-01 LAB
ALBUMIN SERPL BCP-MCNC: 4.1 G/DL (ref 3.2–4.9)
ALBUMIN/GLOB SERPL: 2.1 G/DL
ALP SERPL-CCNC: 595 U/L (ref 30–99)
ALT SERPL-CCNC: 61 U/L (ref 2–50)
ANION GAP SERPL CALC-SCNC: 10 MMOL/L (ref 7–16)
APPEARANCE UR: CLEAR
AST SERPL-CCNC: 60 U/L (ref 12–45)
BACTERIA #/AREA URNS HPF: NEGATIVE /HPF
BASOPHILS # BLD AUTO: 0.6 % (ref 0–1.8)
BASOPHILS # BLD: 0.05 K/UL (ref 0–0.12)
BILIRUB SERPL-MCNC: 3 MG/DL (ref 0.1–1.5)
BILIRUB UR QL STRIP.AUTO: ABNORMAL
BUN SERPL-MCNC: 20 MG/DL (ref 8–22)
CALCIUM SERPL-MCNC: 9.3 MG/DL (ref 8.5–10.5)
CHLORIDE SERPL-SCNC: 107 MMOL/L (ref 96–112)
CO2 SERPL-SCNC: 25 MMOL/L (ref 20–33)
COLOR UR: ABNORMAL
CREAT SERPL-MCNC: 0.96 MG/DL (ref 0.5–1.4)
EKG IMPRESSION: NORMAL
EOSINOPHIL # BLD AUTO: 0.09 K/UL (ref 0–0.51)
EOSINOPHIL NFR BLD: 1.1 % (ref 0–6.9)
EPI CELLS #/AREA URNS HPF: NEGATIVE /HPF
ERYTHROCYTE [DISTWIDTH] IN BLOOD BY AUTOMATED COUNT: 51.7 FL (ref 35.9–50)
GLOBULIN SER CALC-MCNC: 2 G/DL (ref 1.9–3.5)
GLUCOSE SERPL-MCNC: 133 MG/DL (ref 65–99)
GLUCOSE UR STRIP.AUTO-MCNC: NEGATIVE MG/DL
HCT VFR BLD AUTO: 38.9 % (ref 42–52)
HGB BLD-MCNC: 12.9 G/DL (ref 14–18)
HYALINE CASTS #/AREA URNS LPF: ABNORMAL /LPF
IMM GRANULOCYTES # BLD AUTO: 0.05 K/UL (ref 0–0.11)
IMM GRANULOCYTES NFR BLD AUTO: 0.6 % (ref 0–0.9)
KETONES UR STRIP.AUTO-MCNC: ABNORMAL MG/DL
LEUKOCYTE ESTERASE UR QL STRIP.AUTO: ABNORMAL
LIPASE SERPL-CCNC: 87 U/L (ref 11–82)
LYMPHOCYTES # BLD AUTO: 0.77 K/UL (ref 1–4.8)
LYMPHOCYTES NFR BLD: 9.6 % (ref 22–41)
MCH RBC QN AUTO: 32.1 PG (ref 27–33)
MCHC RBC AUTO-ENTMCNC: 33.2 G/DL (ref 33.7–35.3)
MCV RBC AUTO: 96.8 FL (ref 81.4–97.8)
MICRO URNS: ABNORMAL
MONOCYTES # BLD AUTO: 0.66 K/UL (ref 0–0.85)
MONOCYTES NFR BLD AUTO: 8.2 % (ref 0–13.4)
NEUTROPHILS # BLD AUTO: 6.41 K/UL (ref 1.82–7.42)
NEUTROPHILS NFR BLD: 79.9 % (ref 44–72)
NITRITE UR QL STRIP.AUTO: NEGATIVE
NRBC # BLD AUTO: 0 K/UL
NRBC BLD-RTO: 0 /100 WBC
PH UR STRIP.AUTO: 5 [PH] (ref 5–8)
PLATELET # BLD AUTO: 253 K/UL (ref 164–446)
PMV BLD AUTO: 10.1 FL (ref 9–12.9)
POTASSIUM SERPL-SCNC: 4 MMOL/L (ref 3.6–5.5)
PROT SERPL-MCNC: 6.1 G/DL (ref 6–8.2)
PROT UR QL STRIP: 30 MG/DL
RBC # BLD AUTO: 4.02 M/UL (ref 4.7–6.1)
RBC # URNS HPF: ABNORMAL /HPF
RBC UR QL AUTO: NEGATIVE
SODIUM SERPL-SCNC: 142 MMOL/L (ref 135–145)
SP GR UR STRIP.AUTO: 1.02
TROPONIN T SERPL-MCNC: 34 NG/L (ref 6–19)
UROBILINOGEN UR STRIP.AUTO-MCNC: 1 MG/DL
WBC # BLD AUTO: 8 K/UL (ref 4.8–10.8)
WBC #/AREA URNS HPF: ABNORMAL /HPF

## 2021-12-01 PROCEDURE — 74177 CT ABD & PELVIS W/CONTRAST: CPT | Mod: ME

## 2021-12-01 PROCEDURE — 99285 EMERGENCY DEPT VISIT HI MDM: CPT

## 2021-12-01 PROCEDURE — 83690 ASSAY OF LIPASE: CPT

## 2021-12-01 PROCEDURE — 85025 COMPLETE CBC W/AUTO DIFF WBC: CPT

## 2021-12-01 PROCEDURE — 93005 ELECTROCARDIOGRAM TRACING: CPT | Performed by: EMERGENCY MEDICINE

## 2021-12-01 PROCEDURE — 81001 URINALYSIS AUTO W/SCOPE: CPT

## 2021-12-01 PROCEDURE — 700111 HCHG RX REV CODE 636 W/ 250 OVERRIDE (IP): Performed by: EMERGENCY MEDICINE

## 2021-12-01 PROCEDURE — 96375 TX/PRO/DX INJ NEW DRUG ADDON: CPT

## 2021-12-01 PROCEDURE — 700117 HCHG RX CONTRAST REV CODE 255: Performed by: EMERGENCY MEDICINE

## 2021-12-01 PROCEDURE — 80053 COMPREHEN METABOLIC PANEL: CPT

## 2021-12-01 PROCEDURE — 76705 ECHO EXAM OF ABDOMEN: CPT

## 2021-12-01 PROCEDURE — 84484 ASSAY OF TROPONIN QUANT: CPT

## 2021-12-01 PROCEDURE — 96374 THER/PROPH/DIAG INJ IV PUSH: CPT | Mod: XU

## 2021-12-01 RX ORDER — ONDANSETRON 2 MG/ML
4 INJECTION INTRAMUSCULAR; INTRAVENOUS ONCE
Status: COMPLETED | OUTPATIENT
Start: 2021-12-01 | End: 2021-12-01

## 2021-12-01 RX ORDER — ONDANSETRON 4 MG/1
4 TABLET, ORALLY DISINTEGRATING ORAL EVERY 6 HOURS PRN
Qty: 10 TABLET | Refills: 0 | Status: SHIPPED | OUTPATIENT
Start: 2021-12-01 | End: 2022-01-20

## 2021-12-01 RX ORDER — MORPHINE SULFATE 4 MG/ML
4 INJECTION INTRAVENOUS ONCE
Status: COMPLETED | OUTPATIENT
Start: 2021-12-01 | End: 2021-12-01

## 2021-12-01 RX ADMIN — IOHEXOL 80 ML: 350 INJECTION, SOLUTION INTRAVENOUS at 19:35

## 2021-12-01 RX ADMIN — ONDANSETRON 4 MG: 2 INJECTION INTRAMUSCULAR; INTRAVENOUS at 18:28

## 2021-12-01 RX ADMIN — MORPHINE SULFATE 4 MG: 4 INJECTION INTRAVENOUS at 18:54

## 2021-12-01 ASSESSMENT — FIBROSIS 4 INDEX: FIB4 SCORE: 2.77

## 2021-12-01 ASSESSMENT — LIFESTYLE VARIABLES
CONSUMPTION TOTAL: NEGATIVE
TOTAL SCORE: 0
EVER FELT BAD OR GUILTY ABOUT YOUR DRINKING: NO
AVERAGE NUMBER OF DAYS PER WEEK YOU HAVE A DRINK CONTAINING ALCOHOL: 0
EVER HAD A DRINK FIRST THING IN THE MORNING TO STEADY YOUR NERVES TO GET RID OF A HANGOVER: NO
ON A TYPICAL DAY WHEN YOU DRINK ALCOHOL HOW MANY DRINKS DO YOU HAVE: 0
DO YOU DRINK ALCOHOL: NO
HOW MANY TIMES IN THE PAST YEAR HAVE YOU HAD 5 OR MORE DRINKS IN A DAY: 0
HAVE PEOPLE ANNOYED YOU BY CRITICIZING YOUR DRINKING: NO
HAVE YOU EVER FELT YOU SHOULD CUT DOWN ON YOUR DRINKING: NO
TOTAL SCORE: 0
TOTAL SCORE: 0

## 2021-12-01 NOTE — ED TRIAGE NOTES
Chico Cedillo  89 y.o. male  Chief Complaint   Patient presents with   • Abdominal Pain     Ct scan done 2 weeks ago which showed gallstones; pt presents to ED for worsening abdominal pain, centrally located and also stating they have back pain; cant be seen by GI till Feb 2022.        Vitals:    12/01/21 1437   BP: 117/59   Pulse: 66   Resp: 16   Temp: 36.6 °C (97.8 °F)   SpO2: 96%        Patient ambulatory into triage for the complaint above of abdominal pain - pt in triage has reports of CT scan that was done by [facility], which showed gallstones. Pt called to GI for appointment but was unable to get in till Feb 2022. Pt presents to ED today for worsening pain that started at roughly 11am. Denies taking anything for pain control. Pt also has back pain - really unable to relate if it is from the abdominal pain or the known compression fracture of back. Pt rating pain at 7/10.    Patient is in stable condition at time of triage, has been educated on the triage process and placed back into the ED lobby at this time - pt has verbalized understanding of this process.     RN encouraged patient to alert staff at front for any changes that may occur while they are waiting to be evaluated by an ERP.     Of note, this RN and patient are in proper PPE and has a mask in place at all times during this encounter.     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 Samuel   • Urinary incontinence 01/27/2021

## 2021-12-01 NOTE — ED PROVIDER NOTES
ED Provider Note    Scribed for Logan Agosto by Amrik Harding. 12/1/2021, 3:56 PM.    Primary care provider: Leandra Hayes P.A.-C.  Means of arrival: Walk in  History obtained from: Patient  History limited by: None    CHIEF COMPLAINT  Chief Complaint   Patient presents with   • Abdominal Pain     Ct scan done 2 weeks ago which showed gallstones; pt presents to ED for worsening abdominal pain, centrally located and also stating they have back pain; cant be seen by GI till Feb 2022.      HPI  Chico Cedillo is a 89 y.o. male who presents to the Emergency Department for evaluation of moderate umbilical abdominal pain onset 11:00 AM today. He says pain is improved ay this time. Daughter reports he was evaluated with a CT by [facility] about 2 weeks ago, which revealed cholelithiasis. He says he was referred to GI, but cannot get an appointment until February, 2022. He admits to additional symptoms of mid-back pain (history of compression fracture 2 years ago), but denies vomiting, diarrhea, dysuria, chest pain, or shortness of breath. No alleviating factors were reported. He has history of a pacemaker and prostate cancer. He does not take any daily medications. He denies smoking tobacco or alcohol abuse. He is vaccinated for COVID.     REVIEW OF SYSTEMS  As above, otherwise all other systems are negative    PAST MEDICAL HISTORY   has a past medical history of Cancer (HCC) (2011), Cataract (01/27/2021), Diarrhea, Pacemaker, and Urinary incontinence (01/27/2021).    SURGICAL HISTORY   has a past surgical history that includes pacemaker insertion; other; tonsillectomy; and cataract phaco with iol (Right, 2/10/2021).    SOCIAL HISTORY  Social History     Tobacco Use   • Smoking status: Never Smoker   • Smokeless tobacco: Never Used   Vaping Use   • Vaping Use: Never used   Substance Use Topics   • Alcohol use: No     Alcohol/week: 0.0 oz   • Drug use: No      Social History     Substance and Sexual Activity  "  Drug Use No       FAMILY HISTORY  Family History   Problem Relation Age of Onset   • Heart Disease Father        CURRENT MEDICATIONS  Home Medications     Reviewed by Mario Herrera R.N. (Registered Nurse) on 12/01/21 at 1510  Med List Status: Not Addressed   Medication Last Dose Status        Patient Moe Taking any Medications                     ALLERGIES  No Known Allergies    PHYSICAL EXAM  VITAL SIGNS: /59   Pulse 66   Temp 36.6 °C (97.8 °F) (Temporal)   Resp 16   Ht 1.575 m (5' 2\")   Wt 57.6 kg (127 lb)   SpO2 96%   BMI 23.23 kg/m²     Constitutional: Well developed, Well nourished, No acute distress, Non-toxic appearance.   HENT: Normocephalic, Atraumatic, Bilateral external ears normal, oropharynx moist, No oral exudates, Nose normal.   Eyes:conjunctiva is normal, there are no signs of exudate.   Neck: Supple, no meningeal signs.  Lymphatic: No lymphadenopathy noted.   Cardiovascular: Regular rate and rhythm without murmurs gallops or rubs.   Thorax & Lungs: No respiratory distress. Breathing comfortably. Lungs are clear to auscultation bilaterally, there are no wheezes no rales. Chest wall is nontender.  Abdomen: No significant abdominal tenderness, rebound or guarding. Soft, nondistended. Bowel sounds are present.   Skin: Warm, Dry, No erythema,   Back: Mild paraspinal thoracic tenderness to palpation. No CVA tenderness.  Musculoskeletal: Good range of motion in all major joints. No tenderness to palpation or major deformities noted. Intact distal pulses, no clubbing, no cyanosis, no edema,   Neurologic: Alert & oriented x 3, Moving all extremities. No gross abnormalities.    Psychiatric: Affect normal, Judgment normal, Mood normal.     LABS  Results for orders placed or performed during the hospital encounter of 12/01/21   CBC WITH DIFFERENTIAL   Result Value Ref Range    WBC 8.0 4.8 - 10.8 K/uL    RBC 4.02 (L) 4.70 - 6.10 M/uL    Hemoglobin 12.9 (L) 14.0 - 18.0 g/dL    Hematocrit 38.9 " (L) 42.0 - 52.0 %    MCV 96.8 81.4 - 97.8 fL    MCH 32.1 27.0 - 33.0 pg    MCHC 33.2 (L) 33.7 - 35.3 g/dL    RDW 51.7 (H) 35.9 - 50.0 fL    Platelet Count 253 164 - 446 K/uL    MPV 10.1 9.0 - 12.9 fL    Neutrophils-Polys 79.90 (H) 44.00 - 72.00 %    Lymphocytes 9.60 (L) 22.00 - 41.00 %    Monocytes 8.20 0.00 - 13.40 %    Eosinophils 1.10 0.00 - 6.90 %    Basophils 0.60 0.00 - 1.80 %    Immature Granulocytes 0.60 0.00 - 0.90 %    Nucleated RBC 0.00 /100 WBC    Neutrophils (Absolute) 6.41 1.82 - 7.42 K/uL    Lymphs (Absolute) 0.77 (L) 1.00 - 4.80 K/uL    Monos (Absolute) 0.66 0.00 - 0.85 K/uL    Eos (Absolute) 0.09 0.00 - 0.51 K/uL    Baso (Absolute) 0.05 0.00 - 0.12 K/uL    Immature Granulocytes (abs) 0.05 0.00 - 0.11 K/uL    NRBC (Absolute) 0.00 K/uL   COMP METABOLIC PANEL   Result Value Ref Range    Sodium 142 135 - 145 mmol/L    Potassium 4.0 3.6 - 5.5 mmol/L    Chloride 107 96 - 112 mmol/L    Co2 25 20 - 33 mmol/L    Anion Gap 10.0 7.0 - 16.0    Glucose 133 (H) 65 - 99 mg/dL    Bun 20 8 - 22 mg/dL    Creatinine 0.96 0.50 - 1.40 mg/dL    Calcium 9.3 8.5 - 10.5 mg/dL    AST(SGOT) 60 (H) 12 - 45 U/L    ALT(SGPT) 61 (H) 2 - 50 U/L    Alkaline Phosphatase 595 (H) 30 - 99 U/L    Total Bilirubin 3.0 (H) 0.1 - 1.5 mg/dL    Albumin 4.1 3.2 - 4.9 g/dL    Total Protein 6.1 6.0 - 8.2 g/dL    Globulin 2.0 1.9 - 3.5 g/dL    A-G Ratio 2.1 g/dL   LIPASE   Result Value Ref Range    Lipase 87 (H) 11 - 82 U/L   URINALYSIS    Specimen: Urine   Result Value Ref Range    Color DK Yellow     Character Clear     Specific Gravity 1.023 <1.035    Ph 5.0 5.0 - 8.0    Glucose Negative Negative mg/dL    Ketones Trace (A) Negative mg/dL    Protein 30 (A) Negative mg/dL    Bilirubin Moderate (A) Negative    Urobilinogen, Urine 1.0 Negative    Nitrite Negative Negative    Leukocyte Esterase Trace (A) Negative    Occult Blood Negative Negative    Micro Urine Req Microscopic    URINE MICROSCOPIC (W/UA)   Result Value Ref Range    WBC 0-2 (A) /hpf     RBC 2-5 (A) /hpf    Bacteria Negative None /hpf    Epithelial Cells Negative /hpf    Hyaline Cast 3-5 (A) /lpf   TROPONIN   Result Value Ref Range    Troponin T 34 (H) 6 - 19 ng/L   ESTIMATED GFR   Result Value Ref Range    GFR If African American >60 >60 mL/min/1.73 m 2    GFR If Non African American >60 >60 mL/min/1.73 m 2   EKG   Result Value Ref Range    Report       [facility] Emergency Dept.    Test Date:  2021  Pt Name:    JAEL BENJAMIN                   Department: ER  MRN:        [id number]                      Room:       [facility]  Gender:     Male                         Technician: [id number]  :        [id number]                   Requested By:LEBRON CHRISTENSEN  Order #:    [id number]                    Reading MD:    Measurements  Intervals                                Axis  Rate:       66                           P:          96  MN:         248                          QRS:        69  QRSD:       120                          T:          -88  QT:         467  QTc:        490    Interpretive Statements  A-V dual-paced complexes w/ some inhibition  No further analysis attempted due to paced rhythm  Baseline wander in lead(s) V4  Compared to ECG 2008 10:47:08  Sinus rhythm no longer present  First degree AV block no longer present  Left bundle-branch block no longer present        All labs reviewed by me.    EKG Interpretation:  Interpreted by me    Rhythm:  Normal sinus rhythm   EKG interpreted at 4: 19 p.m. shows AV dual paced rhythm of 66, left axis and left bundle consistent with paced rhythm, no ischemic appearing ST elevation or reciprocal changes    RADIOLOGY  CT-ABDOMEN-PELVIS WITH   Final Result      1.  Cholelithiasis with intra and extrahepatic hepatic biliary duct dilatation with evidence of distal common bile duct stones.      2.  Diverticulosis without evidence of diverticulitis.      US-RUQ   Final Result      1.  Intra and extra hepatic biliary duct dilatation.       2.  Gallstones identified on previous ultrasound not appreciated on today's ultrasound.      3.  Aortic atherosclerosis.        The radiologist's interpretation of all radiological studies have been reviewed by me.    COURSE & MEDICAL DECISION MAKING  Pertinent Labs & Imaging studies reviewed. (See chart for details)    3:56 PM - Patient seen and examined at bedside. I informed the patient of my plan to run diagnostic studies to evaluate their symptoms including imaging and labs. Patient verbalizes understanding and support with my plan of care. Ordered US-RUQ, EKG, CBC with diff, CMP, Lipase, UA, Troponin to evaluate his symptoms. The differential diagnoses include but are not limited to: Cholelithiasis, Cholecystitis, Kidney Stone     6pm - I reevaluated the patient at bedside. I discussed the patient's diagnostic study results as shown above. I discussed plan for discharge and follow up as outlined below. The patient verbalizes they feel comfortable going home. The patient is stable for discharge at this time and will return for any new or worsening symptoms. Patient verbalizes understanding and support with my plan for discharge.      Decision Makin-year-old male who is remarkably healthy presenting for brief episode of epigastric and right upper quadrant abdominal pain that resolved earlier today.  His vital signs are normal and reassuring.  He has benign physical exam with no abdominal tenderness.  He is asymptomatic here.  There is report that he has a history of gallstones so right upper quadrant ultrasound was done which showed no significant or emergent abnormalities in his labs including CBC, CMP, lipase and troponin as well as EKG were unremarkable.  CT imaging was done as patient did develop some nausea and vomiting that was treated with Zofran with success.  CT imaging was was done which was negative for any acute findings of the abdomen.  Possible gastroenteritis.  Will Rx for Zofran.   Discussed findings in depth with patient and daughter and they are asking for discharge interested in any further work-up at this time.  I discussed CT imaging results, ultrasound and labs.  The patient will return for new or worsening symptoms and is stable at the time of discharge.    DISPOSITION:  Patient will be discharged home in stable condition.    FOLLOW UP:  Leandra Hayes P.A.-C.  [address] [facility] [id number] [phone]      As needed, If symptoms worsen      OUTPATIENT MEDICATIONS:  New Prescriptions    ONDANSETRON (ZOFRAN ODT) 4 MG TABLET DISPERSIBLE    Take 1 Tablet by mouth every 6 hours as needed for Nausea.       FINAL IMPRESSION  1. Right upper quadrant abdominal pain Acute   2. Nausea and vomiting, intractability of vomiting not specified, unspecified vomiting type        I, Amrik Harding (Scribe), am scribing for, and in the presence of, Logan Agosto.    Electronically signed by: Amrik Harding (Kyleibe), 12/1/2021    ILogan personally performed the services described in this documentation, as scribed by Amrik Harding in my presence, and it is both accurate and complete.    The note accurately reflects work and decisions made by me.  Logan Agosto  12/1/2021  6:01 PM

## 2021-12-02 NOTE — ED NOTES
Attempted to get pt into chair. Sleepy and not supporting on wait. ERP currently in another pt's room, will alert

## 2021-12-02 NOTE — DISCHARGE INSTRUCTIONS
As we discussed, if his pain does not improve in 24 hours or gets worse or develops fever, nausea or vomiting, I want him back in the emergency department immediately for further evaluation.  At this time his ultrasound and labs look reassuring and he has normal vital signs.  However, with his advanced age, I want to be careful and look out for any worsening signs or symptoms and bring back if there are changes.  Thank you for coming in today.

## 2021-12-02 NOTE — ED NOTES
Discharge instructions given to family regarding reasons to return to the ER. Verbalized understanding and signed AVS.  Daughter stated family will assist w/ getting pt into his home. Discharged from unit.

## 2021-12-07 ENCOUNTER — HOSPITAL ENCOUNTER (OUTPATIENT)
Dept: LAB | Facility: MEDICAL CENTER | Age: 86
End: 2021-12-07
Attending: UROLOGY
Payer: MEDICARE

## 2021-12-07 LAB — PSA SERPL-MCNC: 9.28 NG/ML (ref 0–4)

## 2021-12-07 PROCEDURE — 36415 COLL VENOUS BLD VENIPUNCTURE: CPT

## 2021-12-07 PROCEDURE — 84153 ASSAY OF PSA TOTAL: CPT

## 2022-01-03 ENCOUNTER — TELEPHONE (OUTPATIENT)
Dept: SCHEDULING | Facility: IMAGING CENTER | Age: 87
End: 2022-01-03

## 2022-01-20 ENCOUNTER — OFFICE VISIT (OUTPATIENT)
Dept: MEDICAL GROUP | Facility: PHYSICIAN GROUP | Age: 87
End: 2022-01-20
Payer: MEDICARE

## 2022-01-20 VITALS
BODY MASS INDEX: 23.92 KG/M2 | TEMPERATURE: 98.6 F | DIASTOLIC BLOOD PRESSURE: 62 MMHG | WEIGHT: 130 LBS | OXYGEN SATURATION: 97 % | SYSTOLIC BLOOD PRESSURE: 100 MMHG | HEART RATE: 63 BPM | RESPIRATION RATE: 14 BRPM | HEIGHT: 62 IN

## 2022-01-20 DIAGNOSIS — C61 PROSTATE CANCER (HCC): ICD-10-CM

## 2022-01-20 DIAGNOSIS — R41.3 MEMORY LOSS: ICD-10-CM

## 2022-01-20 DIAGNOSIS — Z13.0 SCREENING FOR DEFICIENCY ANEMIA: ICD-10-CM

## 2022-01-20 DIAGNOSIS — Z13.1 ENCOUNTER FOR SCREENING FOR DIABETES MELLITUS: ICD-10-CM

## 2022-01-20 DIAGNOSIS — K80.20 CALCULUS OF GALLBLADDER WITHOUT CHOLECYSTITIS WITHOUT OBSTRUCTION: ICD-10-CM

## 2022-01-20 DIAGNOSIS — Z13.6 SCREENING FOR CARDIOVASCULAR CONDITION: ICD-10-CM

## 2022-01-20 PROBLEM — N39.41 URGE INCONTINENCE OF URINE: Status: ACTIVE | Noted: 2021-12-20

## 2022-01-20 PROCEDURE — 99214 OFFICE O/P EST MOD 30 MIN: CPT | Performed by: INTERNAL MEDICINE

## 2022-01-20 RX ORDER — ACETAMINOPHEN 500 MG
TABLET ORAL
COMMUNITY
Start: 2021-12-01 | End: 2023-01-12

## 2022-01-20 SDOH — ECONOMIC STABILITY: TRANSPORTATION INSECURITY
IN THE PAST 12 MONTHS, HAS LACK OF RELIABLE TRANSPORTATION KEPT YOU FROM MEDICAL APPOINTMENTS, MEETINGS, WORK OR FROM GETTING THINGS NEEDED FOR DAILY LIVING?: NO

## 2022-01-20 SDOH — HEALTH STABILITY: MENTAL HEALTH
STRESS IS WHEN SOMEONE FEELS TENSE, NERVOUS, ANXIOUS, OR CAN'T SLEEP AT NIGHT BECAUSE THEIR MIND IS TROUBLED. HOW STRESSED ARE YOU?: NOT AT ALL

## 2022-01-20 SDOH — ECONOMIC STABILITY: HOUSING INSECURITY
IN THE LAST 12 MONTHS, WAS THERE A TIME WHEN YOU DID NOT HAVE A STEADY PLACE TO SLEEP OR SLEPT IN A SHELTER (INCLUDING NOW)?: NO

## 2022-01-20 SDOH — HEALTH STABILITY: PHYSICAL HEALTH: ON AVERAGE, HOW MANY DAYS PER WEEK DO YOU ENGAGE IN MODERATE TO STRENUOUS EXERCISE (LIKE A BRISK WALK)?: 6 DAYS

## 2022-01-20 SDOH — ECONOMIC STABILITY: TRANSPORTATION INSECURITY
IN THE PAST 12 MONTHS, HAS THE LACK OF TRANSPORTATION KEPT YOU FROM MEDICAL APPOINTMENTS OR FROM GETTING MEDICATIONS?: NO

## 2022-01-20 SDOH — ECONOMIC STABILITY: FOOD INSECURITY: WITHIN THE PAST 12 MONTHS, YOU WORRIED THAT YOUR FOOD WOULD RUN OUT BEFORE YOU GOT MONEY TO BUY MORE.: NEVER TRUE

## 2022-01-20 SDOH — ECONOMIC STABILITY: INCOME INSECURITY: IN THE LAST 12 MONTHS, WAS THERE A TIME WHEN YOU WERE NOT ABLE TO PAY THE MORTGAGE OR RENT ON TIME?: NO

## 2022-01-20 SDOH — ECONOMIC STABILITY: TRANSPORTATION INSECURITY
IN THE PAST 12 MONTHS, HAS LACK OF TRANSPORTATION KEPT YOU FROM MEETINGS, WORK, OR FROM GETTING THINGS NEEDED FOR DAILY LIVING?: NO

## 2022-01-20 SDOH — ECONOMIC STABILITY: FOOD INSECURITY: WITHIN THE PAST 12 MONTHS, THE FOOD YOU BOUGHT JUST DIDN'T LAST AND YOU DIDN'T HAVE MONEY TO GET MORE.: NEVER TRUE

## 2022-01-20 SDOH — HEALTH STABILITY: PHYSICAL HEALTH: ON AVERAGE, HOW MANY MINUTES DO YOU ENGAGE IN EXERCISE AT THIS LEVEL?: 10 MIN

## 2022-01-20 SDOH — ECONOMIC STABILITY: HOUSING INSECURITY: IN THE LAST 12 MONTHS, HOW MANY PLACES HAVE YOU LIVED?: 1

## 2022-01-20 SDOH — ECONOMIC STABILITY: INCOME INSECURITY: HOW HARD IS IT FOR YOU TO PAY FOR THE VERY BASICS LIKE FOOD, HOUSING, MEDICAL CARE, AND HEATING?: NOT HARD AT ALL

## 2022-01-20 ASSESSMENT — SOCIAL DETERMINANTS OF HEALTH (SDOH)
WITHIN THE PAST 12 MONTHS, YOU WORRIED THAT YOUR FOOD WOULD RUN OUT BEFORE YOU GOT THE MONEY TO BUY MORE: NEVER TRUE
HOW HARD IS IT FOR YOU TO PAY FOR THE VERY BASICS LIKE FOOD, HOUSING, MEDICAL CARE, AND HEATING?: NOT HARD AT ALL
DO YOU BELONG TO ANY CLUBS OR ORGANIZATIONS SUCH AS CHURCH GROUPS UNIONS, FRATERNAL OR ATHLETIC GROUPS, OR SCHOOL GROUPS?: NO
IN A TYPICAL WEEK, HOW MANY TIMES DO YOU TALK ON THE PHONE WITH FAMILY, FRIENDS, OR NEIGHBORS?: MORE THAN THREE TIMES A WEEK
HOW OFTEN DO YOU ATTEND CHURCH OR RELIGIOUS SERVICES?: NEVER
HOW OFTEN DO YOU GET TOGETHER WITH FRIENDS OR RELATIVES?: ONCE A WEEK
IN A TYPICAL WEEK, HOW MANY TIMES DO YOU TALK ON THE PHONE WITH FAMILY, FRIENDS, OR NEIGHBORS?: MORE THAN THREE TIMES A WEEK
DO YOU BELONG TO ANY CLUBS OR ORGANIZATIONS SUCH AS CHURCH GROUPS UNIONS, FRATERNAL OR ATHLETIC GROUPS, OR SCHOOL GROUPS?: NO
HOW OFTEN DO YOU GET TOGETHER WITH FRIENDS OR RELATIVES?: ONCE A WEEK
HOW MANY DRINKS CONTAINING ALCOHOL DO YOU HAVE ON A TYPICAL DAY WHEN YOU ARE DRINKING: PATIENT DECLINED
HOW OFTEN DO YOU ATTENT MEETINGS OF THE CLUB OR ORGANIZATION YOU BELONG TO?: NEVER
HOW OFTEN DO YOU ATTEND CHURCH OR RELIGIOUS SERVICES?: NEVER
HOW OFTEN DO YOU HAVE A DRINK CONTAINING ALCOHOL: NEVER
HOW OFTEN DO YOU HAVE SIX OR MORE DRINKS ON ONE OCCASION: NEVER
HOW OFTEN DO YOU ATTENT MEETINGS OF THE CLUB OR ORGANIZATION YOU BELONG TO?: NEVER

## 2022-01-20 ASSESSMENT — PATIENT HEALTH QUESTIONNAIRE - PHQ9: CLINICAL INTERPRETATION OF PHQ2 SCORE: 0

## 2022-01-20 ASSESSMENT — LIFESTYLE VARIABLES
HOW OFTEN DO YOU HAVE A DRINK CONTAINING ALCOHOL: NEVER
HOW OFTEN DO YOU HAVE SIX OR MORE DRINKS ON ONE OCCASION: NEVER
HOW MANY STANDARD DRINKS CONTAINING ALCOHOL DO YOU HAVE ON A TYPICAL DAY: PATIENT DECLINED

## 2022-01-20 ASSESSMENT — FIBROSIS 4 INDEX: FIB4 SCORE: 2.7

## 2022-01-20 NOTE — PROGRESS NOTES
Subjective:     CC: Establish care    HISTORY OF THE PRESENT ILLNESS: Patient is a 89 y.o. male. This pleasant patient is here today to establish care and discuss the following issues:    The patient is followed by [facility], Dr. Tejeda, for prostate cancer. He was diagnosed in 2012 and completed a course of radiation treatment. Patient's recent PSA on 12/7/2021 was 9.28, up from 3.27. He was started on Lupron every 3 months 12/2021. CT abdomen and pelvis on 11/18/2021 showed no evidence of metastatic disease. Nuclear bone scan on 11/18/2021 showed no new suspicious activity.    The patient was seen in the ED on 12/1/2021 for acute abdominal pain. Right upper quadrant ultrasound showed gallstones with intra and extrahepatic biliary duct dilation. CT abdomen and pelvis showed cholelithiasis with intra and extrahepatic biliary dilation. The patient currently denies abdominal pain. The patient is scheduled to [facility].    The patient had his geriatric wellness assessment on 11/1/2021. He scored poorly on the mini cog screening assessment, and was recommended to have further evaluation. He was unable to recall 3 words and Galdo clock properly. The patient's daughter does report that the patient has been experiencing progressive memory difficulties. She is concerned because he lives alone. The patient still drives and has not had any issues with that. He does not disclose items forgetting names of things, but still was able to recognize faces.      Allergies: Patient has no known allergies.    Current Outpatient Medications Ordered in Epic   Medication Sig Dispense Refill   • acetaminophen (TYLENOL) 500 MG Tab Tylenol Extra Strength 500 mg tablet    2 tablets every day by oral route.       No current [facility]-ordered facility-administered medications on file.       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 Samuel   • Urinary incontinence 01/27/2021       Past Surgical History:   Procedure Laterality Date   • CATARACT PHACO WITH IOL Right 2/10/2021    Procedure: PHACOEMULSIFICATION, CATARACT, WITH IOL INSERTION;  Surgeon: Ralph Saldivar M.D.;  Location: SURGERY SAME DAY [facility];  Service: Ophthalmology   • OTHER     • PACEMAKER INSERTION     • TONSILLECTOMY      when he was 4 years old       Social History     Tobacco Use   • Smoking status: Never Smoker   • Smokeless tobacco: Never Used   Vaping Use   • Vaping Use: Never used   Substance Use Topics   • Alcohol use: No     Alcohol/week: 0.0 oz   • Drug use: No       Social History     Social History Narrative   • Not on file       Family History   Problem Relation Age of Onset   • Heart Disease Father        Health Maintenance: Completed    ROS:   Gen: no fevers/chills  Pulm: no sob, no cough  CV: no chest pain, no palpitations  GI: no nausea/vomiting, no diarrhea  Neuro: no headaches, no numbness/tingling      Objective:     Exam: /62   Pulse 63   Temp 37 °C (98.6 °F)   Resp 14   Ht 1.575 m (5' 2\")   Wt 59 kg (130 lb)   SpO2 97%  Body mass index is 23.78 kg/m².    General: Normal appearing. No distress.  HEENT: Normocephalic. Eyes conjunctiva clear lids without ptosis, pupils equal and reactive to light accommodation, oropharynx is without erythema, edema or exudates.   Pulmonary: Clear to ausculation.  Normal effort. No rales, ronchi, or wheezing.  Cardiovascular: Regular rate and rhythm without murmur.   Abdomen: Soft, nontender, nondistended.   Musculoskeletal: No extremity cyanosis, clubbing, or edema.  Psych: Normal mood and affect. Alert and oriented x3. Judgment and insight is normal.    Assessment & Plan:   89 y.o. male with the following -    Prostate cancer (HCC)  This is a chronic condition. Progressive. The patient is followed by Urology Dr Susan. " [place]. He was diagnosed in 2012 and completed a course of radiation treatment. Patient's recent PSA on 12/7/2021 was 9.28, up from 3.27. He was started on Lupron every 3 months 12/2021. CT abdomen and pelvis on 11/18/2021 showed no evidence of metastatic disease. Nuclear bone scan on 11/18/2021 showed no new suspicious activity.    Memory loss  This is a chronic condition. Progressive. The patient had his geriatric wellness assessment on 11/1/2021. He scored poorly on the mini cog screening assessment, and was recommended to have further evaluation. He was unable to recall 3 words and Galdo clock properly. The patient's daughter does report that the patient has been experiencing progressive memory difficulties. She is concerned because he lives alone.  - Referral to Neurology    Calculus of gallbladder without cholecystitis without obstruction  This is a chronic condition. The patient was seen in the ED on 12/1/2021 for acute abdominal pain. Right upper quadrant ultrasound showed gallstones with intra and extrahepatic biliary duct dilation. CT abdomen and pelvis showed cholelithiasis with intra and extrahepatic biliary dilation. The patient currently denies abdominal pain. The patient is scheduled to [facility].    Screening for deficiency anemia  - CBC WITH DIFFERENTIAL; Future    Encounter for screening for diabetes mellitus  - Comp Metabolic Panel; Future    Screening for cardiovascular condition  - Lipid Profile; Future    Return in about 6 months (around 7/20/2022) for f/u labs.    Please note that this dictation was created using voice recognition software. I have made every reasonable attempt to correct obvious errors, but I expect that there are errors of grammar and possibly content that I did not discover before finalizing the note.

## 2022-02-04 ENCOUNTER — HOSPITAL ENCOUNTER (OUTPATIENT)
Dept: LAB | Facility: MEDICAL CENTER | Age: 87
End: 2022-02-04
Attending: INTERNAL MEDICINE
Payer: MEDICARE

## 2022-02-04 LAB
ALBUMIN SERPL BCP-MCNC: 4.4 G/DL (ref 3.2–4.9)
ALBUMIN/GLOB SERPL: 2.2 G/DL
ALP SERPL-CCNC: 74 U/L (ref 30–99)
ALT SERPL-CCNC: 13 U/L (ref 2–50)
ANION GAP SERPL CALC-SCNC: 11 MMOL/L (ref 7–16)
APTT PPP: 34.4 SEC (ref 24.7–36)
AST SERPL-CCNC: 24 U/L (ref 12–45)
BILIRUB CONJ SERPL-MCNC: <0.2 MG/DL (ref 0.1–0.5)
BILIRUB INDIRECT SERPL-MCNC: NORMAL MG/DL (ref 0–1)
BILIRUB SERPL-MCNC: 0.6 MG/DL (ref 0.1–1.5)
BUN SERPL-MCNC: 21 MG/DL (ref 8–22)
CALCIUM SERPL-MCNC: 9.1 MG/DL (ref 8.5–10.5)
CHLORIDE SERPL-SCNC: 110 MMOL/L (ref 96–112)
CO2 SERPL-SCNC: 24 MMOL/L (ref 20–33)
CREAT SERPL-MCNC: 1.06 MG/DL (ref 0.5–1.4)
GLOBULIN SER CALC-MCNC: 2 G/DL (ref 1.9–3.5)
GLUCOSE SERPL-MCNC: 96 MG/DL (ref 65–99)
INR PPP: 1.09 (ref 0.87–1.13)
POTASSIUM SERPL-SCNC: 4.3 MMOL/L (ref 3.6–5.5)
PROT SERPL-MCNC: 6.4 G/DL (ref 6–8.2)
PROTHROMBIN TIME: 13.8 SEC (ref 12–14.6)
SODIUM SERPL-SCNC: 145 MMOL/L (ref 135–145)

## 2022-02-04 PROCEDURE — 36415 COLL VENOUS BLD VENIPUNCTURE: CPT

## 2022-02-04 PROCEDURE — 80053 COMPREHEN METABOLIC PANEL: CPT

## 2022-02-04 PROCEDURE — 84080 ASSAY ALKALINE PHOSPHATASES: CPT

## 2022-02-04 PROCEDURE — 82248 BILIRUBIN DIRECT: CPT

## 2022-02-04 PROCEDURE — 84075 ASSAY ALKALINE PHOSPHATASE: CPT

## 2022-02-04 PROCEDURE — 85730 THROMBOPLASTIN TIME PARTIAL: CPT

## 2022-02-04 PROCEDURE — 85025 COMPLETE CBC W/AUTO DIFF WBC: CPT

## 2022-02-04 PROCEDURE — 85610 PROTHROMBIN TIME: CPT

## 2022-02-05 LAB
BASOPHILS # BLD AUTO: 1.1 % (ref 0–1.8)
BASOPHILS # BLD: 0.05 K/UL (ref 0–0.12)
EOSINOPHIL # BLD AUTO: 0.2 K/UL (ref 0–0.51)
EOSINOPHIL NFR BLD: 4.3 % (ref 0–6.9)
ERYTHROCYTE [DISTWIDTH] IN BLOOD BY AUTOMATED COUNT: 52.4 FL (ref 35.9–50)
HCT VFR BLD AUTO: 38.9 % (ref 42–52)
HGB BLD-MCNC: 12.8 G/DL (ref 14–18)
IMM GRANULOCYTES # BLD AUTO: 0.01 K/UL (ref 0–0.11)
IMM GRANULOCYTES NFR BLD AUTO: 0.2 % (ref 0–0.9)
LYMPHOCYTES # BLD AUTO: 0.82 K/UL (ref 1–4.8)
LYMPHOCYTES NFR BLD: 17.6 % (ref 22–41)
MCH RBC QN AUTO: 32.3 PG (ref 27–33)
MCHC RBC AUTO-ENTMCNC: 32.9 G/DL (ref 33.7–35.3)
MCV RBC AUTO: 98.2 FL (ref 81.4–97.8)
MONOCYTES # BLD AUTO: 0.5 K/UL (ref 0–0.85)
MONOCYTES NFR BLD AUTO: 10.8 % (ref 0–13.4)
NEUTROPHILS # BLD AUTO: 3.07 K/UL (ref 1.82–7.42)
NEUTROPHILS NFR BLD: 66 % (ref 44–72)
NRBC # BLD AUTO: 0 K/UL
NRBC BLD-RTO: 0 /100 WBC
PLATELET # BLD AUTO: 164 K/UL (ref 164–446)
PMV BLD AUTO: 10.2 FL (ref 9–12.9)
RBC # BLD AUTO: 3.96 M/UL (ref 4.7–6.1)
WBC # BLD AUTO: 4.7 K/UL (ref 4.8–10.8)

## 2022-02-08 LAB
ALP BONE SERPL-CCNC: 30 U/L (ref 0–55)
ALP ISOS SERPL HS-CCNC: 0 U/L
ALP LIVER SERPL-CCNC: 45 U/L (ref 0–94)
ALP SERPL-CCNC: 75 U/L (ref 40–120)

## 2022-02-10 ENCOUNTER — HOSPITAL ENCOUNTER (OUTPATIENT)
Dept: RADIOLOGY | Facility: MEDICAL CENTER | Age: 87
End: 2022-02-10
Attending: INTERNAL MEDICINE
Payer: MEDICARE

## 2022-02-10 DIAGNOSIS — R79.89 ELEVATED LFTS: ICD-10-CM

## 2022-02-10 DIAGNOSIS — K80.20 CALCULUS OF GALLBLADDER WITHOUT CHOLECYSTITIS WITHOUT OBSTRUCTION: ICD-10-CM

## 2022-02-10 DIAGNOSIS — K80.50 CHOLEDOCHOLITHIASIS: ICD-10-CM

## 2022-02-10 DIAGNOSIS — R93.89 ABNORMAL CT SCAN: ICD-10-CM

## 2022-02-10 PROCEDURE — 76700 US EXAM ABDOM COMPLETE: CPT

## 2022-03-01 ENCOUNTER — OFFICE VISIT (OUTPATIENT)
Dept: MEDICAL GROUP | Facility: PHYSICIAN GROUP | Age: 87
End: 2022-03-01
Payer: MEDICARE

## 2022-03-01 VITALS
HEART RATE: 64 BPM | RESPIRATION RATE: 20 BRPM | BODY MASS INDEX: 24.15 KG/M2 | HEIGHT: 62 IN | WEIGHT: 131.25 LBS | TEMPERATURE: 97.8 F | DIASTOLIC BLOOD PRESSURE: 64 MMHG | OXYGEN SATURATION: 98 % | SYSTOLIC BLOOD PRESSURE: 106 MMHG

## 2022-03-01 DIAGNOSIS — Z12.83 SKIN CANCER SCREENING: ICD-10-CM

## 2022-03-01 DIAGNOSIS — D22.9 NEVUS: ICD-10-CM

## 2022-03-01 DIAGNOSIS — L82.1 SEBORRHEIC KERATOSES: ICD-10-CM

## 2022-03-01 PROCEDURE — 99213 OFFICE O/P EST LOW 20 MIN: CPT | Performed by: INTERNAL MEDICINE

## 2022-03-01 ASSESSMENT — FIBROSIS 4 INDEX: FIB4 SCORE: 3.61

## 2022-03-01 NOTE — PROGRESS NOTES
"Subjective:     CC:   Chief Complaint   Patient presents with   • Nevus         HPI:   Chico presents today to discuss the following issues:    The patient reports some skin spots that he would like to have examined.  He reports the one on his neck has been irritating him, he does not know how long it has been there.  He is otherwise doing well.  His daughter reports that he has not been drinking enough water, and she feels it helps with his memory.      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 Samuel   • Urinary incontinence 01/27/2021       Social History     Tobacco Use   • Smoking status: Never Smoker   • Smokeless tobacco: Never Used   Vaping Use   • Vaping Use: Never used   Substance Use Topics   • Alcohol use: No     Alcohol/week: 0.0 oz   • Drug use: No       Current Outpatient Medications Ordered in Epic   Medication Sig Dispense Refill   • acetaminophen (TYLENOL) 500 MG Tab Tylenol Extra Strength 500 mg tablet    2 tablets every day by oral route.       No current [facility]-ordered facility-administered medications on file.       Allergies:  Patient has no known allergies.    Health Maintenance: Completed    ROS:   Denies any recent fevers or chills. No nausea or vomiting. No chest pains or shortness of breath.      Objective:       Exam:  /64 (BP Location: Right arm, Patient Position: Sitting, BP Cuff Size: Adult)   Pulse 64   Temp 36.6 °C (97.8 °F) (Temporal)   Resp 20   Ht 1.575 m (5' 2\")   Wt 59.5 kg (131 lb 4 oz)   SpO2 98%   BMI 24.01 kg/m²  Body mass index is 24.01 kg/m².    Gen: Alert and oriented, No apparent distress.  Skin: 1 cm hypopigmented nevus right posterior neck, numerous SKs over torso        Assessment & Plan:     89 y.o. male with the following -     Seborrheic keratoses  Nevus  Skin cancer screening  These are chronic medical " conditions.  No concerning findings at this time, but feel patient could benefit from a thorough skin exam by dermatology, in particular the nevus on his right posterior neck.  - Referral to Dermatology      I spent a total of 20 minutes with record review, exam, communication with the patient, communication with other providers, and documentation of this encounter.      Return if symptoms worsen or fail to improve.    Please note that this dictation was created using voice recognition software. I have made every reasonable attempt to correct obvious errors, but I expect that there are errors of grammar and possibly content that I did not discover before finalizing the note.

## 2022-03-22 ENCOUNTER — HOSPITAL ENCOUNTER (OUTPATIENT)
Dept: LAB | Facility: MEDICAL CENTER | Age: 87
End: 2022-03-22
Attending: PHYSICIAN ASSISTANT
Payer: MEDICARE

## 2022-03-22 LAB
PSA SERPL-MCNC: 0.47 NG/ML (ref 0–4)
TESTOST SERPL-MCNC: <12 NG/DL (ref 175–781)

## 2022-03-22 PROCEDURE — 36415 COLL VENOUS BLD VENIPUNCTURE: CPT

## 2022-03-22 PROCEDURE — 84153 ASSAY OF PSA TOTAL: CPT

## 2022-03-22 PROCEDURE — 84403 ASSAY OF TOTAL TESTOSTERONE: CPT

## 2022-03-29 ENCOUNTER — HOSPITAL ENCOUNTER (OUTPATIENT)
Dept: LAB | Facility: MEDICAL CENTER | Age: 87
End: 2022-03-29
Attending: PSYCHIATRY & NEUROLOGY
Payer: MEDICARE

## 2022-03-29 ENCOUNTER — OFFICE VISIT (OUTPATIENT)
Dept: NEUROLOGY | Facility: MEDICAL CENTER | Age: 87
End: 2022-03-29
Attending: PSYCHIATRY & NEUROLOGY
Payer: MEDICARE

## 2022-03-29 VITALS
WEIGHT: 130.73 LBS | HEIGHT: 62 IN | SYSTOLIC BLOOD PRESSURE: 128 MMHG | RESPIRATION RATE: 15 BRPM | HEART RATE: 76 BPM | DIASTOLIC BLOOD PRESSURE: 62 MMHG | BODY MASS INDEX: 24.06 KG/M2 | OXYGEN SATURATION: 95 % | TEMPERATURE: 98.1 F

## 2022-03-29 DIAGNOSIS — F03.A0 MILD DEMENTIA (HCC): ICD-10-CM

## 2022-03-29 DIAGNOSIS — G30.1 LATE ONSET ALZHEIMER'S DEMENTIA WITHOUT BEHAVIORAL DISTURBANCE (HCC): Primary | ICD-10-CM

## 2022-03-29 DIAGNOSIS — G94 OTHER DISORDERS OF BRAIN IN DISEASES CLASSIFIED ELSEWHERE: ICD-10-CM

## 2022-03-29 DIAGNOSIS — G30.9 ALZHEIMER'S DISEASE, UNSPECIFIED (CODE) (HCC): ICD-10-CM

## 2022-03-29 DIAGNOSIS — F02.80 LATE ONSET ALZHEIMER'S DEMENTIA WITHOUT BEHAVIORAL DISTURBANCE (HCC): Primary | ICD-10-CM

## 2022-03-29 DIAGNOSIS — G30.1 ALZHEIMER'S DISEASE WITH LATE ONSET (CODE) (HCC): ICD-10-CM

## 2022-03-29 LAB
ANION GAP SERPL CALC-SCNC: 11 MMOL/L (ref 7–16)
BASOPHILS # BLD AUTO: 0.8 % (ref 0–1.8)
BASOPHILS # BLD: 0.04 K/UL (ref 0–0.12)
BUN SERPL-MCNC: 19 MG/DL (ref 8–22)
CALCIUM SERPL-MCNC: 9.6 MG/DL (ref 8.5–10.5)
CHLORIDE SERPL-SCNC: 106 MMOL/L (ref 96–112)
CO2 SERPL-SCNC: 25 MMOL/L (ref 20–33)
CREAT SERPL-MCNC: 1 MG/DL (ref 0.5–1.4)
EOSINOPHIL # BLD AUTO: 0.14 K/UL (ref 0–0.51)
EOSINOPHIL NFR BLD: 2.9 % (ref 0–6.9)
ERYTHROCYTE [DISTWIDTH] IN BLOOD BY AUTOMATED COUNT: 48.1 FL (ref 35.9–50)
GFR SERPLBLD CREATININE-BSD FMLA CKD-EPI: 72 ML/MIN/1.73 M 2
GLUCOSE SERPL-MCNC: 91 MG/DL (ref 65–99)
HCT VFR BLD AUTO: 39.2 % (ref 42–52)
HGB BLD-MCNC: 13 G/DL (ref 14–18)
IMM GRANULOCYTES # BLD AUTO: 0.01 K/UL (ref 0–0.11)
IMM GRANULOCYTES NFR BLD AUTO: 0.2 % (ref 0–0.9)
LYMPHOCYTES # BLD AUTO: 1.06 K/UL (ref 1–4.8)
LYMPHOCYTES NFR BLD: 21.9 % (ref 22–41)
MCH RBC QN AUTO: 32.2 PG (ref 27–33)
MCHC RBC AUTO-ENTMCNC: 33.2 G/DL (ref 33.7–35.3)
MCV RBC AUTO: 97 FL (ref 81.4–97.8)
MONOCYTES # BLD AUTO: 0.62 K/UL (ref 0–0.85)
MONOCYTES NFR BLD AUTO: 12.8 % (ref 0–13.4)
NEUTROPHILS # BLD AUTO: 2.97 K/UL (ref 1.82–7.42)
NEUTROPHILS NFR BLD: 61.4 % (ref 44–72)
NRBC # BLD AUTO: 0 K/UL
NRBC BLD-RTO: 0 /100 WBC
PLATELET # BLD AUTO: 183 K/UL (ref 164–446)
PMV BLD AUTO: 9.4 FL (ref 9–12.9)
POTASSIUM SERPL-SCNC: 4.6 MMOL/L (ref 3.6–5.5)
RBC # BLD AUTO: 4.04 M/UL (ref 4.7–6.1)
SODIUM SERPL-SCNC: 142 MMOL/L (ref 135–145)
WBC # BLD AUTO: 4.8 K/UL (ref 4.8–10.8)

## 2022-03-29 PROCEDURE — 80048 BASIC METABOLIC PNL TOTAL CA: CPT

## 2022-03-29 PROCEDURE — 82607 VITAMIN B-12: CPT

## 2022-03-29 PROCEDURE — 99211 OFF/OP EST MAY X REQ PHY/QHP: CPT | Performed by: PSYCHIATRY & NEUROLOGY

## 2022-03-29 PROCEDURE — 99205 OFFICE O/P NEW HI 60 MIN: CPT | Performed by: PSYCHIATRY & NEUROLOGY

## 2022-03-29 PROCEDURE — 83921 ORGANIC ACID SINGLE QUANT: CPT

## 2022-03-29 PROCEDURE — 82746 ASSAY OF FOLIC ACID SERUM: CPT

## 2022-03-29 PROCEDURE — 84443 ASSAY THYROID STIM HORMONE: CPT

## 2022-03-29 PROCEDURE — 36415 COLL VENOUS BLD VENIPUNCTURE: CPT

## 2022-03-29 PROCEDURE — 85025 COMPLETE CBC W/AUTO DIFF WBC: CPT

## 2022-03-29 ASSESSMENT — MONTREAL COGNITIVE ASSESSMENT (MOCA)
1. ALTERNATING TRAIL MAKING: 1/1
7. [VIGILENCE] TAP WHEN HEARING DESIGNATED LETTER: 1/1
5. MEMORY TRIALS: SECOND TRIAL
ORIENTATION SUBSCORE: 3/6
3. DRAW A CLOCK: CONTOUR, NUMBERS, HANDS: 1/3
WHAT IS THE TOTAL SCORE (OUT OF 30): 16
2. COPY DRAWING: 1/1
10. [FLUENCY] NAME WORDS STARTING WITH DESIGNATED LETTER: 0/1
WHAT IS THE VERSION OF MOCA ADMINISTERED: 8.2
4. NAME EACH OF THE THREE ANIMALS SHOWN: 2/3
11. FOR EACH PAIR OF WORDS, WHAT CATEGORY DO THEY BELONG TO (OUT OF 2): 2/2
CATEGORY CUE (IF APPLICABLE): 2
8. SERIAL SUBTRACTION OF 7S: 2 OR 3/5
9. REPEAT EACH SENTENCE: 2/2
6. READ LIST OF DIGITS [FORWARD/BACKWARD]: 1/2
DELAYED RECALL SUBSCORE: 0/5

## 2022-03-29 ASSESSMENT — FIBROSIS 4 INDEX: FIB4 SCORE: 3.61

## 2022-03-29 ASSESSMENT — PATIENT HEALTH QUESTIONNAIRE - PHQ9: CLINICAL INTERPRETATION OF PHQ2 SCORE: 0

## 2022-03-29 NOTE — PROGRESS NOTES
"Reason for Neurology Consult:  Memory Disturbances for over 3-4 years    History of present illness:    Chico Cedillo 89 y.o. right handed gentleman who is originally from [place] and moved to [place] in 1965. He is  since 2005. Daughter is here who lives in [place] who visit her father every 2 weeks since around July 2021.    Problems List reviewed.    When asked directly about his memory and thinking issues, Chico stated  that his memory has changed \"probably but very little.\" He is requires to look at the calendar frequently to remember the date. He denies frequently misplacing items or forgetting appointments or people names in the recent few years.    Daughter added that there were very minor changes in memory (short term) staring back in  around [id number] but more noticeable cognitive/memory issues become more apparent in around   September 2020 when she noticed Chico having \" confusion with bills and paperwork and feeling overwhelmed by paperwork\" and the daughter has come more frequently to process the paperwork and doing the filing the paperwork.  Since January 2022, daughter thinks that Chico has been sharper.    There has been no significant decline in cognition or memory in the last 6 months.    He has more problems processing and making decisions including his long term care insurance about whether to pay certain premiums and did cost comparisons and it was hard for him to comprehend this type of information which was much easier to do about 3-4 years ago per the daughter.    Short term memory changes- \"Where is my bill fold or check book\" may be asked to the daughter 2 times a week.    Speech-communication has not changed notably in the last 3-4 years.    No known marcio cognitive/memory disturbances (dementia) in 1st or 2nd degree relatives.    No significant alcohol use in adult life.    No paranoia,delusions,sundowning,hallucinations (visual or auditory) in the recent months.    No falls or " near falls in the last 6 months known or observed.        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 Samuel   • Urinary incontinence 01/27/2021       Past surgical history:   Past Surgical History:   Procedure Laterality Date   • CATARACT PHACO WITH IOL Right 2/10/2021    Procedure: PHACOEMULSIFICATION, CATARACT, WITH IOL INSERTION;  Surgeon: Ralph Saldivar M.D.;  [facility];  Service: Ophthalmology   • OTHER     • PACEMAKER INSERTION     • TONSILLECTOMY      when he was 4 years old         Social history:   Social History     Socioeconomic History   • Marital status:      Spouse name: Not on file   • Number of children: Not on file   • Years of education: Not on file   • Highest education level: Bachelor's degree (e.g., BA, AB, BS)   Occupational History   • Not on file   Tobacco Use   • Smoking status: Never Smoker   • Smokeless tobacco: Never Used   Vaping Use   • Vaping Use: Never used   Substance and Sexual Activity   • Alcohol use: No     Alcohol/week: 0.0 oz   • Drug use: No   • Sexual activity: Not Currently   Other Topics Concern   • Not on file   Social History Narrative   • Not on file     Social Determinants of Health     Financial Resource Strain: Low Risk    • Difficulty of Paying Living Expenses: Not hard at all   Food  "Insecurity: No Food Insecurity   • Worried About Running Out of Food in the Last Year: Never true   • Ran Out of Food in the Last Year: Never true   Transportation Needs: No Transportation Needs   • Lack of Transportation (Medical): No   • Lack of Transportation (Non-Medical): No   Physical Activity: Insufficiently Active   • Days of Exercise per Week: 6 days   • Minutes of Exercise per Session: 10 min   Stress: No Stress Concern Present   • Feeling of Stress : Not at all   Social Connections: Socially Isolated   • Frequency of Communication with Friends and Family: More than three times a week   • Frequency of Social Gatherings with Friends and Family: Once a week   • Attends [employer] Services: Never   • Active Member of Clubs or Organizations: No   • Attends Club or Organization Meetings: Never   • Marital Status:    Intimate Partner Violence: Not on file   Housing Stability: Low Risk    • Unable to Pay for Housing in the Last Year: No   • Number of Places Lived in the Last Year: 1   • Unstable Housing in the Last Year: No       Family history:   Family History   Problem Relation Age of Onset   • Heart Disease Father          Current medications:   Current Outpatient Medications   Medication   • acetaminophen (TYLENOL) 500 MG Tab     No current facility-administered medications for this visit.       Medication Allergy:  No Known Allergies        Physical examination:   Vitals:    03/29/22 1524   BP: 128/62   BP Location: Left arm   Patient Position: Sitting   BP Cuff Size: Adult   Pulse: 76   Resp: 15   Temp: 36.7 °C (98.1 °F)   TempSrc: Temporal   SpO2: 95%   Weight: 59.3 kg (130 lb 11.7 oz)   Height: 1.575 m (5' 2\")       Normal cephalic atraumatic.  There is full range of movement around the neck in all directions without restrictions or discrete pain evoked triggers.  No lower extremity edema.      Neurological  Exam:      [place] Cognitive Assessment (MOCA) Version 7.1    Years of Education: 4 years " of college.    TOTAL SCORE: 16/30  (to be scanned into the MEDIA section in the E.M.R.)          Mental status: Awake, alert and fully oriented to person, place and situation. Normal attention and concentration.  Did not appear/act combative,irritable,anxious,paranoid/delusional or aggressive to or with me.    Speech and language: Speech is fluent without errors, clear, intact to repetition and intact to naming.     Follows 3 step motor commands in sequence without significant delay and correctly.    Cranial nerve exam:  II: Pupils are equally round and reactive to light. Visual fields are intact by confrontation.  III, IV, VI: EOMI, no diplopia, no ptosis.  V: Sensation to light touch is normal over V1-3 distributions bilaterally.  .  VII: Facial movements are symmetrical. There is no facial droop. .  VIII: Hearing intact to soft speech and finger rub bilaterally  IX: Palate elevates symmetrically, uvula is midline. Dysarthria is not present.  XI: Shoulder shrug are symmetrical and strong.   XII: Tongue protrudes midline.      Motor exam:  Muscle tone is normal in all 4 limbs. and No abnormal movements appreciated.    Muscle strength:    Neck Flexors/Extensors: 5/5       Right  Left  Deltoid   5/5  5/5      Biceps   5/5  5/5  Triceps  5/5  5/5   Wrist extensors 5/5  5/5  Wrist flexors  5/5  5/5     5/5  5/5  Interossei  5/5  5/5  Thenar (APB)  5/5  5/5   Hip flexors  5/5  5/5  Quadriceps  5/5  5/5    Hamstrings  5/5  5/5  Dorsiflexors  5/5  5/5  Plantarflexors  5/5  5/5  Toe extension  5/5  5/5          Reflexes:       Right  Left  Biceps   2/4  2/4  Triceps  2/4  2/4  Brachioradialis 2/4  2/4  Knee jerk  2/4  2/4  Ankle jerk  2/4  2/4     Frontal release signs are absent    bilaterally toes are downgoing to plantar stimulation..    Coordination (finger-to-nose, heel/knee/shin, rapid alternating movements) was normal.     There was no ataxia, no tremors, and no dysmetria.     Station and gait > easily stands up  "from exam chair without retropulsion,veering,leaning,swaying (to either side).     He is very mildly stooped; turns well and no en block turning.\    No retropulsion or forward pulsion.      Labs and Tests:    Reviewed in the EMR from the last 12 months.     NEUROIMAGING: No recent Brain Imaging.    Impression/Plans/Recommendations:    1. 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    Onset of subtle memory decline as far back as 3-4 year ago and becoming more noticeable in the last 2 years.    Not parkinsonian.    Today, I reviewed the clinical criteria and reviewed several  scenarios of the differences being using the medical terms of normal brain aging (age associated memory impairment),  Mild Cognitive Impairment (MCI) and Dementia.    Dementia  is a syndrome but statistically and for the majority of patients  occurs due  to a more rapid aging of the brain tissue or potentially from injury to certain parts of one's brain ( often from such contributing factors as  the cumulative effects of alcohol, from one or more ischemic or hemmorhagic stroke(s), from neurodegeneration or long standing with/without suboptimally controlled Hypertension). It is for some of these potential factors as to why I recommend a brain imaging test (Head CT or Brain MRI) be done for the 1st time or in certain circumstances repeated for comparison purposes  as such imaging can suggest one or more factors as to the reason(s) for the person's cognitive/memory changes. In fact, a normal or \"age related\" finding on a brain imaging test in and of itself is useful clinical and objective information to have in the evaluation of a person who has either an acute, evolving or even chronic (months to years) long cognitive/memory complaint.    Additional factors or contributors to Brain Health issues can be summarized in several books/references which I discussed as well today.     Goals going forwards include:    A. Paying " attention to one's risk factors and reducing their prevalence or potential impact on one's changing memory/thinking> an excellent example would be to stop smoking, reduce or eliminate alcohol use (depending on the amount and frequency of usage), maintain good blood pressure control by buying a digital arm blood pressure cuff such as an OMRON Series 3 or 5 and checking one's blood pressure atleast 3 days per week (in the am and early afternoon) that the numbers are under 140/90 and working as needed with the primary care doctor  to optimize blood pressure control).    B. Encouraging proper sleep hygiene which for most adults is 7 to 8 hours of uninterrupted sleep per night.      C. Encouraging some form of exercise preferable aerobic forms to be done (4 to 5 days per week- 30 minutes minimum per day)> 150 minutes per week as a goal. Example activities could include fast walking (up a slight incline), jogging, cycling (road or stationary), swimming,tennis. Essentially, even basic walking on a flat surface or a treadmill would be better than doing nothing.    D. Maintaining or forming new social contacts with family and friends  and a positive attitude despite the concerns and/or ongoing issues with thinking and/or memory.    E. Eating well which means a diet low in salt  (under 2 grams per day), sugar and saturated fat.    F. Maintaining one's BMI (Body Mass Index) under 30.    G. Consideration of the use of cognitive enhancers (acetylcholinerase inhibitors such as Aricept vs an NMDA Receptor agent like Namenda). Pros and cons of such compounds in terms of predicted efficacy and side effects profiles reviewed. We reviewed the consideration of these type of medications and Chico and daughter both agree not to pursue such medications at this point.    H. Brain MRI to be ordered to access structure of the Brain.    I. We reviewed a Brain PET Scan and neither Chico nor daughter want to pursue this type of more specific  analytic tool.    J. Routine additional blood work including TSH,B12,Folate and B1 blood levels.    K. OT guided Driving Simulator to be done after discussion with Chico and daughter and despite his lack of history of any accidents or incidents driving in the last 12 months.    L. Alzheimer's Association information reviewed with daughter.       have performed  a history and physical exam and a directed /focused  ROS today.    Total time spent today or this patient's care was 61  minutes   and included reviewing  the diagnostic workup to date (such as labs and imaging as well as interpreting such tests relevant to this patient's neurological condition),  reviewing/obtaining separately obtained history (from patient and/or accompanying ffamily member)  for today's neurological problem(s) ,counseling and educating the patient and family member on issues related to cognition/memory and cognitive health factors and documenting  the clinical information in the EMR.    Follow up in PRN.        Jason Hearn MD  [facility].   [facility]

## 2022-03-30 LAB
FOLATE SERPL-MCNC: 22.6 NG/ML
TSH SERPL DL<=0.005 MIU/L-ACNC: 1.62 UIU/ML (ref 0.38–5.33)
VIT B12 SERPL-MCNC: 292 PG/ML (ref 211–911)

## 2022-03-31 ENCOUNTER — HOSPITAL ENCOUNTER (OUTPATIENT)
Dept: LAB | Facility: MEDICAL CENTER | Age: 87
End: 2022-03-31
Attending: INTERNAL MEDICINE
Payer: MEDICARE

## 2022-03-31 DIAGNOSIS — Z13.0 SCREENING FOR DEFICIENCY ANEMIA: ICD-10-CM

## 2022-03-31 DIAGNOSIS — Z13.1 ENCOUNTER FOR SCREENING FOR DIABETES MELLITUS: ICD-10-CM

## 2022-03-31 DIAGNOSIS — Z13.6 SCREENING FOR CARDIOVASCULAR CONDITION: ICD-10-CM

## 2022-03-31 LAB
ANION GAP SERPL CALC-SCNC: 10 MMOL/L (ref 7–16)
BUN SERPL-MCNC: 17 MG/DL (ref 8–22)
CALCIUM SERPL-MCNC: 9.3 MG/DL (ref 8.5–10.5)
CHLORIDE SERPL-SCNC: 108 MMOL/L (ref 96–112)
CO2 SERPL-SCNC: 26 MMOL/L (ref 20–33)
CREAT SERPL-MCNC: 0.99 MG/DL (ref 0.5–1.4)
FASTING STATUS PATIENT QL REPORTED: NORMAL
GFR SERPLBLD CREATININE-BSD FMLA CKD-EPI: 73 ML/MIN/1.73 M 2
GLUCOSE SERPL-MCNC: 96 MG/DL (ref 65–99)
POTASSIUM SERPL-SCNC: 3.9 MMOL/L (ref 3.6–5.5)
SODIUM SERPL-SCNC: 144 MMOL/L (ref 135–145)

## 2022-03-31 PROCEDURE — 80048 BASIC METABOLIC PNL TOTAL CA: CPT

## 2022-03-31 PROCEDURE — 36415 COLL VENOUS BLD VENIPUNCTURE: CPT

## 2022-04-03 LAB — METHYLMALONATE SERPL-SCNC: 0.72 UMOL/L (ref 0–0.4)

## 2022-04-20 ENCOUNTER — APPOINTMENT (OUTPATIENT)
Dept: RADIOLOGY | Facility: MEDICAL CENTER | Age: 87
End: 2022-04-20
Attending: PSYCHIATRY & NEUROLOGY
Payer: MEDICARE

## 2022-04-20 DIAGNOSIS — G30.1 ALZHEIMER'S DISEASE WITH LATE ONSET (CODE) (HCC): ICD-10-CM

## 2022-04-20 DIAGNOSIS — G30.9 ALZHEIMER'S DISEASE, UNSPECIFIED (CODE) (HCC): ICD-10-CM

## 2022-04-20 PROCEDURE — 70450 CT HEAD/BRAIN W/O DYE: CPT | Mod: ME

## 2022-04-21 ENCOUNTER — APPOINTMENT (RX ONLY)
Dept: URBAN - METROPOLITAN AREA CLINIC 22 | Facility: CLINIC | Age: 87
Setting detail: DERMATOLOGY
End: 2022-04-21

## 2022-04-21 DIAGNOSIS — D18.0 HEMANGIOMA: ICD-10-CM

## 2022-04-21 DIAGNOSIS — L81.4 OTHER MELANIN HYPERPIGMENTATION: ICD-10-CM

## 2022-04-21 DIAGNOSIS — Z71.89 OTHER SPECIFIED COUNSELING: ICD-10-CM

## 2022-04-21 DIAGNOSIS — L82.0 INFLAMED SEBORRHEIC KERATOSIS: ICD-10-CM

## 2022-04-21 DIAGNOSIS — L57.0 ACTINIC KERATOSIS: ICD-10-CM

## 2022-04-21 DIAGNOSIS — D22 MELANOCYTIC NEVI: ICD-10-CM

## 2022-04-21 DIAGNOSIS — Z85.828 PERSONAL HISTORY OF OTHER MALIGNANT NEOPLASM OF SKIN: ICD-10-CM

## 2022-04-21 DIAGNOSIS — L82.1 OTHER SEBORRHEIC KERATOSIS: ICD-10-CM

## 2022-04-21 PROBLEM — D18.01 HEMANGIOMA OF SKIN AND SUBCUTANEOUS TISSUE: Status: ACTIVE | Noted: 2022-04-21

## 2022-04-21 PROBLEM — D22.5 MELANOCYTIC NEVI OF TRUNK: Status: ACTIVE | Noted: 2022-04-21

## 2022-04-21 PROCEDURE — ? SUNSCREEN RECOMMENDATIONS

## 2022-04-21 PROCEDURE — 17004 DESTROY PREMAL LESIONS 15/>: CPT

## 2022-04-21 PROCEDURE — ? ADDITIONAL NOTES

## 2022-04-21 PROCEDURE — 99203 OFFICE O/P NEW LOW 30 MIN: CPT | Mod: 25

## 2022-04-21 PROCEDURE — ? COUNSELING

## 2022-04-21 PROCEDURE — ? LIQUID NITROGEN

## 2022-04-21 ASSESSMENT — LOCATION SIMPLE DESCRIPTION DERM
LOCATION SIMPLE: LEFT FOREHEAD
LOCATION SIMPLE: NECK
LOCATION SIMPLE: NOSE
LOCATION SIMPLE: RIGHT FOREHEAD
LOCATION SIMPLE: LEFT FOREARM
LOCATION SIMPLE: RIGHT CHEEK
LOCATION SIMPLE: RIGHT EAR
LOCATION SIMPLE: LEFT UPPER BACK
LOCATION SIMPLE: ABDOMEN
LOCATION SIMPLE: SCALP
LOCATION SIMPLE: RIGHT LOWER BACK
LOCATION SIMPLE: LEFT EAR
LOCATION SIMPLE: RIGHT FOREARM
LOCATION SIMPLE: LEFT CLAVICULAR SKIN

## 2022-04-21 ASSESSMENT — LOCATION DETAILED DESCRIPTION DERM
LOCATION DETAILED: RIGHT PROXIMAL DORSAL FOREARM
LOCATION DETAILED: LEFT FOREHEAD
LOCATION DETAILED: LEFT SUPERIOR HELIX
LOCATION DETAILED: RIGHT DISTAL DORSAL FOREARM
LOCATION DETAILED: LEFT CLAVICULAR SKIN
LOCATION DETAILED: LEFT INFERIOR HELIX
LOCATION DETAILED: RIGHT SUPERIOR MEDIAL MIDBACK
LOCATION DETAILED: LEFT PROXIMAL RADIAL DORSAL FOREARM
LOCATION DETAILED: RIGHT DISTAL RADIAL DORSAL FOREARM
LOCATION DETAILED: RIGHT INFERIOR FRONTAL SCALP
LOCATION DETAILED: RIGHT CENTRAL MALAR CHEEK
LOCATION DETAILED: RIGHT DISTAL ULNAR DORSAL FOREARM
LOCATION DETAILED: RIGHT SUPERIOR FOREHEAD
LOCATION DETAILED: RIGHT INFERIOR HELIX
LOCATION DETAILED: LEFT ANTERIOR EARLOBE
LOCATION DETAILED: EPIGASTRIC SKIN
LOCATION DETAILED: NASAL TIP
LOCATION DETAILED: LEFT SUPERIOR FOREHEAD
LOCATION DETAILED: RIGHT SUPERIOR HELIX
LOCATION DETAILED: RIGHT CENTRAL LATERAL NECK
LOCATION DETAILED: NASAL SUPRATIP
LOCATION DETAILED: LEFT LATERAL FOREHEAD
LOCATION DETAILED: LEFT SUPERIOR MEDIAL UPPER BACK
LOCATION DETAILED: NASAL DORSUM

## 2022-04-21 ASSESSMENT — LOCATION ZONE DERM
LOCATION ZONE: NOSE
LOCATION ZONE: FACE
LOCATION ZONE: TRUNK
LOCATION ZONE: ARM
LOCATION ZONE: EAR
LOCATION ZONE: NECK
LOCATION ZONE: SCALP

## 2022-04-21 NOTE — PROCEDURE: LIQUID NITROGEN
Render Note In Bullet Format When Appropriate: No
Show Aperture Variable?: Yes
Consent: The patient's consent was obtained including but not limited to risks of crusting, scabbing, blistering, scarring, darker or lighter pigmentary change, recurrence, incomplete removal and infection.
Number Of Freeze-Thaw Cycles: 2 freeze-thaw cycles
Post-Care Instructions: I reviewed with the patient in detail post-care instructions. Patient is to wear sunprotection, and avoid picking at any of the treated lesions. Pt may apply Vaseline to crusted or scabbing areas.
Detail Level: Detailed
Duration Of Freeze Thaw-Cycle (Seconds): 3

## 2022-06-22 ENCOUNTER — APPOINTMENT (OUTPATIENT)
Dept: OCCUPATIONAL THERAPY | Facility: REHABILITATION | Age: 87
End: 2022-06-22
Attending: PSYCHIATRY & NEUROLOGY
Payer: MEDICARE

## 2022-06-28 ENCOUNTER — HOSPITAL ENCOUNTER (OUTPATIENT)
Dept: LAB | Facility: MEDICAL CENTER | Age: 87
End: 2022-06-28
Attending: PHYSICIAN ASSISTANT
Payer: MEDICARE

## 2022-06-28 LAB
ALBUMIN SERPL BCP-MCNC: 4.2 G/DL (ref 3.2–4.9)
ALBUMIN/GLOB SERPL: 1.9 G/DL
ALP SERPL-CCNC: 62 U/L (ref 30–99)
ALT SERPL-CCNC: 12 U/L (ref 2–50)
ANION GAP SERPL CALC-SCNC: 10 MMOL/L (ref 7–16)
AST SERPL-CCNC: 19 U/L (ref 12–45)
BILIRUB SERPL-MCNC: 0.5 MG/DL (ref 0.1–1.5)
BUN SERPL-MCNC: 19 MG/DL (ref 8–22)
CALCIUM SERPL-MCNC: 9.1 MG/DL (ref 8.5–10.5)
CHLORIDE SERPL-SCNC: 109 MMOL/L (ref 96–112)
CO2 SERPL-SCNC: 26 MMOL/L (ref 20–33)
CREAT SERPL-MCNC: 1.11 MG/DL (ref 0.5–1.4)
GFR SERPLBLD CREATININE-BSD FMLA CKD-EPI: 63 ML/MIN/1.73 M 2
GLOBULIN SER CALC-MCNC: 2.2 G/DL (ref 1.9–3.5)
GLUCOSE SERPL-MCNC: 113 MG/DL (ref 65–99)
POTASSIUM SERPL-SCNC: 4 MMOL/L (ref 3.6–5.5)
PROT SERPL-MCNC: 6.4 G/DL (ref 6–8.2)
SODIUM SERPL-SCNC: 145 MMOL/L (ref 135–145)
TESTOST SERPL-MCNC: <12 NG/DL (ref 175–781)

## 2022-06-28 PROCEDURE — 84153 ASSAY OF PSA TOTAL: CPT

## 2022-06-28 PROCEDURE — 84403 ASSAY OF TOTAL TESTOSTERONE: CPT

## 2022-06-28 PROCEDURE — 36415 COLL VENOUS BLD VENIPUNCTURE: CPT

## 2022-06-28 PROCEDURE — 80053 COMPREHEN METABOLIC PANEL: CPT

## 2022-06-29 LAB — PSA SERPL-MCNC: 0.26 NG/ML (ref 0–4)

## 2022-06-30 ENCOUNTER — HOSPITAL ENCOUNTER (OUTPATIENT)
Dept: LAB | Facility: MEDICAL CENTER | Age: 87
End: 2022-06-30
Attending: INTERNAL MEDICINE
Payer: MEDICARE

## 2022-06-30 LAB
ALBUMIN SERPL BCP-MCNC: 4.2 G/DL (ref 3.2–4.9)
ALBUMIN/GLOB SERPL: 2 G/DL
ALP SERPL-CCNC: 63 U/L (ref 30–99)
ALT SERPL-CCNC: 10 U/L (ref 2–50)
ANION GAP SERPL CALC-SCNC: 8 MMOL/L (ref 7–16)
AST SERPL-CCNC: 18 U/L (ref 12–45)
BASOPHILS # BLD AUTO: 1.1 % (ref 0–1.8)
BASOPHILS # BLD: 0.05 K/UL (ref 0–0.12)
BILIRUB SERPL-MCNC: 0.4 MG/DL (ref 0.1–1.5)
BUN SERPL-MCNC: 18 MG/DL (ref 8–22)
CALCIUM SERPL-MCNC: 9.2 MG/DL (ref 8.5–10.5)
CHLORIDE SERPL-SCNC: 110 MMOL/L (ref 96–112)
CHOLEST SERPL-MCNC: 172 MG/DL (ref 100–199)
CO2 SERPL-SCNC: 27 MMOL/L (ref 20–33)
CREAT SERPL-MCNC: 1.07 MG/DL (ref 0.5–1.4)
EOSINOPHIL # BLD AUTO: 0.24 K/UL (ref 0–0.51)
EOSINOPHIL NFR BLD: 5.4 % (ref 0–6.9)
ERYTHROCYTE [DISTWIDTH] IN BLOOD BY AUTOMATED COUNT: 47.5 FL (ref 35.9–50)
FASTING STATUS PATIENT QL REPORTED: NORMAL
GFR SERPLBLD CREATININE-BSD FMLA CKD-EPI: 66 ML/MIN/1.73 M 2
GLOBULIN SER CALC-MCNC: 2.1 G/DL (ref 1.9–3.5)
GLUCOSE SERPL-MCNC: 94 MG/DL (ref 65–99)
HCT VFR BLD AUTO: 37.9 % (ref 42–52)
HDLC SERPL-MCNC: 65 MG/DL
HGB BLD-MCNC: 12.7 G/DL (ref 14–18)
IMM GRANULOCYTES # BLD AUTO: 0.02 K/UL (ref 0–0.11)
IMM GRANULOCYTES NFR BLD AUTO: 0.5 % (ref 0–0.9)
LDLC SERPL CALC-MCNC: 95 MG/DL
LYMPHOCYTES # BLD AUTO: 1.03 K/UL (ref 1–4.8)
LYMPHOCYTES NFR BLD: 23.3 % (ref 22–41)
MCH RBC QN AUTO: 33.1 PG (ref 27–33)
MCHC RBC AUTO-ENTMCNC: 33.5 G/DL (ref 33.7–35.3)
MCV RBC AUTO: 98.7 FL (ref 81.4–97.8)
MONOCYTES # BLD AUTO: 0.53 K/UL (ref 0–0.85)
MONOCYTES NFR BLD AUTO: 12 % (ref 0–13.4)
NEUTROPHILS # BLD AUTO: 2.56 K/UL (ref 1.82–7.42)
NEUTROPHILS NFR BLD: 57.7 % (ref 44–72)
NRBC # BLD AUTO: 0 K/UL
NRBC BLD-RTO: 0 /100 WBC
PLATELET # BLD AUTO: 162 K/UL (ref 164–446)
PMV BLD AUTO: 9.8 FL (ref 9–12.9)
POTASSIUM SERPL-SCNC: 4.3 MMOL/L (ref 3.6–5.5)
PROT SERPL-MCNC: 6.3 G/DL (ref 6–8.2)
RBC # BLD AUTO: 3.84 M/UL (ref 4.7–6.1)
SODIUM SERPL-SCNC: 145 MMOL/L (ref 135–145)
TRIGL SERPL-MCNC: 59 MG/DL (ref 0–149)
WBC # BLD AUTO: 4.4 K/UL (ref 4.8–10.8)

## 2022-06-30 PROCEDURE — 85025 COMPLETE CBC W/AUTO DIFF WBC: CPT | Mod: GA

## 2022-06-30 PROCEDURE — 80061 LIPID PANEL: CPT

## 2022-06-30 PROCEDURE — 36415 COLL VENOUS BLD VENIPUNCTURE: CPT | Mod: GA

## 2022-06-30 PROCEDURE — 80053 COMPREHEN METABOLIC PANEL: CPT

## 2022-07-19 ENCOUNTER — APPOINTMENT (OUTPATIENT)
Dept: MEDICAL GROUP | Facility: PHYSICIAN GROUP | Age: 87
End: 2022-07-19
Payer: MEDICARE

## 2022-07-27 ENCOUNTER — APPOINTMENT (OUTPATIENT)
Dept: MEDICAL GROUP | Facility: PHYSICIAN GROUP | Age: 87
End: 2022-07-27
Payer: MEDICARE

## 2022-07-28 NOTE — PROGRESS NOTES
"Subjective:     CC:   Chief Complaint   Patient presents with   • Follow-Up         HPI:   Chico Cedillo is an 89-year-old male who presents for routine follow-up visit and to discuss recent blood work results.  He feels well.  No acute medical complaints.  His biggest issue right now is that he mistakenly bought vanilla [place] bars rather than the mint flavored.        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 Samuel   • Urinary incontinence 01/27/2021       Social History     Tobacco Use   • Smoking status: Never Smoker   • Smokeless tobacco: Never Used   Vaping Use   • Vaping Use: Never used   Substance Use Topics   • Alcohol use: No     Alcohol/week: 0.0 oz   • Drug use: No       Current Outpatient Medications Ordered in Epic   Medication Sig Dispense Refill   • Silodosin 4 MG Cap Take 1 Capsule by mouth every day.     • acetaminophen (TYLENOL) 500 MG Tab Tylenol Extra Strength 500 mg tablet    2 tablets every day by oral route.       No current [facility]-ordered facility-administered medications on file.       Allergies:  Patient has no known allergies.    Health Maintenance: Completed    ROS:   Denies any recent fevers or chills. No nausea or vomiting. No chest pains or shortness of breath.    Objective:       Exam:  /60 (BP Location: Right arm, Patient Position: Sitting, BP Cuff Size: Adult)   Pulse 65   Temp 36.7 °C (98.1 °F) (Temporal)   Ht 1.575 m (5' 2\")   Wt 60.5 kg (133 lb 6.4 oz)   SpO2 96%   BMI 24.40 kg/m²  Body mass index is 24.4 kg/m².    Gen: Alert and oriented, No apparent distress.  Lungs: Normal effort, CTA bilaterally, no wheezes, rhonchi, or rales  CV: Regular rate and rhythm. No murmurs, rubs, or gallops.  Ext: No clubbing, cyanosis, edema.        Assessment & Plan:     89 y.o. male with the following -     Pancytopenia (HCC)  Acute " medical condition, new diagnosis.    · Labs from 6/30/2022 showed a reduced WBC count of 4.4 with a normal differential, reduced hemoglobin and hematocrit of 12.7 and 37.9 with a slightly elevated MCV of 98.7, and a reduced platelet count of 162.  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  - CBC WITH DIFFERENTIAL; Future    Prostate cancer (HCC)  Chronic medical condition. Progressive.   · [facility] Dr[facility] Tejeda.  · He was diagnosed in 2012 and completed a course of radiation treatment.  · Patient's recent PSA on 12/7/2021 was 9.28, up from 3.27.   · He was started on Lupron every 3 months 12/2021.   · CT abdomen and pelvis on 11/18/2021 showed no evidence of metastatic disease.   · Nuclear bone scan on 11/18/2021 showed no new suspicious activity.  · Due for next Lupron in October.  · Continue current regimen of silodosin 4 mg daily for LUTS.     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  Progressive.  · The patient is managed by neurology, Dr. Hearn.  · Head CT on 4/20/2022 showed progressive parenchymal volume loss greatest in the temporal lobes supporting the diagnosis of Alzheimer's disease.      Return in about 3 months (around 10/29/2022) for 3-month f/u visit.    Please note that this dictation was created using voice recognition software. I have made every reasonable attempt to correct obvious errors, but I expect that there are errors of grammar and possibly content that I did not discover before finalizing the note.

## 2022-07-29 ENCOUNTER — OFFICE VISIT (OUTPATIENT)
Dept: MEDICAL GROUP | Facility: PHYSICIAN GROUP | Age: 87
End: 2022-07-29
Payer: MEDICARE

## 2022-07-29 VITALS
OXYGEN SATURATION: 96 % | HEART RATE: 65 BPM | DIASTOLIC BLOOD PRESSURE: 60 MMHG | BODY MASS INDEX: 24.55 KG/M2 | HEIGHT: 62 IN | TEMPERATURE: 98.1 F | SYSTOLIC BLOOD PRESSURE: 124 MMHG | WEIGHT: 133.4 LBS

## 2022-07-29 DIAGNOSIS — F02.80 LATE ONSET ALZHEIMER'S DEMENTIA WITHOUT BEHAVIORAL DISTURBANCE (HCC): ICD-10-CM

## 2022-07-29 DIAGNOSIS — C61 PROSTATE CANCER (HCC): ICD-10-CM

## 2022-07-29 DIAGNOSIS — G30.1 LATE ONSET ALZHEIMER'S DEMENTIA WITHOUT BEHAVIORAL DISTURBANCE (HCC): ICD-10-CM

## 2022-07-29 DIAGNOSIS — D61.818 PANCYTOPENIA (HCC): ICD-10-CM

## 2022-07-29 PROCEDURE — 99213 OFFICE O/P EST LOW 20 MIN: CPT | Performed by: INTERNAL MEDICINE

## 2022-07-29 RX ORDER — SILODOSIN 4 MG/1
1 CAPSULE ORAL
COMMUNITY
Start: 2022-07-07 | End: 2023-01-12

## 2022-07-29 ASSESSMENT — FIBROSIS 4 INDEX: FIB4 SCORE: 3.13

## 2022-10-10 ENCOUNTER — HOSPITAL ENCOUNTER (OUTPATIENT)
Dept: LAB | Facility: MEDICAL CENTER | Age: 87
End: 2022-10-10
Attending: PHYSICIAN ASSISTANT
Payer: MEDICARE

## 2022-10-10 LAB — TESTOST SERPL-MCNC: <12 NG/DL (ref 175–781)

## 2022-10-10 PROCEDURE — 36415 COLL VENOUS BLD VENIPUNCTURE: CPT

## 2022-10-10 PROCEDURE — 84403 ASSAY OF TOTAL TESTOSTERONE: CPT

## 2022-10-14 ENCOUNTER — HOSPITAL ENCOUNTER (OUTPATIENT)
Dept: LAB | Facility: MEDICAL CENTER | Age: 87
End: 2022-10-14
Attending: INTERNAL MEDICINE
Payer: MEDICARE

## 2022-10-14 DIAGNOSIS — D61.818 PANCYTOPENIA (HCC): ICD-10-CM

## 2022-10-14 LAB
BASOPHILS # BLD AUTO: 1.2 % (ref 0–1.8)
BASOPHILS # BLD: 0.05 K/UL (ref 0–0.12)
EOSINOPHIL # BLD AUTO: 0.26 K/UL (ref 0–0.51)
EOSINOPHIL NFR BLD: 6 % (ref 0–6.9)
ERYTHROCYTE [DISTWIDTH] IN BLOOD BY AUTOMATED COUNT: 49.8 FL (ref 35.9–50)
HCT VFR BLD AUTO: 39.8 % (ref 42–52)
HGB BLD-MCNC: 13 G/DL (ref 14–18)
IMM GRANULOCYTES # BLD AUTO: 0.01 K/UL (ref 0–0.11)
IMM GRANULOCYTES NFR BLD AUTO: 0.2 % (ref 0–0.9)
LYMPHOCYTES # BLD AUTO: 0.86 K/UL (ref 1–4.8)
LYMPHOCYTES NFR BLD: 19.8 % (ref 22–41)
MCH RBC QN AUTO: 31.9 PG (ref 27–33)
MCHC RBC AUTO-ENTMCNC: 32.7 G/DL (ref 33.7–35.3)
MCV RBC AUTO: 97.8 FL (ref 81.4–97.8)
MONOCYTES # BLD AUTO: 0.54 K/UL (ref 0–0.85)
MONOCYTES NFR BLD AUTO: 12.4 % (ref 0–13.4)
NEUTROPHILS # BLD AUTO: 2.62 K/UL (ref 1.82–7.42)
NEUTROPHILS NFR BLD: 60.4 % (ref 44–72)
NRBC # BLD AUTO: 0 K/UL
NRBC BLD-RTO: 0 /100 WBC
PLATELET # BLD AUTO: 182 K/UL (ref 164–446)
PMV BLD AUTO: 9.9 FL (ref 9–12.9)
RBC # BLD AUTO: 4.07 M/UL (ref 4.7–6.1)
WBC # BLD AUTO: 4.3 K/UL (ref 4.8–10.8)

## 2022-10-14 PROCEDURE — 85025 COMPLETE CBC W/AUTO DIFF WBC: CPT

## 2022-10-14 PROCEDURE — 36415 COLL VENOUS BLD VENIPUNCTURE: CPT

## 2022-10-25 NOTE — PROGRESS NOTES
"Subjective:     CC:   Chief Complaint   Patient presents with    Follow-Up     Rg CHECK UP          HPI:   Chico presents today for routine follow-up visit.  He feels well and has no acute medical complaints.  He is alone for today's visit, typically with his daughter-in-law Natividad.  He would also like to receive his annual influenza vaccine.  We reviewed his labs which have been relatively stable.  He is being managed by urology for prostate cancer and is now on a Lupron drug holiday for 6 months.      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 Samuel    Urinary incontinence 01/27/2021       Social History     Tobacco Use    Smoking status: Never    Smokeless tobacco: Never   Vaping Use    Vaping Use: Never used   Substance Use Topics    Alcohol use: No     Alcohol/week: 0.0 oz    Drug use: No       Current Outpatient Medications Ordered in Epic   Medication Sig Dispense Refill    Silodosin 4 MG Cap Take 1 Capsule by mouth every day.      acetaminophen (TYLENOL) 500 MG Tab Tylenol Extra Strength 500 mg tablet    2 tablets every day by oral route.       No current [facility]-ordered facility-administered medications on file.       Allergies:  Patient has no known allergies.    Health Maintenance: Completed    Review of Systems:  No fevers or chills. No cough, chest pain, or shortness of breath.       Objective:       Exam:  /62 (BP Location: Right arm, Patient Position: Sitting, BP Cuff Size: Adult)   Pulse 73   Temp 36.6 °C (97.8 °F) (Temporal)   Resp 18   Ht 1.575 m (5' 2\")   Wt 60.2 kg (132 lb 11.2 oz)   SpO2 96%   BMI 24.27 kg/m²  Body mass index is 24.27 kg/m².    Gen: Alert and oriented, No apparent distress.  Lungs: Normal effort, CTA bilaterally, no wheezes, rhonchi, or rales  CV: Regular rate and rhythm. No murmurs, rubs, or gallops.  Ext: No clubbing, " cyanosis, edema.      Assessment & Plan:     89 y.o. male with the following -     Pancytopenia (HCC)  Chronic medical condition.  Improved.  Most recent labs from 10/14/2022 showed a reduced WBC count of 4.3 with an ALC of 860, reduced hemoglobin and hematocrit of 13 and 39.8 with a normal MCV of 97.8, and a normal platelet count of 182, up from a previous value of 162. 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  Abdominal ultrasound on 2/10/2022 showed no evidence of hepatomegaly or splenomegaly.  Labs from 3/29/2022 showed a normal folic acid of 22.6, a normal B12 of 292, an elevated MMA of 0.72, I do not see a homocystine available for review.  - FOLATE; Future  - VITAMIN B12; Future  - METHYLMALONIC ACID, SERUM; Future  - HOMOCYSTEINE; Future  - RETICULOCYTES COUNT; Future     Prostate cancer (HCC)  Chronic medical condition. Progressive. The patient is managed by Urology Dr. Ileana Davis.  Diagnosed 2012 s/p XRT.  Now on Lupron every 3 months beginning 12/2021.  Last Lupron injection on 10/17/2022.  Most recent PSA 0.26 on 6/28/2022.  Now on drug holiday x6 months.  Patient to follow-up in 6 months with PSA and testosterone level.  -Continue current regimen of silodosin 4 mg daily for LUTS     Mild late onset Alzheimer's dementia without behavioral disturbance, psychotic disturbance, mood disturbance, or anxiety (HCC)  Chronic medical condition.  Stable. The patient is managed by neurology, Dr. Hearn. Head CT on 4/20/2022 showed progressive parenchymal volume loss greatest in the temporal lobes supporting the diagnosis of Alzheimer's disease.    Encounter for screening for diabetes mellitus  - Comp Metabolic Panel; Future    Screening for cardiovascular condition  - Lipid Profile; Future    Need for vaccination  - Influenza Vaccine, High Dose (65+ Only)      Return in about 6 months (around 4/26/2023) for 6-month f/u visit.    Please note that this dictation was created  using voice recognition software. I have made every reasonable attempt to correct obvious errors, but I expect that there are errors of grammar and possibly content that I did not discover before finalizing the note.

## 2022-10-26 ENCOUNTER — OFFICE VISIT (OUTPATIENT)
Dept: MEDICAL GROUP | Facility: PHYSICIAN GROUP | Age: 87
End: 2022-10-26
Payer: MEDICARE

## 2022-10-26 VITALS
RESPIRATION RATE: 18 BRPM | HEIGHT: 62 IN | TEMPERATURE: 97.8 F | SYSTOLIC BLOOD PRESSURE: 118 MMHG | WEIGHT: 132.7 LBS | DIASTOLIC BLOOD PRESSURE: 62 MMHG | OXYGEN SATURATION: 96 % | BODY MASS INDEX: 24.42 KG/M2 | HEART RATE: 73 BPM

## 2022-10-26 DIAGNOSIS — D61.818 PANCYTOPENIA (HCC): ICD-10-CM

## 2022-10-26 DIAGNOSIS — Z23 NEED FOR VACCINATION: ICD-10-CM

## 2022-10-26 DIAGNOSIS — Z13.1 ENCOUNTER FOR SCREENING FOR DIABETES MELLITUS: ICD-10-CM

## 2022-10-26 DIAGNOSIS — C61 PROSTATE CANCER (HCC): ICD-10-CM

## 2022-10-26 DIAGNOSIS — G30.1 MILD LATE ONSET ALZHEIMER'S DEMENTIA WITHOUT BEHAVIORAL DISTURBANCE, PSYCHOTIC DISTURBANCE, MOOD DISTURBANCE, OR ANXIETY (HCC): ICD-10-CM

## 2022-10-26 DIAGNOSIS — Z13.6 SCREENING FOR CARDIOVASCULAR CONDITION: ICD-10-CM

## 2022-10-26 DIAGNOSIS — F02.A0 MILD LATE ONSET ALZHEIMER'S DEMENTIA WITHOUT BEHAVIORAL DISTURBANCE, PSYCHOTIC DISTURBANCE, MOOD DISTURBANCE, OR ANXIETY (HCC): ICD-10-CM

## 2022-10-26 PROCEDURE — 90662 IIV NO PRSV INCREASED AG IM: CPT | Performed by: INTERNAL MEDICINE

## 2022-10-26 PROCEDURE — G0008 ADMIN INFLUENZA VIRUS VAC: HCPCS | Performed by: INTERNAL MEDICINE

## 2022-10-26 PROCEDURE — 99214 OFFICE O/P EST MOD 30 MIN: CPT | Mod: 25 | Performed by: INTERNAL MEDICINE

## 2022-10-26 ASSESSMENT — FIBROSIS 4 INDEX: FIB4 SCORE: 2.81

## 2022-11-02 ENCOUNTER — PATIENT MESSAGE (OUTPATIENT)
Dept: HEALTH INFORMATION MANAGEMENT | Facility: OTHER | Age: 87
End: 2022-11-02

## 2022-12-12 ENCOUNTER — APPOINTMENT (RX ONLY)
Dept: URBAN - METROPOLITAN AREA CLINIC 22 | Facility: CLINIC | Age: 87
Setting detail: DERMATOLOGY
End: 2022-12-12

## 2022-12-12 DIAGNOSIS — D22 MELANOCYTIC NEVI: ICD-10-CM

## 2022-12-12 DIAGNOSIS — Z85.828 PERSONAL HISTORY OF OTHER MALIGNANT NEOPLASM OF SKIN: ICD-10-CM

## 2022-12-12 DIAGNOSIS — L81.4 OTHER MELANIN HYPERPIGMENTATION: ICD-10-CM

## 2022-12-12 DIAGNOSIS — L82.1 OTHER SEBORRHEIC KERATOSIS: ICD-10-CM

## 2022-12-12 DIAGNOSIS — Z71.89 OTHER SPECIFIED COUNSELING: ICD-10-CM

## 2022-12-12 DIAGNOSIS — L57.0 ACTINIC KERATOSIS: ICD-10-CM

## 2022-12-12 DIAGNOSIS — D18.0 HEMANGIOMA: ICD-10-CM

## 2022-12-12 PROBLEM — D18.01 HEMANGIOMA OF SKIN AND SUBCUTANEOUS TISSUE: Status: ACTIVE | Noted: 2022-12-12

## 2022-12-12 PROBLEM — D22.61 MELANOCYTIC NEVI OF RIGHT UPPER LIMB, INCLUDING SHOULDER: Status: ACTIVE | Noted: 2022-12-12

## 2022-12-12 PROBLEM — D22.62 MELANOCYTIC NEVI OF LEFT UPPER LIMB, INCLUDING SHOULDER: Status: ACTIVE | Noted: 2022-12-12

## 2022-12-12 PROBLEM — D22.5 MELANOCYTIC NEVI OF TRUNK: Status: ACTIVE | Noted: 2022-12-12

## 2022-12-12 PROCEDURE — ? LIQUID NITROGEN

## 2022-12-12 PROCEDURE — ? SUNSCREEN RECOMMENDATIONS

## 2022-12-12 PROCEDURE — ? COUNSELING

## 2022-12-12 PROCEDURE — 99213 OFFICE O/P EST LOW 20 MIN: CPT | Mod: 25

## 2022-12-12 PROCEDURE — 17004 DESTROY PREMAL LESIONS 15/>: CPT

## 2022-12-12 ASSESSMENT — LOCATION SIMPLE DESCRIPTION DERM
LOCATION SIMPLE: LEFT HAND
LOCATION SIMPLE: LEFT CHEEK
LOCATION SIMPLE: RIGHT FOREHEAD
LOCATION SIMPLE: RIGHT HAND
LOCATION SIMPLE: RIGHT CHEEK
LOCATION SIMPLE: RIGHT FOREARM
LOCATION SIMPLE: RIGHT UPPER ARM
LOCATION SIMPLE: NOSE
LOCATION SIMPLE: CHEST
LOCATION SIMPLE: LEFT SCALP
LOCATION SIMPLE: LEFT FOREHEAD
LOCATION SIMPLE: RIGHT LOWER BACK
LOCATION SIMPLE: RIGHT UPPER BACK
LOCATION SIMPLE: LEFT FOREARM
LOCATION SIMPLE: RIGHT EAR
LOCATION SIMPLE: LEFT EAR
LOCATION SIMPLE: ABDOMEN

## 2022-12-12 ASSESSMENT — LOCATION DETAILED DESCRIPTION DERM
LOCATION DETAILED: LEFT MID PREAURICULAR CHEEK
LOCATION DETAILED: LEFT ANTIHELIX
LOCATION DETAILED: LEFT DISTAL DORSAL FOREARM
LOCATION DETAILED: RIGHT SUPERIOR MEDIAL MIDBACK
LOCATION DETAILED: RIGHT DISTAL DORSAL FOREARM
LOCATION DETAILED: RIGHT SUPERIOR MEDIAL FOREHEAD
LOCATION DETAILED: LEFT INCISURA INTERTRAGICA
LOCATION DETAILED: LEFT SUPERIOR FOREHEAD
LOCATION DETAILED: RIGHT RADIAL DORSAL HAND
LOCATION DETAILED: LEFT SUPERIOR HELIX
LOCATION DETAILED: NASAL DORSUM
LOCATION DETAILED: LEFT RADIAL DORSAL HAND
LOCATION DETAILED: LEFT PROXIMAL DORSAL FOREARM
LOCATION DETAILED: RIGHT PROXIMAL POSTERIOR UPPER ARM
LOCATION DETAILED: RIGHT MEDIAL UPPER BACK
LOCATION DETAILED: LEFT LATERAL MALAR CHEEK
LOCATION DETAILED: RIGHT SUPERIOR HELIX
LOCATION DETAILED: LEFT SUPERIOR CRUS OF ANTIHELIX
LOCATION DETAILED: EPIGASTRIC SKIN
LOCATION DETAILED: RIGHT CENTRAL MALAR CHEEK
LOCATION DETAILED: RIGHT INFERIOR HELIX
LOCATION DETAILED: LEFT LATERAL SUPERIOR CHEST
LOCATION DETAILED: RIGHT ANTIHELIX
LOCATION DETAILED: RIGHT PROXIMAL DORSAL FOREARM
LOCATION DETAILED: LEFT MEDIAL FRONTAL SCALP
LOCATION DETAILED: RIGHT SUPERIOR UPPER BACK
LOCATION DETAILED: LEFT FOREHEAD

## 2022-12-12 ASSESSMENT — LOCATION ZONE DERM
LOCATION ZONE: TRUNK
LOCATION ZONE: EAR
LOCATION ZONE: FACE
LOCATION ZONE: HAND
LOCATION ZONE: SCALP
LOCATION ZONE: ARM
LOCATION ZONE: NOSE

## 2022-12-12 NOTE — PROCEDURE: LIQUID NITROGEN
Render Note In Bullet Format When Appropriate: No
Detail Level: Detailed
Number Of Freeze-Thaw Cycles: 2 freeze-thaw cycles
Show Aperture Variable?: Yes
Consent: The patient's consent was obtained including but not limited to risks of crusting, scabbing, blistering, scarring, darker or lighter pigmentary change, recurrence, incomplete removal and infection.
Post-Care Instructions: I reviewed with the patient in detail post-care instructions. Patient is to wear sunprotection, and avoid picking at any of the treated lesions. Pt may apply Vaseline to crusted or scabbing areas.
Duration Of Freeze Thaw-Cycle (Seconds): 3

## 2023-01-12 ENCOUNTER — HOSPITAL ENCOUNTER (OUTPATIENT)
Dept: LAB | Facility: MEDICAL CENTER | Age: 88
End: 2023-01-12
Attending: UROLOGY
Payer: MEDICARE

## 2023-01-12 ENCOUNTER — HOSPITAL ENCOUNTER (EMERGENCY)
Facility: MEDICAL CENTER | Age: 88
End: 2023-01-12
Attending: EMERGENCY MEDICINE
Payer: MEDICARE

## 2023-01-12 ENCOUNTER — OFFICE VISIT (OUTPATIENT)
Dept: URGENT CARE | Facility: PHYSICIAN GROUP | Age: 88
End: 2023-01-12
Payer: MEDICARE

## 2023-01-12 ENCOUNTER — APPOINTMENT (OUTPATIENT)
Dept: RADIOLOGY | Facility: MEDICAL CENTER | Age: 88
End: 2023-01-12
Attending: EMERGENCY MEDICINE
Payer: MEDICARE

## 2023-01-12 VITALS
BODY MASS INDEX: 23.92 KG/M2 | SYSTOLIC BLOOD PRESSURE: 120 MMHG | WEIGHT: 130 LBS | DIASTOLIC BLOOD PRESSURE: 64 MMHG | TEMPERATURE: 97.7 F | HEIGHT: 62 IN | OXYGEN SATURATION: 97 % | HEART RATE: 66 BPM | RESPIRATION RATE: 18 BRPM

## 2023-01-12 VITALS
DIASTOLIC BLOOD PRESSURE: 65 MMHG | HEART RATE: 61 BPM | BODY MASS INDEX: 24.59 KG/M2 | RESPIRATION RATE: 15 BRPM | SYSTOLIC BLOOD PRESSURE: 125 MMHG | WEIGHT: 133.6 LBS | OXYGEN SATURATION: 97 % | TEMPERATURE: 97 F | HEIGHT: 62 IN
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
## 2023-01-12 DIAGNOSIS — S61.411A LACERATION OF RIGHT HAND WITHOUT FOREIGN BODY, INITIAL ENCOUNTER: ICD-10-CM

## 2023-01-12 DIAGNOSIS — Z23 NEED FOR VACCINATION: ICD-10-CM

## 2023-01-12 DIAGNOSIS — S00.81XA ABRASION OF FACE, INITIAL ENCOUNTER: ICD-10-CM

## 2023-01-12 DIAGNOSIS — S01.01XA LACERATION OF SCALP WITHOUT FOREIGN BODY, INITIAL ENCOUNTER: ICD-10-CM

## 2023-01-12 LAB — PSA SERPL-MCNC: 0.25 NG/ML (ref 0–4)

## 2023-01-12 PROCEDURE — 99213 OFFICE O/P EST LOW 20 MIN: CPT | Mod: 25 | Performed by: FAMILY MEDICINE

## 2023-01-12 PROCEDURE — 303353 HCHG DERMABOND SKIN ADHESIVE

## 2023-01-12 PROCEDURE — 70450 CT HEAD/BRAIN W/O DYE: CPT

## 2023-01-12 PROCEDURE — 90715 TDAP VACCINE 7 YRS/> IM: CPT | Performed by: FAMILY MEDICINE

## 2023-01-12 PROCEDURE — 36415 COLL VENOUS BLD VENIPUNCTURE: CPT

## 2023-01-12 PROCEDURE — 90471 IMMUNIZATION ADMIN: CPT | Performed by: FAMILY MEDICINE

## 2023-01-12 PROCEDURE — 304999 HCHG REPAIR-SIMPLE/INTERMED LEVEL 1

## 2023-01-12 PROCEDURE — 84153 ASSAY OF PSA TOTAL: CPT

## 2023-01-12 PROCEDURE — 99284 EMERGENCY DEPT VISIT MOD MDM: CPT

## 2023-01-12 ASSESSMENT — FIBROSIS 4 INDEX
FIB4 SCORE: 2.81
FIB4 SCORE: 2.81

## 2023-01-13 NOTE — PROGRESS NOTES
Subjective:      89 y.o. male presents to urgent care for fall.  Patient reports a mechanical fall earlier this evening outside of his home, he did hit his head and sustained a laceration over his left thigh and left ear.  Also has a laceration to his right thumb.  He denies losing consciousness or vomiting afterwards.  He does endorse not being able to get up immediately, and laid in his driveway for quite some time.  Currently he denies any pain.  Last tetanus was 10/3/2011    He denies any other questions or concerns at this time.    Current problem list, medication, and past medical/surgical history were reviewed in Epic.    ROS  See HPI     Objective:      /64   Pulse 66   Temp 36.5 °C (97.7 °F) (Temporal)   Resp 18   Wt 59 kg (130 lb)   SpO2 97%   BMI 23.78 kg/m²     Physical Exam  Constitutional:       General: He is not in acute distress.     Appearance: He is not diaphoretic.   Cardiovascular:      Rate and Rhythm: Normal rate and regular rhythm.      Heart sounds: Normal heart sounds.   Pulmonary:      Effort: Pulmonary effort is normal. No respiratory distress.      Breath sounds: Normal breath sounds.   Skin:     Comments: Laceration to the base of his left thumb approximately 1 cm in length.  Skin tear to his left ear.  Hematoma noted just above his left eyebrow, this is bleeding.  No visible laceration, rather skin tear.   Neurological:      Mental Status: He is alert. Mental status is at baseline.      Comments: Cranial nerves II through XII grossly intact.  Normal gait.   Psychiatric:         Mood and Affect: Affect normal.         Judgment: Judgment normal.     Assessment/Plan:     1. Fall, initial encounter  2. Laceration of scalp without foreign body, initial encounter  3. Laceration of right hand without foreign body, initial encounter  Tdap updated today in urgent care.  Although clinically fairly stable, [place] head CT score is 1, cannot rule out the need for head imaging.   Unfortunately this is not available to me today.  He does live alone which is worrisome after hitting his head in the fall. At this time, I feel the patient requires a higher level of care in the ED for closer monitoring, stat lab work and/or imaging for further evaluation. This has been discussed with the patient and he states agreement and understanding. The patient is stable without the need for continuous monitoring and therefore will go via private vehicle to [facility] without delay.  - Tdap =>6yo IM      Instructed to return to Urgent Care or nearest Emergency Department if symptoms fail to improve, for any change in condition, further concerns, or new concerning symptoms. Patient states understanding of the plan of care and discharge instructions.    Linda Garcia M.D.

## 2023-01-13 NOTE — ED PROVIDER NOTES
ED Provider Note    CHIEF COMPLAINT  Chief Complaint   Patient presents with    T-5000 GLF     X2 hours ago pt had mechanical GLF outside his home this evening, pt denies LOC, 1in skin tear to L side of head, -deficits or neck pain, Pt AOx4       EXTERNAL RECORDS REVIEWED  Inpatient Notes urgent care notes and Outpatient Notes clinic visits    HPI/ROS  LIMITATION TO HISTORY   Select: : None  OUTSIDE HISTORIAN(S):  Family at bedside    Chico Cedillo is a 89 y.o. male who presents to the emergency department as a TBI.  Mechanical fall roughly 2 hours ago outside where he lost his balance falling to the left.  He did hit his head causing some dermal wounds.  Went to urgent care and then transferred here for higher level of care and imaging.    Currently denies headache.  No neck or back pain.  No other injuries from the fall besides the facial wounds and a small skin tear to the right thumb.    Tetanus updated at urgent care prior to arrival    PAST MEDICAL HISTORY   has a past medical history of Cancer ([facility]) (2011), Cataract (01/27/2021), Diarrhea, Pacemaker, and Urinary incontinence (01/27/2021).    SURGICAL HISTORY   has a past surgical history that includes pacemaker insertion; other; tonsillectomy; and cataract phaco with iol (Right, 2/10/2021).    FAMILY HISTORY  Family History   Problem Relation Age of Onset    Heart Disease Father        SOCIAL HISTORY  Social History     Tobacco Use    Smoking status: Never    Smokeless tobacco: Never   Vaping Use    Vaping Use: Never used   Substance and Sexual Activity    Alcohol use: No     Alcohol/week: 0.0 oz    Drug use: No    Sexual activity: Not Currently       CURRENT MEDICATIONS  Home Medications       Reviewed by José Milian R.N. (Registered Nurse) on 01/12/23 at 1929  Med List Status: Not Addressed     Medication Last Dose Status        Patient Moe Taking any Medications                           ALLERGIES  No Known Allergies    PHYSICAL EXAM  VITAL SIGNS: BP  "125/65   Pulse 61   Temp 36.1 °C (97 °F) (Temporal)   Resp 15   Ht 1.575 m (5' 2\")   Wt 60.6 kg (133 lb 9.6 oz)   SpO2 97%   BMI 24.44 kg/m²        PHYSICAL EXAM  VITAL SIGNS: /65   Pulse 61   Temp 36.1 °C (97 °F) (Temporal)   Resp 15   Ht 1.575 m (5' 2\")   Wt 60.6 kg (133 lb 9.6 oz)   SpO2 97%   BMI 24.44 kg/m²  @SEGUN[[id number]::@   Pulse ox interpretation: I interpret this pulse ox as normal.  Constitutional: Alert in no apparent distress.  HENT: Bilateral external ears normal, Nose normal.  Abrasion to left temple and lateral brow.  Small abrasion to the superior left ear.  Small skin tear to palmar aspect of right thumb between MCP and PIP.  Eyes: Pupils are equal and reactive  Neck: Normal range of motion, No tenderness, Supple  Cardiovascular: Regular rate and rhythm, no murmurs.   Thorax & Lungs: Normal breath sounds, No respiratory distress, No wheezing, No chest tenderness.   Abdomen: Bowel sounds normal, Soft, No tenderness  Skin: Warm, Dry  Back: No bony tenderness  Extremities: Intact distal pulses  Musculoskeletal: Good range of motion in all major joints. No tenderness to palpation or major deformities noted.   Neurologic: Alert , Normal motor function, Normal sensory function, No focal deficits noted.   Psychiatric: Affect normal, Judgment normal, Mood normal.         DIAGNOSTIC STUDIES / PROCEDURES    RADIOLOGY  I have independently interpreted the diagnostic imaging associated with this visit and am waiting the final reading from the radiologist.   CT-HEAD W/O   Final Result      1.  Cerebral atrophy.      2.  White matter lucencies most consistent with small vessel ischemic change versus demyelination or gliosis.      3.  Otherwise, Head CT without contrast with no acute findings. No evidence of acute cerebral infarction, hemorrhage or mass lesion.           Procedure note: All wounds cleansed.  Dermabond applied to right thumb laceration.  Dressings applied to facial " wounds.    COURSE & MEDICAL DECISION MAKING      INITIAL ASSESSMENT AND PLAN  Care Narrative: Code TBI for mechanical fall.  No blood thinners.  Sent from urgent care for head CT.  Tetanus updated prior to arrival.    ADDITIONAL PROBLEM LIST AND DISPOSITION  89-year-old presented emerged part after mechanical fall.  Initially seen in urgent care.  Now with CT imaging completed and wound care completed patient will be discharged home.  Again he did get his tetanus shot at the urgent care.  No need for antibiotics at this time.  Patient in good spirits.  Family supportive.  Will return with any changes or worsening.        Discussion of management with other [facility] or appropriate source(s): Pharmacy for tetanus ; revealed patient had had tetanus shot at urgent care    Escalation of care considered, and ultimately not performed: blood analysis and diagnostic imaging.     Barriers to care at this time, including but not limited to:  Elderly .           FINAL DIAGNOSIS  1. Fall, initial encounter    2. Abrasion of face, initial encounter    3.  Thumb laceration       Electronically signed by: Chidi Sotelo M.D., 1/12/2023 8:01 PM

## 2023-01-13 NOTE — ED TRIAGE NOTES
Chief Complaint   Patient presents with    T-5000 GLF     X2 hours ago pt had mechanical GLF outside his home this evening, pt denies LOC, 1in skin tear to L side of head, -deficits or neck pain, Pt AOx4     Pt and daughter ambulatory to triage for above complaint.      Pt is alert/oriented and follows commands. Pt speaking in full sentences and responds appropriately to questions. No acute distress noted in triage and respirations are even and unlabored.     Charge RN called and TBI protocol activated. Pt and daughter state understanding of POC

## 2023-01-21 NOTE — DOCUMENTATION QUERY
[facility]                                                                       Query Response Note      PATIENT:               JAEL BENJAMIN  ACCT #:                  [id number]  MRN:                     [id number]  :                      1932  ADMIT DATE:       2023 7:37 PM  DISCH DATE:        2023 8:55 PM  RESPONDING  PROVIDER #:        [id number]           QUERY TEXT:    A laceration/wound repair procedure was documented in the Medical Record.    Can you please describe the length of the thumb laceration?    NOTE- If an appropriate response is not listed below, please respond with a new note.    Nate Schober, CCA [email or web address]        The patient's Clinical Indicators include:  Clinical Indicators: Patient presented with laceration to thumb and facial abrasions resulting from a GLF.    Treatment: Thumb laceration was closed with dermabond.    Risk Factors: N/A  Options provided:   -- 2.5 cm or less   -- Greater than 2.5 cm and less than 7.5 cm   -- Greater than 7.5 cm and less than 12.5 cm   -- Greater than 12.5 cm and less than 20.0 cm   -- Greater than 20.0 cm and less than 30.0 cm   -- Greater than 30.0 cm (please respond with a new note documenting the total length)   -- Unable to determine      Query created by: Schober, Nate on 2023 12:04 PM    RESPONSE TEXT:    2.5 cm or less          Electronically signed by:  FAUSTINO STORM MD 2023 10:43 PM

## 2023-02-16 ENCOUNTER — DOCUMENTATION (OUTPATIENT)
Dept: NEUROLOGY | Facility: MEDICAL CENTER | Age: 88
End: 2023-02-16
Payer: MEDICARE

## 2023-02-16 ENCOUNTER — HOSPITAL ENCOUNTER (OUTPATIENT)
Dept: LAB | Facility: MEDICAL CENTER | Age: 88
End: 2023-02-16
Attending: INTERNAL MEDICINE
Payer: MEDICARE

## 2023-02-16 DIAGNOSIS — Z13.6 SCREENING FOR CARDIOVASCULAR CONDITION: ICD-10-CM

## 2023-02-16 DIAGNOSIS — D61.818 PANCYTOPENIA (HCC): ICD-10-CM

## 2023-02-16 DIAGNOSIS — Z13.1 ENCOUNTER FOR SCREENING FOR DIABETES MELLITUS: ICD-10-CM

## 2023-02-16 LAB
ALBUMIN SERPL BCP-MCNC: 4.3 G/DL (ref 3.2–4.9)
ALBUMIN/GLOB SERPL: 2 G/DL
ALP SERPL-CCNC: 65 U/L (ref 30–99)
ALT SERPL-CCNC: 9 U/L (ref 2–50)
ANION GAP SERPL CALC-SCNC: 9 MMOL/L (ref 7–16)
AST SERPL-CCNC: 22 U/L (ref 12–45)
BILIRUB SERPL-MCNC: 0.7 MG/DL (ref 0.1–1.5)
BUN SERPL-MCNC: 19 MG/DL (ref 8–22)
CALCIUM ALBUM COR SERPL-MCNC: 9.2 MG/DL (ref 8.5–10.5)
CALCIUM SERPL-MCNC: 9.4 MG/DL (ref 8.5–10.5)
CHLORIDE SERPL-SCNC: 106 MMOL/L (ref 96–112)
CHOLEST SERPL-MCNC: 184 MG/DL (ref 100–199)
CO2 SERPL-SCNC: 26 MMOL/L (ref 20–33)
CREAT SERPL-MCNC: 1.15 MG/DL (ref 0.5–1.4)
FASTING STATUS PATIENT QL REPORTED: NORMAL
FOLATE SERPL-MCNC: 16.6 NG/ML
GFR SERPLBLD CREATININE-BSD FMLA CKD-EPI: 60 ML/MIN/1.73 M 2
GLOBULIN SER CALC-MCNC: 2.2 G/DL (ref 1.9–3.5)
GLUCOSE SERPL-MCNC: 89 MG/DL (ref 65–99)
HCYS SERPL-SCNC: 19.2 UMOL/L
HDLC SERPL-MCNC: 63 MG/DL
HGB RETIC QN AUTO: 35.8 PG/CELL (ref 29–35)
IMM RETICS NFR: 10 % (ref 9.3–17.4)
LDLC SERPL CALC-MCNC: 105 MG/DL
POTASSIUM SERPL-SCNC: 3.8 MMOL/L (ref 3.6–5.5)
PROT SERPL-MCNC: 6.5 G/DL (ref 6–8.2)
RETICS # AUTO: 0.03 M/UL (ref 0.04–0.06)
RETICS/RBC NFR: 0.8 % (ref 0.8–2.1)
SODIUM SERPL-SCNC: 141 MMOL/L (ref 135–145)
TRIGL SERPL-MCNC: 79 MG/DL (ref 0–149)
VIT B12 SERPL-MCNC: 398 PG/ML (ref 211–911)

## 2023-02-16 PROCEDURE — 82607 VITAMIN B-12: CPT

## 2023-02-16 PROCEDURE — 82746 ASSAY OF FOLIC ACID SERUM: CPT

## 2023-02-16 PROCEDURE — 36415 COLL VENOUS BLD VENIPUNCTURE: CPT

## 2023-02-16 PROCEDURE — 83090 ASSAY OF HOMOCYSTEINE: CPT

## 2023-02-16 PROCEDURE — 85046 RETICYTE/HGB CONCENTRATE: CPT

## 2023-02-16 PROCEDURE — 83921 ORGANIC ACID SINGLE QUANT: CPT

## 2023-02-16 PROCEDURE — 80053 COMPREHEN METABOLIC PANEL: CPT

## 2023-02-16 PROCEDURE — 80061 LIPID PANEL: CPT

## 2023-02-16 NOTE — PROGRESS NOTES
2 Letters just completed by me in Epic  at request of Gwen Mcfarlane (Daughter) given Chico's progressive cognitive and memory decline over the last year regarding activating her to be his Power of  and as to relates to his driving status.

## 2023-02-16 NOTE — LETTER
February 16, 2023        Re:  Chico Cedillo   8/27/1932       To Whom This concerns,      Chico Cedillo has seen me in the Neurology Clinic at [facility]  and has a progressive neurodegenerative dementia condition starting several years ago.    Based on information provided by his daughter (Gwen Mcfarlane) recently, it has become unsafe for him to drive.    It seems reasonable at this time due to Chico's Dementia condition and progression of memory and cognitive decline to activate Gwen as his Power of  as established in his General Power of  dated March 24, 1999.    So I hereby certify that because of reasons of mental deficiency, advanced age and weakness of mind, Chico Cedillo is unable,without assistance, to properly manage his property and/or take care of himself.        Sincerely,                               Jason Hearn M.D.  Electronically Signed

## 2023-02-20 LAB — METHYLMALONATE SERPL-SCNC: 0.22 UMOL/L (ref 0–0.4)

## 2023-02-22 ENCOUNTER — OFFICE VISIT (OUTPATIENT)
Dept: MEDICAL GROUP | Facility: PHYSICIAN GROUP | Age: 88
End: 2023-02-22
Payer: MEDICARE

## 2023-02-22 VITALS
OXYGEN SATURATION: 96 % | DIASTOLIC BLOOD PRESSURE: 58 MMHG | HEIGHT: 62 IN | BODY MASS INDEX: 24.61 KG/M2 | SYSTOLIC BLOOD PRESSURE: 100 MMHG | HEART RATE: 70 BPM | TEMPERATURE: 97.2 F | WEIGHT: 133.7 LBS | RESPIRATION RATE: 16 BRPM

## 2023-02-22 DIAGNOSIS — F02.B0 MODERATE LATE ONSET ALZHEIMER'S DEMENTIA WITHOUT BEHAVIORAL DISTURBANCE, PSYCHOTIC DISTURBANCE, MOOD DISTURBANCE, OR ANXIETY (HCC): ICD-10-CM

## 2023-02-22 DIAGNOSIS — G30.1 MODERATE LATE ONSET ALZHEIMER'S DEMENTIA WITHOUT BEHAVIORAL DISTURBANCE, PSYCHOTIC DISTURBANCE, MOOD DISTURBANCE, OR ANXIETY (HCC): ICD-10-CM

## 2023-02-22 DIAGNOSIS — Z95.0 CARDIAC PACEMAKER IN SITU: ICD-10-CM

## 2023-02-22 DIAGNOSIS — I44.2 HEART BLOCK AV THIRD DEGREE (HCC): ICD-10-CM

## 2023-02-22 DIAGNOSIS — C61 PROSTATE CANCER (HCC): ICD-10-CM

## 2023-02-22 PROCEDURE — 99214 OFFICE O/P EST MOD 30 MIN: CPT | Performed by: INTERNAL MEDICINE

## 2023-02-22 ASSESSMENT — FIBROSIS 4 INDEX: FIB4 SCORE: 3.63

## 2023-02-22 ASSESSMENT — PATIENT HEALTH QUESTIONNAIRE - PHQ9: CLINICAL INTERPRETATION OF PHQ2 SCORE: 0

## 2023-02-22 NOTE — PROGRESS NOTES
Subjective:     CC:   Chief Complaint   Patient presents with    Follow-Up         HPI:   Chico presents today for routine follow-up visit.  He feels well and has no acute medical complaints at today's visit.  His daughter Gwen accompanies him to today's visit. The patient's daughter reports her father continues to have progressive memory loss.  He has also been having frequent falls, most recently on 1/12/2023 where he hit his head.  He was seen in urgent care and head CT was negative for an acute bleed or skull fracture.  He is currently living alone, but the patient's family feels he should be transitioned to an assisted living facility.  He is able to bathe and feed himself, but has difficulty with more advanced daily living activities.  The patient is no longer driving.  His son and daughter have taken over management of his finances.  The patient has a long-term care benefit packet and he and his family have researched and visited a number of assisted living facilities in the area.  They feel [facility] would be the best fit for the patient.       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 Samuel    Urinary incontinence 01/27/2021       Social History     Tobacco Use    Smoking status: Never    Smokeless tobacco: Never   Vaping Use    Vaping Use: Never used   Substance Use Topics    Alcohol use: No     Alcohol/week: 0.0 oz    Drug use: No       Current Outpatient Medications Ordered in Epic   Medication Sig Dispense Refill    mupirocin (BACTROBAN) 2 % Ointment Apply 1 Application topically 2 times a day. 22 g 0    cephALEXin (KEFLEX) 500 MG Cap Take 1 Capsule by mouth 3 times a day for 3 days. 9 Capsule 0     No current Epic-ordered facility-administered medications on file.       Allergies:  Patient has no known allergies.    Health Maintenance:  "Completed    Review of Systems:  Constitutional: Negative for fever, chills.  Respiratory: Negative for cough and shortness of breath.    Cardiovascular: Negative for chest pain or palpitations.  Gastrointestinal: Negative for abdominal pain, nausea, vomiting, constipation, or diarrhea.   Neurological: Negative for headaches, numbness, or tingling.  Psychiatric: Negative for anxiety or depression.      Objective:     Physical exam:  /58 (BP Location: Right arm, Patient Position: Sitting, BP Cuff Size: Adult)   Pulse 70   Temp 36.2 °C (97.2 °F) (Temporal)   Resp 16   Ht 1.575 m (5' 2\")   Wt 60.6 kg (133 lb 11.2 oz)   SpO2 96%   BMI 24.45 kg/m²  Body mass index is 24.45 kg/m².    Constitutional: Well-developed, no acute distress.  HEENT: Pupils are equal, round, and reactive to light.   Neck: No thyromegaly or palpable thyroid nodules. No cervical or supraclavicular lymphadenopathy noted.  Lungs: Clear to auscultation bilaterally. No wheezes, rhonchi, or rales.   Cardiovascular: Regular rate and rhythm, no murmurs noted.   Abdomen: Soft, nontender, nondistended.   Extremities: No edema or erythema.  Psychiatric:  Behavior, mood, and affect are appropriate.      Assessment & Plan:     89 y.o. male with the following -     Moderate late onset Alzheimer's dementia without behavioral disturbance, psychotic disturbance, mood disturbance, or anxiety (HCC)  Chronic medical condition.  Progressive. The patient is managed by neurology, Dr. Hearn. Head CT on 4/20/2022 showed progressive parenchymal volume loss greatest in the temporal lobes supporting the diagnosis of Alzheimer's disease.  The patient's daughter reports her father continues to have progressive memory loss.  He has also been having frequent falls, most recently on 1/12/2023 where he hit his head.  He was seen in urgent care and head CT was negative for an acute bleed or skull fracture.  He is currently living alone, but the patient's family feels he " should be transitioned to an assisted living facility.  He is able to bathe and feed himself, but has difficulty with more advanced daily living activities.  The patient is no longer driving.  His son and daughter have taken over management of his finances.  The patient has a long-term care benefit packet and he and his family have researched and visited a number of assisted living facilities in the area.  They feel [facility] would be the best fit for the patient.  After discussion at today's visit, the patient agrees to a follow-up visit to [facility] and a trial placement at the facility.  The paperwork will be completed when it is available.    Prostate cancer (HCC)  Chronic medical condition.  Stable.  The patient is managed by Dr. Tejeda at [facility]. Diagnosed 2012 s/p XRT.  Last Lupron injection on 10/17/2022, he is currently on a Lupron drug holiday.  Most recent PSA on 1/12/2023 was stable at 0.25.  -Continue surveillance PSA as managed by Urology    Heart block AV third degree (HCC)  Cardiac pacemaker in situ  Chronic medical condition.  Stable.  The patient is managed by cardiology, Dr. Samuel. The patient was diagnosed with high degree AV block 9/2008 and underwent permanent pacemaker placement.  -Continue regular pacemaker interrogations    My total time spent caring for the patient on the day of the encounter was 20 minutes.   This does not include time spent on separately billable procedures/tests.      Return in about 6 months (around 8/22/2023) for 6-month f/u visit.    Please note that this dictation was created using voice recognition software. I have made every reasonable attempt to correct obvious errors, but I expect that there are errors of grammar and possibly content that I did not discover before finalizing the note.

## 2023-02-22 NOTE — LETTER
February 28, 2023         Patient: Chico Cedillo   YOB: 1932   Date of Visit: 2/22/2023         To Whom it May Concern:    This letter is to certify that Chico Cedillo has been under my care since 1/20/2022.  Chico has late onset Alzheimer's dementia with progressive memory loss and cognitive decline.    It is my medical opinion that Chico's General Power of  dated March 24, 1999 designating his daughter Gwen Greenbergclifton as first -in-fact should now be activated due to his progressive cognitive decline resulting in an inability to properly care for himself or manage his finances.    I thereby certify that in my medical opinion, by reason of advanced age, progressive neurodegenerative disease, and weakness of mind, Chico Cedillo is unable, without assistance, to properly manage his property or take care of himself.     If you have any questions or concerns, please don't hesitate to call.      Sincerely,           Gely Rose M.D.  Electronically Signed

## 2023-02-27 ENCOUNTER — OFFICE VISIT (OUTPATIENT)
Dept: URGENT CARE | Facility: PHYSICIAN GROUP | Age: 88
End: 2023-02-27
Payer: MEDICARE

## 2023-02-27 ENCOUNTER — HOSPITAL ENCOUNTER (OUTPATIENT)
Dept: RADIOLOGY | Facility: MEDICAL CENTER | Age: 88
DRG: 536 | End: 2023-02-27
Attending: FAMILY MEDICINE
Payer: MEDICARE

## 2023-02-27 ENCOUNTER — HOSPITAL ENCOUNTER (OUTPATIENT)
Dept: LAB | Facility: MEDICAL CENTER | Age: 88
DRG: 536 | End: 2023-02-27
Attending: INTERNAL MEDICINE
Payer: MEDICARE

## 2023-02-27 VITALS
RESPIRATION RATE: 14 BRPM | HEART RATE: 60 BPM | DIASTOLIC BLOOD PRESSURE: 62 MMHG | OXYGEN SATURATION: 98 % | HEIGHT: 62 IN | TEMPERATURE: 97.2 F | SYSTOLIC BLOOD PRESSURE: 106 MMHG | BODY MASS INDEX: 24.48 KG/M2 | WEIGHT: 133 LBS

## 2023-02-27 DIAGNOSIS — S09.90XA CLOSED HEAD INJURY, INITIAL ENCOUNTER: ICD-10-CM

## 2023-02-27 DIAGNOSIS — S01.81XA FACIAL LACERATION, INITIAL ENCOUNTER: ICD-10-CM
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
## 2023-02-27 DIAGNOSIS — Z11.1 SCREENING-PULMONARY TB: ICD-10-CM

## 2023-02-27 DIAGNOSIS — S00.83XA CONTUSION OF FACE, INITIAL ENCOUNTER: ICD-10-CM

## 2023-02-27 PROCEDURE — 86480 TB TEST CELL IMMUN MEASURE: CPT | Mod: GY

## 2023-02-27 PROCEDURE — 70486 CT MAXILLOFACIAL W/O DYE: CPT

## 2023-02-27 PROCEDURE — 12013 RPR F/E/E/N/L/M 2.6-5.0 CM: CPT | Performed by: FAMILY MEDICINE

## 2023-02-27 PROCEDURE — 36415 COLL VENOUS BLD VENIPUNCTURE: CPT

## 2023-02-27 PROCEDURE — 99213 OFFICE O/P EST LOW 20 MIN: CPT | Mod: 25 | Performed by: FAMILY MEDICINE

## 2023-02-27 PROCEDURE — 70450 CT HEAD/BRAIN W/O DYE: CPT

## 2023-02-27 RX ORDER — CEPHALEXIN 500 MG/1
500 CAPSULE ORAL 3 TIMES DAILY
Qty: 9 CAPSULE | Refills: 0 | Status: ON HOLD | OUTPATIENT
Start: 2023-02-27 | End: 2023-03-08

## 2023-02-27 ASSESSMENT — ENCOUNTER SYMPTOMS: FALLS: 1

## 2023-02-27 ASSESSMENT — FIBROSIS 4 INDEX: FIB4 SCORE: 3.63

## 2023-02-27 NOTE — PROGRESS NOTES
"Subjective     Chico Best is a 89 y.o. male who presents with Fall (Fell about 2 hours ago stepping up on curb after getting out of car, hit L side of face. )      - This is a pleasant and nontoxic appearing 89 y.o. male who has come to the walk-in clinic today for:    #1) ~2 hrs ago had a trip/fall and hit Lt side face into pavement. No loc, says feels fine otherwise and denies pain/injury elsewhere except for some localized pain over Lt cheek bone.  No headache    Seen in ER last month for trip/fall also and had tetanus booster at that time.       ALLERGIES:  Patient has no known allergies.     PMH:  Past Medical History:   Diagnosis Date    Cancer (HCC) 2011    PROSTATE-radiation    Cataract 01/27/2021    bilat-surgery for right eye on 2/10    Diarrhea     Pacemaker     Cardiologist Dr. Samuel    Urinary incontinence 01/27/2021        PSH:  Past Surgical History:   Procedure Laterality Date    CATARACT PHACO WITH IOL Right 2/10/2021    Procedure: PHACOEMULSIFICATION, CATARACT, WITH IOL INSERTION;  Surgeon: Ralph Saldivar M.D.;  Location: SURGERY SAME DAY [facility];  Service: Ophthalmology    OTHER      PACEMAKER INSERTION      TONSILLECTOMY      when he was 4 years old       MEDS:    Current Outpatient Medications:     mupirocin (BACTROBAN) 2 % Ointment, Apply 1 Application topically 2 times a day., Disp: 22 g, Rfl: 0    cephALEXin (KEFLEX) 500 MG Cap, Take 1 Capsule by mouth 3 times a day for 3 days., Disp: 9 Capsule, Rfl: 0    ** I have documented what I find to be significant in regards to past medical, social, family and surgical history  in my HPI or under PMH/PSH/FH review section, otherwise it is noncontributory **         HPI    Review of Systems   Musculoskeletal:  Positive for falls.   Skin:         Cut face   All other systems reviewed and are negative.          Objective     /62   Pulse 60   Temp 36.2 °C (97.2 °F)   Resp 14   Ht 1.575 m (5' 2\")   Wt 60.3 kg (133 lb)   SpO2 98%   BMI " 24.33 kg/m²      Physical Exam  Vitals and nursing note reviewed.   Constitutional:       General: He is not in acute distress.     Appearance: Normal appearance. He is well-developed.   HENT:      Head: Normocephalic.        Comments: Lt side face w/ v shaped flap type lac over zygomatic arch. EOMI, no periorbital TTP  Cardiovascular:      Heart sounds: Normal heart sounds. No murmur heard.  Pulmonary:      Effort: Pulmonary effort is normal. No respiratory distress.   Musculoskeletal:      Cervical back: Normal range of motion and neck supple.   Neurological:      General: No focal deficit present.      Mental Status: He is alert.      Cranial Nerves: No cranial nerve deficit.      Motor: No weakness or abnormal muscle tone.   Psychiatric:         Mood and Affect: Mood normal.         Behavior: Behavior normal.                           Assessment & Plan     1. Fall on same level from slipping, tripping or stumbling, initial encounter  CT-MAXILLOFACIAL W/O PLUS RECONS    CT-HEAD W/O      2. Facial laceration, initial encounter  CT-MAXILLOFACIAL W/O PLUS RECONS    mupirocin (BACTROBAN) 2 % Ointment    cephALEXin (KEFLEX) 500 MG Cap      3. Closed head injury, initial encounter  CT-HEAD W/O      4. Contusion of face, initial encounter  CT-MAXILLOFACIAL W/O PLUS RECONS          - Dx, plan & d/c instructions discussed   - close observation  - Rest, stay hydrated, OTC Tylenol as needed  - head injury and E.R. precautions discussed     Patient left in stable condition     Today's radiology imaging personally reviewed by me today on day of visit and Radiology readings reviewed and discussed w/ patient today.     Procedure: Laceration Repair       - Wound cleaned and/or irrigated w/ NS by MA.  - Clean technique used for procedure   - Local anesthesia with 2% lidocaine  - Closed with #3  6-0 Nylon (1 stitch was a corner stitch) 2 x interrupted sutures with good wound approximation. Care was taken not to disturb/injure  underlying bones, joint, joint-space, tendons, nerves and major vessels   - Polysporin and dressing placed  - Patient tolerated well  - wound care/instructions discussed  - 2 day wound check advised

## 2023-02-28 PROBLEM — I44.2 PRESENCE OF CARDIAC PACEMAKER FOR COMPLETE AV BLOCK (HCC): Status: ACTIVE | Noted: 2023-02-28

## 2023-02-28 PROBLEM — I44.39 HIGH DEGREE ATRIOVENTRICULAR BLOCK: Status: ACTIVE | Noted: 2023-02-28

## 2023-02-28 PROBLEM — Z95.0 PRESENCE OF CARDIAC PACEMAKER FOR COMPLETE AV BLOCK (HCC): Status: ACTIVE | Noted: 2023-02-28

## 2023-02-28 LAB
GAMMA INTERFERON BACKGROUND BLD IA-ACNC: 0.03 IU/ML
M TB IFN-G BLD-IMP: NEGATIVE
M TB IFN-G CD4+ BCKGRND COR BLD-ACNC: 0 IU/ML
MITOGEN IGNF BCKGRD COR BLD-ACNC: >10 IU/ML
QFT TB2 - NIL TBQ2: 0 IU/ML

## 2023-03-01 ENCOUNTER — HOSPITAL ENCOUNTER (INPATIENT)
Facility: MEDICAL CENTER | Age: 88
LOS: 7 days | DRG: 536 | End: 2023-03-08
Attending: EMERGENCY MEDICINE | Admitting: STUDENT IN AN ORGANIZED HEALTH CARE EDUCATION/TRAINING PROGRAM
Payer: MEDICARE

## 2023-03-01 ENCOUNTER — APPOINTMENT (OUTPATIENT)
Dept: RADIOLOGY | Facility: MEDICAL CENTER | Age: 88
DRG: 536 | End: 2023-03-01
Attending: EMERGENCY MEDICINE
Payer: MEDICARE

## 2023-03-01 DIAGNOSIS — S32.501A CLOSED NONDISPLACED FRACTURE OF RIGHT PUBIS, INITIAL ENCOUNTER (HCC): ICD-10-CM

## 2023-03-01 DIAGNOSIS — S32.414A CLOSED NONDISPLACED FRACTURE OF ANTERIOR WALL OF RIGHT ACETABULUM, INITIAL ENCOUNTER (HCC): ICD-10-CM

## 2023-03-01 DIAGNOSIS — R29.6 FREQUENT FALLS: ICD-10-CM

## 2023-03-01 DIAGNOSIS — S32.509A CLOSED NONDISPLACED FRACTURE OF PUBIS, UNSPECIFIED LATERALITY, INITIAL ENCOUNTER (HCC): ICD-10-CM

## 2023-03-01 PROBLEM — Z87.448 HISTORY OF BLADDER STONE: Status: ACTIVE | Noted: 2023-03-01

## 2023-03-01 PROBLEM — Z60.2 LIVING ALONE: Status: ACTIVE | Noted: 2023-03-01

## 2023-03-01 PROBLEM — Z91.81 HISTORY OF FALLING: Status: ACTIVE | Noted: 2023-03-01

## 2023-03-01 PROBLEM — Z87.19 HISTORY OF BILATERAL INGUINAL HERNIAS: Status: ACTIVE | Noted: 2023-03-01

## 2023-03-01 PROBLEM — S01.80XD: Status: ACTIVE | Noted: 2023-03-01

## 2023-03-01 LAB
ALBUMIN SERPL BCP-MCNC: 4 G/DL (ref 3.2–4.9)
ALBUMIN/GLOB SERPL: 1.9 G/DL
ALP SERPL-CCNC: 62 U/L (ref 30–99)
ALT SERPL-CCNC: 12 U/L (ref 2–50)
ANION GAP SERPL CALC-SCNC: 10 MMOL/L (ref 7–16)
APTT PPP: 33.2 SEC (ref 24.7–36)
AST SERPL-CCNC: 22 U/L (ref 12–45)
BASOPHILS # BLD AUTO: 0.6 % (ref 0–1.8)
BASOPHILS # BLD: 0.03 K/UL (ref 0–0.12)
BILIRUB SERPL-MCNC: 0.8 MG/DL (ref 0.1–1.5)
BUN SERPL-MCNC: 18 MG/DL (ref 8–22)
CALCIUM ALBUM COR SERPL-MCNC: 9.2 MG/DL (ref 8.5–10.5)
CALCIUM SERPL-MCNC: 9.2 MG/DL (ref 8.5–10.5)
CHLORIDE SERPL-SCNC: 108 MMOL/L (ref 96–112)
CO2 SERPL-SCNC: 24 MMOL/L (ref 20–33)
CREAT SERPL-MCNC: 0.93 MG/DL (ref 0.5–1.4)
EOSINOPHIL # BLD AUTO: 0.2 K/UL (ref 0–0.51)
EOSINOPHIL NFR BLD: 4 % (ref 0–6.9)
ERYTHROCYTE [DISTWIDTH] IN BLOOD BY AUTOMATED COUNT: 47.8 FL (ref 35.9–50)
GFR SERPLBLD CREATININE-BSD FMLA CKD-EPI: 78 ML/MIN/1.73 M 2
GLOBULIN SER CALC-MCNC: 2.1 G/DL (ref 1.9–3.5)
GLUCOSE SERPL-MCNC: 87 MG/DL (ref 65–99)
HCT VFR BLD AUTO: 35.8 % (ref 42–52)
HGB BLD-MCNC: 12.1 G/DL (ref 14–18)
IMM GRANULOCYTES # BLD AUTO: 0.01 K/UL (ref 0–0.11)
IMM GRANULOCYTES NFR BLD AUTO: 0.2 % (ref 0–0.9)
INR PPP: 1.11 (ref 0.87–1.13)
LYMPHOCYTES # BLD AUTO: 1.07 K/UL (ref 1–4.8)
LYMPHOCYTES NFR BLD: 21.1 % (ref 22–41)
MCH RBC QN AUTO: 31.8 PG (ref 27–33)
MCHC RBC AUTO-ENTMCNC: 33.8 G/DL (ref 33.7–35.3)
MCV RBC AUTO: 94 FL (ref 81.4–97.8)
MONOCYTES # BLD AUTO: 0.63 K/UL (ref 0–0.85)
MONOCYTES NFR BLD AUTO: 12.5 % (ref 0–13.4)
NEUTROPHILS # BLD AUTO: 3.12 K/UL (ref 1.82–7.42)
NEUTROPHILS NFR BLD: 61.6 % (ref 44–72)
NRBC # BLD AUTO: 0 K/UL
NRBC BLD-RTO: 0 /100 WBC
PLATELET # BLD AUTO: 134 K/UL (ref 164–446)
PMV BLD AUTO: 10.1 FL (ref 9–12.9)
POTASSIUM SERPL-SCNC: 4.2 MMOL/L (ref 3.6–5.5)
PROT SERPL-MCNC: 6.1 G/DL (ref 6–8.2)
PROTHROMBIN TIME: 14.2 SEC (ref 12–14.6)
RBC # BLD AUTO: 3.81 M/UL (ref 4.7–6.1)
SODIUM SERPL-SCNC: 142 MMOL/L (ref 135–145)
WBC # BLD AUTO: 5.1 K/UL (ref 4.8–10.8)

## 2023-03-01 PROCEDURE — 700102 HCHG RX REV CODE 250 W/ 637 OVERRIDE(OP): Performed by: EMERGENCY MEDICINE

## 2023-03-01 PROCEDURE — 72192 CT PELVIS W/O DYE: CPT

## 2023-03-01 PROCEDURE — A9270 NON-COVERED ITEM OR SERVICE: HCPCS | Performed by: EMERGENCY MEDICINE

## 2023-03-01 PROCEDURE — 72170 X-RAY EXAM OF PELVIS: CPT

## 2023-03-01 PROCEDURE — 85610 PROTHROMBIN TIME: CPT

## 2023-03-01 PROCEDURE — 770006 HCHG ROOM/CARE - MED/SURG/GYN SEMI*

## 2023-03-01 PROCEDURE — 85025 COMPLETE CBC W/AUTO DIFF WBC: CPT

## 2023-03-01 PROCEDURE — 99222 1ST HOSP IP/OBS MODERATE 55: CPT | Performed by: ORTHOPAEDIC SURGERY

## 2023-03-01 PROCEDURE — 80053 COMPREHEN METABOLIC PANEL: CPT

## 2023-03-01 PROCEDURE — 73552 X-RAY EXAM OF FEMUR 2/>: CPT | Mod: RT

## 2023-03-01 PROCEDURE — 36415 COLL VENOUS BLD VENIPUNCTURE: CPT

## 2023-03-01 PROCEDURE — 85730 THROMBOPLASTIN TIME PARTIAL: CPT

## 2023-03-01 PROCEDURE — 99223 1ST HOSP IP/OBS HIGH 75: CPT | Mod: AI,GC | Performed by: STUDENT IN AN ORGANIZED HEALTH CARE EDUCATION/TRAINING PROGRAM

## 2023-03-01 PROCEDURE — 99285 EMERGENCY DEPT VISIT HI MDM: CPT

## 2023-03-01 RX ORDER — BISACODYL 10 MG
10 SUPPOSITORY, RECTAL RECTAL
Status: DISCONTINUED | OUTPATIENT
Start: 2023-03-01 | End: 2023-03-08 | Stop reason: HOSPADM

## 2023-03-01 RX ORDER — POLYETHYLENE GLYCOL 3350 17 G/17G
1 POWDER, FOR SOLUTION ORAL
Status: DISCONTINUED | OUTPATIENT
Start: 2023-03-01 | End: 2023-03-08 | Stop reason: HOSPADM

## 2023-03-01 RX ORDER — ACETAMINOPHEN 325 MG/1
650 TABLET ORAL EVERY 6 HOURS PRN
Status: DISCONTINUED | OUTPATIENT
Start: 2023-03-01 | End: 2023-03-03

## 2023-03-01 RX ORDER — ENOXAPARIN SODIUM 100 MG/ML
40 INJECTION SUBCUTANEOUS DAILY
Status: DISCONTINUED | OUTPATIENT
Start: 2023-03-02 | End: 2023-03-08 | Stop reason: HOSPADM

## 2023-03-01 RX ORDER — ACETAMINOPHEN 500 MG
1000 TABLET ORAL ONCE
Status: COMPLETED | OUTPATIENT
Start: 2023-03-01 | End: 2023-03-01

## 2023-03-01 RX ORDER — ACETAMINOPHEN 500 MG
1000 TABLET ORAL 2 TIMES DAILY PRN
COMMUNITY
End: 2023-04-05

## 2023-03-01 RX ORDER — AMOXICILLIN 250 MG
2 CAPSULE ORAL 2 TIMES DAILY
Status: DISCONTINUED | OUTPATIENT
Start: 2023-03-02 | End: 2023-03-08 | Stop reason: HOSPADM

## 2023-03-01 RX ADMIN — ACETAMINOPHEN 1000 MG: 500 TABLET, FILM COATED ORAL at 14:00

## 2023-03-01 ASSESSMENT — ENCOUNTER SYMPTOMS
COUGH: 0
FOCAL WEAKNESS: 0
BRUISES/BLEEDS EASILY: 0
DEPRESSION: 0
HEADACHES: 0
SPEECH CHANGE: 0
SENSORY CHANGE: 0
VOMITING: 0
SORE THROAT: 0
FEVER: 0
NECK PAIN: 0
SHORTNESS OF BREATH: 0
DIZZINESS: 0
ABDOMINAL PAIN: 0
WHEEZING: 0
LOSS OF CONSCIOUSNESS: 0
HEARTBURN: 0
FALLS: 1
FLANK PAIN: 0
MYALGIAS: 0
NAUSEA: 0
PHOTOPHOBIA: 0
CHILLS: 0
BLURRED VISION: 0
WEAKNESS: 0
DOUBLE VISION: 0
BLOOD IN STOOL: 0
PALPITATIONS: 0

## 2023-03-01 ASSESSMENT — FIBROSIS 4 INDEX: FIB4 SCORE: 3.63

## 2023-03-01 ASSESSMENT — LIFESTYLE VARIABLES: SUBSTANCE_ABUSE: 0

## 2023-03-01 ASSESSMENT — PAIN DESCRIPTION - PAIN TYPE: TYPE: ACUTE PAIN

## 2023-03-01 NOTE — ED TRIAGE NOTES
"Chief Complaint   Patient presents with    Leg Pain     Pt states he fell Monday and hit is head. He then fell again on Tuesday and hurt his R leg. Today pt endorses worsening R thigh and groin pain. CMS intact     /63   Pulse 78   Temp 36.4 °C (97.5 °F) (Temporal)   Resp 16   Ht 1.575 m (5' 2\")   Wt 60.3 kg (133 lb)   SpO2 98%   BMI 24.33 kg/m²     "

## 2023-03-01 NOTE — DISCHARGE PLANNING
EVIE was asked by the medical provider to check on the patient's DC plan. EVIE spoke with Helga (dtr-in-law) on the phone. She reported that the DC plan is for the patient to return home. His dtr Gwen will be residing with the patient until next Tuesday. EVIE met with Gwen who was visiting with the patient. She confirmed that she will reside with the patient for about a week. Afterwards he will be able to move into [facility] on Tuesday. [facility] only accepts new admissions Tuesday, Wednesday or Thursdays. EVIE informed the medical provider of the plan. The dtr's contact information is Gwen Mcfarlane ([phone])

## 2023-03-01 NOTE — ED PROVIDER NOTES
"ED Provider Note    CHIEF COMPLAINT  Chief Complaint   Patient presents with    Leg Pain     Pt states he fell Monday and hit is head. He then fell again on Tuesday and hurt his R leg. Today pt endorses worsening R thigh and groin pain. CMS intact     EXTERNAL RECORDS REVIEWED  Review of records show that patient was seen at Urgent Care four days ago after a fall. He had a CT head and face done, which was unremarkable. He was also seen by his PCP on 2/22/23 to talk about possible assisted living placement. He has a history of Alzheimer's and falls. 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 Not on any anticoagulation at this time.     HPI/ROS  LIMITATION TO HISTORY   Select: : None  OUTSIDE HISTORIAN(S):  None    Chico Cedillo is a 89 y.o. male with a history of Alzheimer's dementia and multiple falls who presents to the Emergency Department for evaluation of acute right-sided leg pain onset yesterday. Per daughter, patient had a fall two days ago and he hit his head. He again fell \"unexplained at home\" the following day. Denies any loss of consciousness. Denies any head trauma for this second fall. EMS was called, and evaluated the patient. Paramedics encouraged patient to come to the ED. Patient chose not to go at that time. Since then, he has been experiencing right-sided groin pain that radiates down to his right thigh. Patient has been having a hard time walking on his own, which he normally can do. Daughter was concerned, prompting her to present to the ED for further evaluation. Denies any head pain or hip pain. No alleviating factors reported. Denies use of anticoagulants at this time. No known drug allergies.     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 Samuel    Urinary incontinence 01/27/2021        SURGICAL HISTORY  Past " "Surgical History:   Procedure Laterality Date    CATARACT PHACO WITH IOL Right 2/10/2021    Procedure: PHACOEMULSIFICATION, CATARACT, WITH IOL INSERTION;  Surgeon: Ralph Saldivar M.D.;  Location: SURGERY SAME DAY [facility];  Service: Ophthalmology    OTHER      PACEMAKER INSERTION      TONSILLECTOMY      when he was 4 years old        FAMILY HISTORY  Family History   Problem Relation Age of Onset    Heart Disease Father        SOCIAL HISTORY   reports that he has never smoked. He has never used smokeless tobacco. He reports that he does not drink alcohol and does not use drugs.    CURRENT MEDICATIONS  Previous Medications    ACETAMINOPHEN (TYLENOL) 500 MG TAB    Take 1,000 mg by mouth 2 times a day as needed for Mild Pain.    CEPHALEXIN (KEFLEX) 500 MG CAP    Take 1 Capsule by mouth 3 times a day for 3 days.    MUPIROCIN (BACTROBAN) 2 % OINTMENT    Apply 1 Application topically 2 times a day.       ALLERGIES  Patient has no known allergies.    PHYSICAL EXAM  /63   Pulse 78   Temp 36.4 °C (97.5 °F) (Temporal)   Resp 16   Ht 1.575 m (5' 2\")   Wt 60.3 kg (133 lb)   SpO2 98%      Constitutional: Nontoxic appearing. Alert in no apparent distress.  HENT: Normocephalic, Atraumatic. Bilateral external ears normal. Nose normal.  Moist mucous membranes.    Neck: Supple, full range of motion  Heart: Regular rate and rhythm.  No murmurs.    Lungs: No respiratory distress, normal work of breathing. Lungs clear to auscultation bilaterally.  Abdomen Soft, no distention.  No tenderness to palpation.  Musculoskeletal: Atraumatic. No obvious deformities noted.  Decent range of motion of the right hip without exquisite pain. No pain with range of motion of the right knee. Intact distal pulses. No lower extremity edema.  Skin: Warm, Dry.  No erythema, No rash.   Neurologic: Alert and oriented x3. Moving all extremities spontaneously without focal deficits.  Psychiatric: Affect normal, Mood normal, Appears appropriate and " not intoxicated.      DIAGNOSTIC STUDIES / PROCEDURES    LABS  Labs Reviewed   CBC WITH DIFFERENTIAL - Abnormal; Notable for the following components:       Result Value    RBC 3.81 (*)     Hemoglobin 12.1 (*)     Hematocrit 35.8 (*)     Platelet Count 134 (*)     Lymphocytes 21.10 (*)     All other components within normal limits    Narrative:     Indicate which anticoagulants the patient is on:->UNKNOWN   COMP METABOLIC PANEL    Narrative:     Indicate which anticoagulants the patient is on:->UNKNOWN   PROTHROMBIN TIME    Narrative:     Indicate which anticoagulants the patient is on:->UNKNOWN   APTT    Narrative:     Indicate which anticoagulants the patient is on:->UNKNOWN   CORRECTED CALCIUM    Narrative:     Indicate which anticoagulants the patient is on:->UNKNOWN   ESTIMATED GFR    Narrative:     Indicate which anticoagulants the patient is on:->UNKNOWN         RADIOLOGY  I have independently interpreted the diagnostic imaging associated with this visit and am waiting the final reading from the radiologist.   My preliminary interpretation is a follows: no fracture on xray  Radiologist interpretation:   CT-PELVIS W/O PLUS RECONS   Final Result      1.  Acute on chronic right inferior pubic ramus fracture.      2.  Apparent acute nondisplaced right superior pubic ramus fracture/fracture the anterior column of the acetabulum.      3.  Bladder stones.      4.  Bilateral inguinal hernias, right greater than left.      5.  Diverticulosis without diverticulitis      DX-PELVIS-1 OR 2 VIEWS   Final Result      1.  No evidence of pelvic fracture or dislocation.      2.  Ovoid calcification superior to the pubic symphysis which may represent a calcified lymph node or a bladder stone or prostatic calcification.      DX-FEMUR-2+ RIGHT   Final Result      Negative femur series.            COURSE & MEDICAL DECISION MAKING  1:06 PM - Patient was evaluated at bedside. Daughter at bedside. After my exam, I explained to the  patient the plan of care, which includes obtaining lab work for further evaluation. Patient understands and verbalizes agreement to plan of care. Ordered for CT-pelvis without, DX-femur right, and DX-pelvis for further evaluation.     1:47 PM - Patient will be treated with Tylenol 1000 mg for his symptoms.     ED Observation Status? Yes; I am placing the patient in to an observation status due to a diagnostic uncertainty as well as therapeutic intensity. Patient placed in observation status at 1:15 PM, 3/1/2023.     Observation plan is as follows: imaging and then reassess     Upon Reevaluation, the patient's condition has: not improved; and will be escalated to hospitalization.    Patient discharged from ED Observation status at 5:30 (Time) 3/1/23 (Date).     INITIAL ASSESSMENT, COURSE AND PLAN  Care Narrative: Elderly patient with history of dementia and frequent falls who presents with right-sided hip pain after a fall that occurred yesterday.  He is alert with normal vital signs on arrival.  He has evidence of an old healing bruise and abrasions to the face that was from his last fall a few days ago.  He had negative imaging at urgent care for this.  No concern for any new head trauma.  He has no evidence of neurovascular compromise.  X-rays were initially negative however CT showed inferior ramus and acetabular fracture that were nondisplaced.  Labs are reassuring without infectious process, signs of dehydration or electrolyte abnormality.  Plan to admit for orthopedic consultation and physical therapy and likely placement.    5:30 PM - Patient was reevaluated at bedside. Discussed radiology results with the patient and informed him of the plan for admission.  Patient is hesitant for admission however after long discussion with him and his daughter, this is likely the most safe thing for him as he is now ended up in the hospital twice this week from falls.  Patient understands and verbalizes agreement to plan  of care.     ADDITIONAL PROBLEM LIST  Problem #1: Right pubic ramus fracture -consulted with orthopedic surgery, will admit for pain control and physical therapy      DISPOSITION AND DISCUSSIONS  I have discussed management of the patient with the following physicians and SUGEY's:    6:02 PM - Ortho responded. I discussed the patient's case and the above findings with Dr. Felton (Ortho) who agrees to evaluate the patient's case.     6:20 PM - Hospitalist responded. I discussed the patient's case and the above findings with [facility] who agrees to evaluate the patient for hospitalization.    Discussion of management with other QHP or appropriate source(s): Case management regarding future placement      Patient will be hospitalized by [facility] in guarded condition    FINAL DIAGNOSIS  1. Closed nondisplaced fracture of anterior wall of right acetabulum, initial encounter ([facility])    2. Frequent falls        The note accurately reflects work and decisions made by me.  Danyell Sam M.D.  3/1/2023  10:25 PM     Melany DAMON (Matt), am scribing for, and in the presence of, Danyell Sam M.D..    Electronically signed by: Melany Hong (Matt), 3/1/2023    Danyell DAMON M.D. personally performed the services described in this documentation, as scribed by Melany Hong in my presence, and it is both accurate and complete.

## 2023-03-02 PROBLEM — K40.20 BILATERAL INGUINAL HERNIA: Status: ACTIVE | Noted: 2023-03-02

## 2023-03-02 PROBLEM — G93.40 ACUTE ENCEPHALOPATHY: Status: ACTIVE | Noted: 2023-03-02

## 2023-03-02 LAB
ALBUMIN SERPL BCP-MCNC: 3.9 G/DL (ref 3.2–4.9)
ALBUMIN/GLOB SERPL: 2 G/DL
ALP SERPL-CCNC: 65 U/L (ref 30–99)
ALT SERPL-CCNC: 8 U/L (ref 2–50)
ANION GAP SERPL CALC-SCNC: 11 MMOL/L (ref 7–16)
AST SERPL-CCNC: 18 U/L (ref 12–45)
BASOPHILS # BLD AUTO: 0.6 % (ref 0–1.8)
BASOPHILS # BLD: 0.03 K/UL (ref 0–0.12)
BILIRUB SERPL-MCNC: 1 MG/DL (ref 0.1–1.5)
BUN SERPL-MCNC: 18 MG/DL (ref 8–22)
CALCIUM ALBUM COR SERPL-MCNC: 9.3 MG/DL (ref 8.5–10.5)
CALCIUM SERPL-MCNC: 9.2 MG/DL (ref 8.5–10.5)
CHLORIDE SERPL-SCNC: 108 MMOL/L (ref 96–112)
CO2 SERPL-SCNC: 23 MMOL/L (ref 20–33)
CREAT SERPL-MCNC: 0.94 MG/DL (ref 0.5–1.4)
EOSINOPHIL # BLD AUTO: 0.23 K/UL (ref 0–0.51)
EOSINOPHIL NFR BLD: 4.4 % (ref 0–6.9)
ERYTHROCYTE [DISTWIDTH] IN BLOOD BY AUTOMATED COUNT: 48.1 FL (ref 35.9–50)
GFR SERPLBLD CREATININE-BSD FMLA CKD-EPI: 77 ML/MIN/1.73 M 2
GLOBULIN SER CALC-MCNC: 2 G/DL (ref 1.9–3.5)
GLUCOSE SERPL-MCNC: 76 MG/DL (ref 65–99)
HCT VFR BLD AUTO: 36.9 % (ref 42–52)
HGB BLD-MCNC: 12.4 G/DL (ref 14–18)
IMM GRANULOCYTES # BLD AUTO: 0.03 K/UL (ref 0–0.11)
IMM GRANULOCYTES NFR BLD AUTO: 0.6 % (ref 0–0.9)
LYMPHOCYTES # BLD AUTO: 0.98 K/UL (ref 1–4.8)
LYMPHOCYTES NFR BLD: 18.6 % (ref 22–41)
MCH RBC QN AUTO: 32.2 PG (ref 27–33)
MCHC RBC AUTO-ENTMCNC: 33.6 G/DL (ref 33.7–35.3)
MCV RBC AUTO: 95.8 FL (ref 81.4–97.8)
MONOCYTES # BLD AUTO: 0.55 K/UL (ref 0–0.85)
MONOCYTES NFR BLD AUTO: 10.4 % (ref 0–13.4)
NEUTROPHILS # BLD AUTO: 3.46 K/UL (ref 1.82–7.42)
NEUTROPHILS NFR BLD: 65.4 % (ref 44–72)
NRBC # BLD AUTO: 0 K/UL
NRBC BLD-RTO: 0 /100 WBC
PLATELET # BLD AUTO: 142 K/UL (ref 164–446)
PMV BLD AUTO: 9.9 FL (ref 9–12.9)
POTASSIUM SERPL-SCNC: 3.9 MMOL/L (ref 3.6–5.5)
PROT SERPL-MCNC: 5.9 G/DL (ref 6–8.2)
RBC # BLD AUTO: 3.85 M/UL (ref 4.7–6.1)
SODIUM SERPL-SCNC: 142 MMOL/L (ref 135–145)
WBC # BLD AUTO: 5.3 K/UL (ref 4.8–10.8)

## 2023-03-02 PROCEDURE — 770001 HCHG ROOM/CARE - MED/SURG/GYN PRIV*

## 2023-03-02 PROCEDURE — 85025 COMPLETE CBC W/AUTO DIFF WBC: CPT

## 2023-03-02 PROCEDURE — 99232 SBSQ HOSP IP/OBS MODERATE 35: CPT | Performed by: STUDENT IN AN ORGANIZED HEALTH CARE EDUCATION/TRAINING PROGRAM

## 2023-03-02 PROCEDURE — 700111 HCHG RX REV CODE 636 W/ 250 OVERRIDE (IP): Performed by: STUDENT IN AN ORGANIZED HEALTH CARE EDUCATION/TRAINING PROGRAM

## 2023-03-02 PROCEDURE — 80053 COMPREHEN METABOLIC PANEL: CPT

## 2023-03-02 RX ADMIN — ENOXAPARIN SODIUM 40 MG: 40 INJECTION SUBCUTANEOUS at 17:07

## 2023-03-02 ASSESSMENT — COGNITIVE AND FUNCTIONAL STATUS - GENERAL
TURNING FROM BACK TO SIDE WHILE IN FLAT BAD: A LOT
DRESSING REGULAR LOWER BODY CLOTHING: TOTAL
SUGGESTED CMS G CODE MODIFIER MOBILITY: CM
MOVING FROM LYING ON BACK TO SITTING ON SIDE OF FLAT BED: UNABLE
WALKING IN HOSPITAL ROOM: A LOT
SUGGESTED CMS G CODE MODIFIER DAILY ACTIVITY: CK
CLIMB 3 TO 5 STEPS WITH RAILING: TOTAL
DRESSING REGULAR UPPER BODY CLOTHING: A LITTLE
MOBILITY SCORE: 8
STANDING UP FROM CHAIR USING ARMS: TOTAL
TOILETING: A LITTLE
MOVING TO AND FROM BED TO CHAIR: UNABLE
DAILY ACTIVITIY SCORE: 17
HELP NEEDED FOR BATHING: A LOT

## 2023-03-02 ASSESSMENT — LIFESTYLE VARIABLES
EVER FELT BAD OR GUILTY ABOUT YOUR DRINKING: NO
EVER HAD A DRINK FIRST THING IN THE MORNING TO STEADY YOUR NERVES TO GET RID OF A HANGOVER: NO
HAVE PEOPLE ANNOYED YOU BY CRITICIZING YOUR DRINKING: NO
TOTAL SCORE: 0
HAVE YOU EVER FELT YOU SHOULD CUT DOWN ON YOUR DRINKING: NO
AVERAGE NUMBER OF DAYS PER WEEK YOU HAVE A DRINK CONTAINING ALCOHOL: 0
ALCOHOL_USE: NO
HOW MANY TIMES IN THE PAST YEAR HAVE YOU HAD 5 OR MORE DRINKS IN A DAY: 0
TOTAL SCORE: 0
TOTAL SCORE: 0
ON A TYPICAL DAY WHEN YOU DRINK ALCOHOL HOW MANY DRINKS DO YOU HAVE: 0
CONSUMPTION TOTAL: NEGATIVE

## 2023-03-02 ASSESSMENT — PATIENT HEALTH QUESTIONNAIRE - PHQ9
SUM OF ALL RESPONSES TO PHQ9 QUESTIONS 1 AND 2: 0
2. FEELING DOWN, DEPRESSED, IRRITABLE, OR HOPELESS: NOT AT ALL
1. LITTLE INTEREST OR PLEASURE IN DOING THINGS: NOT AT ALL

## 2023-03-02 ASSESSMENT — PAIN DESCRIPTION - PAIN TYPE
TYPE: ACUTE PAIN

## 2023-03-02 ASSESSMENT — FIBROSIS 4 INDEX
FIB4 SCORE: 4.03
FIB4 SCORE: 4.03

## 2023-03-02 NOTE — PROGRESS NOTES
During rounding, patient was out of the gurney and sitting in the chair. Patient refused to go back to bed and put back his hospital gown. Patient appears to be more confused, disoriented to place, time and situation. Patient agreed to go back to bed with x2 assist. Bed alarm on. Call light within reach.

## 2023-03-02 NOTE — PROGRESS NOTES
Hospital Medicine Daily Progress Note    Date of Service  3/2/2023    Chief Complaint  Chico Cedillo is a 89 y.o. male admitted 3/1/2023 with wounds and groin pain    Hospital Course  89-year-old male with dementia who lives alone and has frequent falls sent in by family members due to wounds and groin pain.  CT pelvis showed Apparent acute nondisplaced right superior pubic ramus fracture/fracture the anterior column of the acetabulum.; Acute on chronic right inferior pubic ramus fracture.     orthopedics consulted, nonsurgical.      Interval Problem Update  Patient was seen and examined at bedside  Care plan was discussed and updated with the daughter at the bedside    AO x1.  Episodes of confusion.  He was AO x3 earlier per the daughter.    Patient denies pain, nausea vomiting     Per the daughter, patient has been independently of ADLs until recently.  The daughter has been with the patient for past 3 weeks.  Assisted living has been arranged and will start next Tuesday.    Pending PT OT, daughter lean to jail with HH.     I have discussed this patient's plan of care and discharge plan at IDT rounds today with Case Management, Nursing, Nursing leadership, and other members of the IDT team.    Consultants/Specialty  orthopedics    Code Status  DNAR/DNI    Disposition  Patient is not medically cleared for discharge.   Anticipate discharge to to home with organized home healthcare and close outpatient follow-up.  I have placed the appropriate orders for post-discharge needs.    Review of Systems  Review of Systems   Unable to perform ROS: Mental status change      Physical Exam  Temp:  [35.9 °C (96.7 °F)-36.7 °C (98.1 °F)] 35.9 °C (96.7 °F)  Pulse:  [60-77] 61  Resp:  [16-18] 17  BP: (130-160)/(66-77) 140/67  SpO2:  [92 %-98 %] 96 %    Physical Exam  Constitutional:       Appearance: He is ill-appearing.   HENT:      Head: Normocephalic.      Nose: Nose normal.   Eyes:      Extraocular Movements: Extraocular movements  intact.      Conjunctiva/sclera: Conjunctivae normal.   Cardiovascular:      Rate and Rhythm: Normal rate and regular rhythm.      Pulses: Normal pulses.      Heart sounds: Normal heart sounds.   Pulmonary:      Effort: Pulmonary effort is normal.      Breath sounds: Normal breath sounds. No wheezing or rales.   Abdominal:      General: Bowel sounds are normal.      Palpations: Abdomen is soft.      Tenderness: There is no abdominal tenderness.   Musculoskeletal:         General: Tenderness present. No swelling.      Cervical back: Normal range of motion.   Skin:     Findings: Bruising present.   Neurological:      Mental Status: He is alert. He is disoriented.   Psychiatric:         Mood and Affect: Mood normal.       Fluids    Intake/Output Summary (Last 24 hours) at 3/2/2023 1511  Last data filed at 3/2/2023 1400  Gross per 24 hour   Intake 200 ml   Output 350 ml   Net -150 ml       Laboratory  Recent Labs     03/01/23  1848 03/02/23  0120   WBC 5.1 5.3   RBC 3.81* 3.85*   HEMOGLOBIN 12.1* 12.4*   HEMATOCRIT 35.8* 36.9*   MCV 94.0 95.8   MCH 31.8 32.2   MCHC 33.8 33.6*   RDW 47.8 48.1   PLATELETCT 134* 142*   MPV 10.1 9.9     Recent Labs     03/01/23  1848 03/02/23  0120   SODIUM 142 142   POTASSIUM 4.2 3.9   CHLORIDE 108 108   CO2 24 23   GLUCOSE 87 76   BUN 18 18   CREATININE 0.93 0.94   CALCIUM 9.2 9.2     Recent Labs     03/01/23 1848   APTT 33.2   INR 1.11               Imaging  CT-PELVIS W/O PLUS RECONS   Final Result      1.  Acute on chronic right inferior pubic ramus fracture.      2.  Apparent acute nondisplaced right superior pubic ramus fracture/fracture the anterior column of the acetabulum.      3.  Bladder stones.      4.  Bilateral inguinal hernias, right greater than left.      5.  Diverticulosis without diverticulitis      DX-PELVIS-1 OR 2 VIEWS   Final Result      1.  No evidence of pelvic fracture or dislocation.      2.  Ovoid calcification superior to the pubic symphysis which may represent  a calcified lymph node or a bladder stone or prostatic calcification.      DX-FEMUR-2+ RIGHT   Final Result      Negative femur series.           Assessment/Plan  * Pubic Rami Fracture- (present on admission)  Assessment & Plan  Noted on CT  -Right pubic rami fracture    orthopedics consulted, nonsurgical.     Ordered vitamin D  PT OT  Pain control    Acute encephalopathy  Assessment & Plan  AO x1.  Episodes of confusion.  He was AO x3 earlier per the daughter.  Likely due to underlying dementia, delirium    Interventions to be considered in all patients in order to minimize the risk of delirium.     -do not disturb patient (vitals or lab draws) between the hours of 10 PM and 6 AM.  -ideally the patient should not sleep during the day and we should avoid day time naps.   -up in chair for meals  -ambulate at least three times daily, as able  -watch for constipation  -timed voiding - ask patient is she would like to go to the bathroom q 2-3 hours, except during the do not disturb hours.   -remove all necessary lines (central lines, peripheral IVs, feeding tubes, leon catheters)  -unless patient has shown harm to self or others I would recommend against use of restraints - either chemical or physical (antipsychotics)   -minimize polypharmacy, do not dose medication during sleep hours      Bilateral inguinal hernia  Assessment & Plan  CT showed Bilateral inguinal hernias, right greater than left.   Stable    Outpatient f/u    History of bladder stone  Assessment & Plan  -noted on CT imaging    Living alone  Assessment & Plan  -lives alone  -patient family previously working on group home placement    History of bilateral inguinal hernias- (present on admission)  Assessment & Plan  -noted on CT imaging  -low clinical concern for incarceration at this time    Open facial wound, subsequent encounter- (present on admission)  Assessment & Plan  -facial wound sustained on 2/27/23 after fall  -received facial stitches on  2/27/23  PLAN  -continue to monitor  -consideration for stiches removal on 3/6/23, 7 days after placement    History of falling- (present on admission)  Assessment & Plan  -family noting multiple falls at home  -falls have been reported as mechanical; tripping on curbs, denies any symptoms prior to falls, no loc  -Imaging notes nondisplaced right superior pubic ramus fracture and fracture of anterior column of acetabulum  -Ortho consulted in the ED  PLAN  -PT / OT consults  -Appreciate Ortho recs  -Tylenol PRN for pain control    Late onset Alzheimer dementia (HCC)- (present on admission)  Assessment & Plan  -history of         VTE prophylaxis: enoxaparin ppx    I have performed a physical exam and reviewed and updated ROS and Plan today (3/2/2023). In review of yesterday's note (3/1/2023), there are no changes except as documented above.

## 2023-03-02 NOTE — PROGRESS NOTES
4 Eyes Skin Assessment Completed by MANDO Triplett and MANDO Martinez.    Head Bruising  Ears WDL  Nose WDL  Mouth WDL  Neck WDL  Breast/Chest WDL  Shoulder Blades WDL  Spine WDL  (R) Arm/Elbow/Hand Redness, Blanching, and Abrasion  (L) Arm/Elbow/Hand Redness and Blanching  Abdomen WDL  Groin WDL  Scrotum/Coccyx/Buttocks WDL  (R) Leg Abrasion knee  (L) Leg Abrasion knee  (R) Heel/Foot/Toe Redness and Blanching  (L) Heel/Foot/Toe Redness and Blanching          Interventions In Place Pillows and Heels Loaded W/Pillows    Possible Skin Injury No    Pictures Uploaded Into Epic No, needs to be completed  Wound Consult Placed N/A  RN Wound Prevention Protocol Ordered No

## 2023-03-02 NOTE — ASSESSMENT & PLAN NOTE
Avoid Benzodiazepines and Anticholinergics  Frequent orientation  Open window blinds during the day  Avoid naps during the day  Family at bedside whenever possible  Ensure adequate sleep at night - use Melatonin preferably  Avoid early morning labs  Avoid vital signs during sleep  Ambulate if possible  Provide adequate pain control  Monitor for urinary retention  Prevent and manage constipation  Discontinue IVs, Catheters, Tubes, Lines, Cardiac monitors, SCDs if possible

## 2023-03-02 NOTE — ED NOTES
Moved pt to Room 76 for better staff visualization and safety. Pt's family called to notify staff that pt has a hx of dementia and is known for getting out of bed in the middle of the night. Educated pt about fall prevention and provided him with hospital socks. Educated pt about use of call light and ensured that call light is in reach

## 2023-03-02 NOTE — H&P
R Internal Medicine History & Physical Note    Date of Service  3/1/2023    [facility] Team: SANNA   Attending: Hansa Linda M.d.  Senior Resident: Dr. Toro    Contact Number: [phone]    Primary Care Physician  Gely Rose M.D.    Consultants  orthopedics    Code Status  DNAR/DNI    Chief Complaint  Chief Complaint   Patient presents with    Leg Pain     Pt states he fell Monday and hit is head. He then fell again on Tuesday and hurt his R leg. Today pt endorses worsening R thigh and groin pain. CMS intact       History of Presenting Illness (HPI):   Chico Cedillo is a 89 y.o. male with PMH that includes Alzheimer disease who presented 3/1/2023 with history multiple falls at home.    Per patient he has fallen multiple times in the past week alone. Family notes he fell on Monday and sustained a facial wound at that time after tripping on a curb. Went to an urgent care at that time which placed stitches. He fell again the day of presentation while in the kitchen. Patient denies any loc during this or any prior falls. No prodromal symptoms.     While in the ED, was found to have nondisplaced right superior pubic ramus fracture and fracture of anterior column of acetabulum.    Of note, patient lives alone. He local family has been working on getting to him to an assisted living facility.     I discussed the plan of care with patient and family.    Review of Systems  Review of Systems   Constitutional:  Negative for chills, fever and malaise/fatigue.   HENT:  Negative for nosebleeds and sore throat.    Eyes:  Negative for blurred vision, double vision and photophobia.   Respiratory:  Negative for cough, shortness of breath and wheezing.    Cardiovascular:  Negative for chest pain, palpitations and leg swelling.   Gastrointestinal:  Negative for abdominal pain, blood in stool, heartburn, melena, nausea and vomiting.   Genitourinary:  Negative for dysuria, flank pain, frequency and urgency.   Musculoskeletal:   Positive for falls. Negative for myalgias and neck pain.   Skin:  Negative for rash.   Neurological:  Negative for dizziness, sensory change, speech change, focal weakness, loss of consciousness, weakness and headaches.   Endo/Heme/Allergies:  Does not bruise/bleed easily.   Psychiatric/Behavioral:  Negative for depression, substance abuse and suicidal ideas.    All other systems reviewed and are negative.    Past Medical History   has a past medical history of Cancer (HCC) (2011), Cataract (01/27/2021), Diarrhea, Pacemaker, and Urinary incontinence (01/27/2021).    Surgical History   has a past surgical history that includes pacemaker insertion; other; tonsillectomy; and cataract phaco with iol (Right, 2/10/2021).     Family History  family history includes Heart Disease in his father.   Family history reviewed with patient.     Social History  Tobacco: Denies  Alcohol: Denies  Recreational drugs (illegal or prescription): Denies  Employment: Retired  Living Situation: Lives alone  Recent Travel: Denies  Primary Care Provider: Reviewed Milton IRENE  Other (stressors, spirituality, exposures): NA    Allergies  No Known Allergies    Medications  Prior to Admission Medications   Prescriptions Last Dose Informant Patient Reported? Taking?   acetaminophen (TYLENOL) 500 MG Tab 2/28/2023 at PM Significant Other Yes Yes   Sig: Take 1,000 mg by mouth 2 times a day as needed for Mild Pain.   cephALEXin (KEFLEX) 500 MG Cap 3/1/2023 at AM Significant Other No No   Sig: Take 1 Capsule by mouth 3 times a day for 3 days.   mupirocin (BACTROBAN) 2 % Ointment 2/28/2023 at PM Significant Other No No   Sig: Apply 1 Application topically 2 times a day.      Facility-Administered Medications: None       Physical Exam  Temp:  [36.4 °C (97.5 °F)-36.6 °C (97.8 °F)] 36.6 °C (97.8 °F)  Pulse:  [60-78] 64  Resp:  [16-18] 18  BP: (131-154)/(63-76) 131/72  SpO2:  [93 %-98 %] 93 %  Blood Pressure : (!) 150/70   Temperature: 36.4 °C (97.5 °F)    Pulse: 61   Respiration: 16   Pulse Oximetry: 98 %       Physical Exam  Vitals and nursing note reviewed.   Constitutional:       Appearance: Normal appearance.   HENT:      Head: Normocephalic.      Comments: Facial wound with stitches in place on left cheek. Granulation tissue present, no erythema     Mouth/Throat:      Mouth: Mucous membranes are moist.   Eyes:      General: No scleral icterus.     Extraocular Movements: Extraocular movements intact.      Pupils: Pupils are equal, round, and reactive to light.   Cardiovascular:      Rate and Rhythm: Normal rate.      Pulses: Normal pulses.   Pulmonary:      Effort: Pulmonary effort is normal. No respiratory distress.      Breath sounds: Normal breath sounds.   Abdominal:      General: Abdomen is flat.      Palpations: Abdomen is soft.      Tenderness: There is no abdominal tenderness. There is no guarding.   Musculoskeletal:         General: Tenderness (Tender over right hip) present.      Cervical back: Normal range of motion and neck supple. No rigidity.      Right lower leg: No edema.      Left lower leg: No edema.   Skin:     General: Skin is warm and dry.   Neurological:      General: No focal deficit present.      Mental Status: He is alert and oriented to person, place, and time.      Cranial Nerves: No cranial nerve deficit.      Motor: No weakness.   Psychiatric:         Mood and Affect: Mood normal.         Behavior: Behavior normal.       Laboratory:  Recent Labs     03/01/23  1848   WBC 5.1   RBC 3.81*   HEMOGLOBIN 12.1*   HEMATOCRIT 35.8*   MCV 94.0   MCH 31.8   MCHC 33.8   RDW 47.8   PLATELETCT 134*   MPV 10.1     Recent Labs     03/01/23  1848   SODIUM 142   POTASSIUM 4.2   CHLORIDE 108   CO2 24   GLUCOSE 87   BUN 18   CREATININE 0.93   CALCIUM 9.2     Recent Labs     03/01/23  1848   ALTSGPT 12   ASTSGOT 22   ALKPHOSPHAT 62   TBILIRUBIN 0.8   GLUCOSE 87     Recent Labs     03/01/23  1848   APTT 33.2   INR 1.11     No results for input(s):  NTPROBNP in the last 72 hours.      No results for input(s): TROPONINT in the last 72 hours.    Imaging:  CT-PELVIS W/O PLUS RECONS   Final Result      1.  Acute on chronic right inferior pubic ramus fracture.      2.  Apparent acute nondisplaced right superior pubic ramus fracture/fracture the anterior column of the acetabulum.      3.  Bladder stones.      4.  Bilateral inguinal hernias, right greater than left.      5.  Diverticulosis without diverticulitis      DX-PELVIS-1 OR 2 VIEWS   Final Result      1.  No evidence of pelvic fracture or dislocation.      2.  Ovoid calcification superior to the pubic symphysis which may represent a calcified lymph node or a bladder stone or prostatic calcification.      DX-FEMUR-2+ RIGHT   Final Result      Negative femur series.          X-Ray:  I have personally reviewed the images and compared with prior images.    Assessment/Plan:  Problem Representation: 89 year old man with history of alzheimer who lives alone presenting with multiple mechanical falls at home.   I anticipate this patient will require at least two midnights for appropriate medical management, necessitating inpatient admission because of need for therapy with anticipated need for placement after said therapy.    Patient will need a Med/Surg bed on MEDICAL service .  The need is secondary to monitoring, fall precautions.    * History of falling- (present on admission)  Assessment & Plan  -family noting multiple falls at home  -falls have been reported as mechanical; tripping on curbs, denies any symptoms prior to falls, no loc  -Imaging notes nondisplaced right superior pubic ramus fracture and fracture of anterior column of acetabulum  -Ortho consulted in the ED  PLAN  -PT / OT consults  -Appreciate Ortho recs  -Tylenol PRN for pain control    Pubic Rami Fracture- (present on admission)  Assessment & Plan  Noted on CT  -Right pubic rami fracture  -non-operative per Ortho  -care as above    Open facial  wound, subsequent encounter- (present on admission)  Assessment & Plan  -facial wound sustained on 2/27/23 after fall  -received facial stitches on 2/27/23  PLAN  -continue to monitor  -consideration for stiches removal on 3/6/23, 7 days after placement    Late onset Alzheimer dementia (HCC)- (present on admission)  Assessment & Plan  -history of    Living alone  Assessment & Plan  -lives alone  -patient family previously working on group home placement    History of bladder stone  Assessment & Plan  -noted on CT imaging    History of bilateral inguinal hernias- (present on admission)  Assessment & Plan  -noted on CT imaging  -low clinical concern for incarceration at this time        VTE prophylaxis: SCDs/TEDs and enoxaparin ppx

## 2023-03-02 NOTE — PROGRESS NOTES
Patient has been having constant pain to R hip/leg. PRN tylenol offered but patient politely refused. RN educated patient to call if tylenol is needed.

## 2023-03-02 NOTE — CONSULTS
3/1/2023    The patient was seen at the request of Dr. Linda    HPI: Chico Cedillo is a 89 y.o. male who presents with complaints of pain to pelvis.  This started today after fall.  The pain is 6/10 and is described as sharp.  The pain is made worse by palpation of the area and made better by rest and immobilization.    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 Samuel    Urinary incontinence 01/27/2021       Past Surgical History:   Procedure Laterality Date    CATARACT PHACO WITH IOL Right 2/10/2021    Procedure: PHACOEMULSIFICATION, CATARACT, WITH IOL INSERTION;  Surgeon: Ralph Saldivar M.D.;  Location: SURGERY SAME DAY [facility];  Service: Ophthalmology    OTHER      PACEMAKER INSERTION      TONSILLECTOMY      when he was 4 years old       Medications  No current facility-administered medications on file prior to encounter.     Current Outpatient Medications on File Prior to Encounter   Medication Sig Dispense Refill    acetaminophen (TYLENOL) 500 MG Tab Take 1,000 mg by mouth 2 times a day as needed for Mild Pain.      mupirocin (BACTROBAN) 2 % Ointment Apply 1 Application topically 2 times a day. 22 g 0    cephALEXin (KEFLEX) 500 MG Cap Take 1 Capsule by mouth 3 times a day for 3 days. 9 Capsule 0       Allergies  Patient has no known allergies.    ROS  Pelvic pain. All other systems were reviewed and found to be negative    Family History   Problem Relation Age of Onset    Heart Disease Father        Social History     Socioeconomic History    Marital status:     Highest education level: Bachelor's degree (e.g., BA, AB, BS)   Tobacco Use    Smoking status: Never    Smokeless tobacco: Never   Vaping Use    Vaping Use: Never used   Substance and Sexual Activity    Alcohol use: No     Alcohol/week: 0.0 oz    Drug use: No    Sexual activity: Not Currently  "      Physical Exam  Vitals  BP (!) 150/70   Pulse 61   Temp 36.4 °C (97.5 °F) (Temporal)   Resp 16   Ht 1.575 m (5' 2\")   Wt 60.3 kg (133 lb)   SpO2 98%   General: Well Developed, Well Nourished, Age appropriate appearance  HEENT: Normocephalic, atraumatic  Psych: Normal mood and affect  Neck: Supple, nontender, no masses  Lungs: Breathing unlabored, No audible wheezing  Heart: Regular heart rate and rhythm  Abdomen: Soft, NT, ND  Neuro: Sensation grossly intact to BUE and BLE, moving all four extremities  Skin: Intact, no open wounds  Vascular: 2+ dorsalis pedis and posterior tibial, Capillary refill <2 seconds  MSK: Pain with pelvic compression, leg lengths equal      Radiographs:  CT-PELVIS W/O PLUS RECONS   Final Result      1.  Acute on chronic right inferior pubic ramus fracture.      2.  Apparent acute nondisplaced right superior pubic ramus fracture/fracture the anterior column of the acetabulum.      3.  Bladder stones.      4.  Bilateral inguinal hernias, right greater than left.      5.  Diverticulosis without diverticulitis      DX-PELVIS-1 OR 2 VIEWS   Final Result      1.  No evidence of pelvic fracture or dislocation.      2.  Ovoid calcification superior to the pubic symphysis which may represent a calcified lymph node or a bladder stone or prostatic calcification.      DX-FEMUR-2+ RIGHT   Final Result      Negative femur series.          Laboratory Values  Recent Labs     03/01/23  1848   WBC 5.1   RBC 3.81*   HEMOGLOBIN 12.1*   HEMATOCRIT 35.8*   MCV 94.0   MCH 31.8   MCHC 33.8   RDW 47.8   PLATELETCT 134*   MPV 10.1     Recent Labs     03/01/23  1848   SODIUM 142   POTASSIUM 4.2   CHLORIDE 108   CO2 24   GLUCOSE 87   BUN 18     Recent Labs     03/01/23  1848   APTT 33.2   INR 1.11         Impression:Right pubic rami fractures    Plan: No surgery required, weightbearing as tolerated, physical therapy as needed.    This consult was requested by the emergency room physician, internal medicine " physician and /or general surgery trauma physician to be done while patient is in hospital. By definition this request is urgent and could not be delayed or done on an outpatient basis.

## 2023-03-02 NOTE — ED NOTES
Pt standing up at edge of bed trying to use urinal. Did not use call light for assist. Pt requring x1-2 person assist with getting repositioned back into bed.

## 2023-03-02 NOTE — ED NOTES
Assisted pt with using urinal, pt very unsteady on his feet and instructed to call nursing staff for assistance when getting out of bed.   Pt is impulsive and has a hx of dementia and getting out of bed in the middle of the night. Notified Charge RN that pt will need to be moved to a different room if possible to accommodate a bed alarm.  Readjusted pt in bed for comfort and provided him with the call light. Pt has no further needs currently

## 2023-03-02 NOTE — ASSESSMENT & PLAN NOTE
-facial wound sustained on 2/27/23 after fall  -received facial stitches on 2/27/23  PLAN  -continue to monitor  -consideration for stiches removal on 3/6/23, 7 days after placement

## 2023-03-02 NOTE — PROGRESS NOTES
Received report and assumed care of patient. Patient alert and oriented x 3 - disoriented to time, on RA. Denies pain or discomfort at this time. Assessment completed. Patient updated regarding plan of care, all questions answered. Bed in lock and lowest position. Call light and belongings are within reach. Educated on room and call light, patient verbalized understanding. Patient expressed no further needs at this time.

## 2023-03-02 NOTE — ED NOTES
Med Rec complete per patient with spouse @ bedside  Allergies reviewed  Preferred Pharmacy: [facility] on [address] in [place]

## 2023-03-02 NOTE — PROGRESS NOTES
Pt arrived to unit via gurney. Transferred to bed by slide. A&Ox3, disoriented to time. RA. Minimal complaints of pain, only with movement. Skin assessment completed. Family at bedside. Bed in lowest position, alarm on. Pt has belongings and home walker (new to pt the last 2 weeks) at bedside.

## 2023-03-03 PROBLEM — E53.8 VITAMIN B12 DEFICIENCY: Status: ACTIVE | Noted: 2023-03-03

## 2023-03-03 PROBLEM — R41.0 DELIRIUM: Status: ACTIVE | Noted: 2023-03-02

## 2023-03-03 PROBLEM — N21.0 BLADDER STONE: Status: ACTIVE | Noted: 2023-03-01

## 2023-03-03 PROBLEM — R29.6 FREQUENT FALLS: Status: ACTIVE | Noted: 2023-03-01

## 2023-03-03 PROBLEM — S01.81XA FACIAL LACERATION: Status: ACTIVE | Noted: 2023-03-01

## 2023-03-03 PROBLEM — R03.0 ELEVATED BP WITHOUT DIAGNOSIS OF HYPERTENSION: Status: ACTIVE | Noted: 2023-03-03

## 2023-03-03 PROBLEM — E83.42 HYPOMAGNESEMIA: Status: ACTIVE | Noted: 2023-03-03

## 2023-03-03 PROBLEM — E87.6 HYPOKALEMIA: Status: ACTIVE | Noted: 2023-03-03

## 2023-03-03 LAB
25(OH)D3 SERPL-MCNC: 51 NG/ML (ref 30–100)
ANION GAP SERPL CALC-SCNC: 14 MMOL/L (ref 7–16)
BUN SERPL-MCNC: 18 MG/DL (ref 8–22)
CALCIUM SERPL-MCNC: 9.5 MG/DL (ref 8.5–10.5)
CHLORIDE SERPL-SCNC: 105 MMOL/L (ref 96–112)
CO2 SERPL-SCNC: 24 MMOL/L (ref 20–33)
CREAT SERPL-MCNC: 0.87 MG/DL (ref 0.5–1.4)
ERYTHROCYTE [DISTWIDTH] IN BLOOD BY AUTOMATED COUNT: 45.6 FL (ref 35.9–50)
GFR SERPLBLD CREATININE-BSD FMLA CKD-EPI: 82 ML/MIN/1.73 M 2
GLUCOSE SERPL-MCNC: 85 MG/DL (ref 65–99)
HCT VFR BLD AUTO: 40.9 % (ref 42–52)
HGB BLD-MCNC: 14 G/DL (ref 14–18)
MAGNESIUM SERPL-MCNC: 1.9 MG/DL (ref 1.5–2.5)
MCH RBC QN AUTO: 31.7 PG (ref 27–33)
MCHC RBC AUTO-ENTMCNC: 34.2 G/DL (ref 33.7–35.3)
MCV RBC AUTO: 92.7 FL (ref 81.4–97.8)
PHOSPHATE SERPL-MCNC: 3.3 MG/DL (ref 2.5–4.5)
PLATELET # BLD AUTO: 164 K/UL (ref 164–446)
PMV BLD AUTO: 10.2 FL (ref 9–12.9)
POTASSIUM SERPL-SCNC: 3.5 MMOL/L (ref 3.6–5.5)
RBC # BLD AUTO: 4.41 M/UL (ref 4.7–6.1)
SODIUM SERPL-SCNC: 143 MMOL/L (ref 135–145)
WBC # BLD AUTO: 5.9 K/UL (ref 4.8–10.8)

## 2023-03-03 PROCEDURE — 700111 HCHG RX REV CODE 636 W/ 250 OVERRIDE (IP): Performed by: STUDENT IN AN ORGANIZED HEALTH CARE EDUCATION/TRAINING PROGRAM

## 2023-03-03 PROCEDURE — A9270 NON-COVERED ITEM OR SERVICE: HCPCS | Performed by: FAMILY MEDICINE

## 2023-03-03 PROCEDURE — 97530 THERAPEUTIC ACTIVITIES: CPT

## 2023-03-03 PROCEDURE — 85027 COMPLETE CBC AUTOMATED: CPT

## 2023-03-03 PROCEDURE — 700102 HCHG RX REV CODE 250 W/ 637 OVERRIDE(OP): Performed by: FAMILY MEDICINE

## 2023-03-03 PROCEDURE — 84100 ASSAY OF PHOSPHORUS: CPT

## 2023-03-03 PROCEDURE — 82306 VITAMIN D 25 HYDROXY: CPT

## 2023-03-03 PROCEDURE — 80048 BASIC METABOLIC PNL TOTAL CA: CPT

## 2023-03-03 PROCEDURE — 97166 OT EVAL MOD COMPLEX 45 MIN: CPT

## 2023-03-03 PROCEDURE — 99232 SBSQ HOSP IP/OBS MODERATE 35: CPT | Performed by: FAMILY MEDICINE

## 2023-03-03 PROCEDURE — 97162 PT EVAL MOD COMPLEX 30 MIN: CPT

## 2023-03-03 PROCEDURE — 83735 ASSAY OF MAGNESIUM: CPT

## 2023-03-03 PROCEDURE — 36415 COLL VENOUS BLD VENIPUNCTURE: CPT

## 2023-03-03 PROCEDURE — 770001 HCHG ROOM/CARE - MED/SURG/GYN PRIV*

## 2023-03-03 RX ORDER — CHOLECALCIFEROL (VITAMIN D3) 125 MCG
5 CAPSULE ORAL NIGHTLY
Status: DISCONTINUED | OUTPATIENT
Start: 2023-03-03 | End: 2023-03-08 | Stop reason: HOSPADM

## 2023-03-03 RX ORDER — MAGNESIUM SULFATE HEPTAHYDRATE 40 MG/ML
2 INJECTION, SOLUTION INTRAVENOUS ONCE
Status: DISCONTINUED | OUTPATIENT
Start: 2023-03-03 | End: 2023-03-03

## 2023-03-03 RX ORDER — LANOLIN ALCOHOL/MO/W.PET/CERES
400 CREAM (GRAM) TOPICAL 2 TIMES DAILY
Status: DISCONTINUED | OUTPATIENT
Start: 2023-03-03 | End: 2023-03-04

## 2023-03-03 RX ORDER — LABETALOL HYDROCHLORIDE 5 MG/ML
10 INJECTION, SOLUTION INTRAVENOUS EVERY 6 HOURS PRN
Status: DISCONTINUED | OUTPATIENT
Start: 2023-03-03 | End: 2023-03-08 | Stop reason: HOSPADM

## 2023-03-03 RX ORDER — POTASSIUM CHLORIDE 20 MEQ/1
20 TABLET, EXTENDED RELEASE ORAL DAILY
Status: DISCONTINUED | OUTPATIENT
Start: 2023-03-03 | End: 2023-03-04

## 2023-03-03 RX ORDER — ACETAMINOPHEN 500 MG
1000 TABLET ORAL 2 TIMES DAILY
Status: DISCONTINUED | OUTPATIENT
Start: 2023-03-03 | End: 2023-03-08 | Stop reason: HOSPADM

## 2023-03-03 RX ADMIN — ACETAMINOPHEN 1000 MG: 500 TABLET, FILM COATED ORAL at 18:07

## 2023-03-03 RX ADMIN — POTASSIUM CHLORIDE 20 MEQ: 1500 TABLET, EXTENDED RELEASE ORAL at 08:52

## 2023-03-03 RX ADMIN — Medication 400 MG: at 08:52

## 2023-03-03 RX ADMIN — ENOXAPARIN SODIUM 40 MG: 40 INJECTION SUBCUTANEOUS at 18:07

## 2023-03-03 RX ADMIN — Medication 400 MG: at 18:08

## 2023-03-03 RX ADMIN — Medication 5 MG: at 20:13

## 2023-03-03 RX ADMIN — ACETAMINOPHEN 1000 MG: 500 TABLET, FILM COATED ORAL at 10:27

## 2023-03-03 ASSESSMENT — ENCOUNTER SYMPTOMS
HEADACHES: 0
WHEEZING: 0
MYALGIAS: 0
NERVOUS/ANXIOUS: 0
PALPITATIONS: 0
ABDOMINAL PAIN: 0
FALLS: 1
SHORTNESS OF BREATH: 0
SORE THROAT: 0
SPEECH CHANGE: 0
SENSORY CHANGE: 0
DIAPHORESIS: 0
BLURRED VISION: 0
NAUSEA: 0
DIZZINESS: 0
BACK PAIN: 0
FEVER: 0
WEAKNESS: 0
VOMITING: 0
FOCAL WEAKNESS: 0
HEARTBURN: 0
FLANK PAIN: 0
NECK PAIN: 0
CHILLS: 0
DIARRHEA: 0
COUGH: 0

## 2023-03-03 ASSESSMENT — ACTIVITIES OF DAILY LIVING (ADL): TOILETING: INDEPENDENT

## 2023-03-03 ASSESSMENT — COGNITIVE AND FUNCTIONAL STATUS - GENERAL
DAILY ACTIVITIY SCORE: 20
TURNING FROM BACK TO SIDE WHILE IN FLAT BAD: A LITTLE
HELP NEEDED FOR BATHING: A LITTLE
MOVING FROM LYING ON BACK TO SITTING ON SIDE OF FLAT BED: UNABLE
SUGGESTED CMS G CODE MODIFIER DAILY ACTIVITY: CJ
MOVING TO AND FROM BED TO CHAIR: UNABLE
DRESSING REGULAR LOWER BODY CLOTHING: A LITTLE
SUGGESTED CMS G CODE MODIFIER MOBILITY: CL
CLIMB 3 TO 5 STEPS WITH RAILING: A LOT
TOILETING: A LITTLE
MOBILITY SCORE: 13
STANDING UP FROM CHAIR USING ARMS: A LITTLE
DRESSING REGULAR UPPER BODY CLOTHING: A LITTLE
WALKING IN HOSPITAL ROOM: A LITTLE

## 2023-03-03 ASSESSMENT — GAIT ASSESSMENTS
ASSISTIVE DEVICE: FRONT WHEEL WALKER
GAIT LEVEL OF ASSIST: CONTACT GUARD ASSIST
DEVIATION: OTHER (COMMENT);BRADYKINETIC
DISTANCE (FEET): 15

## 2023-03-03 ASSESSMENT — PAIN DESCRIPTION - PAIN TYPE: TYPE: ACUTE PAIN

## 2023-03-03 NOTE — THERAPY
"Physical Therapy   Initial Evaluation     Patient Name: Chico Cedillo  Age:  89 y.o., Sex:  male  Medical Record #: [id number]  Today's Date: 3/3/2023     Precautions  Precautions: (P) Fall Risk    Assessment    Patient is 89 y.o. male with GLFx2, striking head, superior pubic ramus fx, anterior acetabulum fx. PMHx includes alzheimers disease.     Pt is agreeable to participation in therapy session and tolerates session well, pt's daughter at bedside t/o session and assists with subjective/history and POC. Pt has been accepted at [facility]. Pt requires min assist for supine to/from sit, CGA for sit to stand and min assist for initial standing balance due to posterior lean, which improves as pt ambulates with CGA to/from bathroom with FWW. Pt will benefit from continued PT services in acute setting to address instability of gait and transfer training, as well as in post acute setting. No DME needs; pt has SPC and 4ww.       Plan    Physical Therapy Initial Treatment Plan   Treatment Plan : (P) Bed Mobility, Neuro Re-Education / Balance, Gait Training, Therapeutic Activities, Therapeutic Exercise  Treatment Frequency: (P) 3 Times per Week  Duration: (P) Until Therapy Goals Met    DC Equipment Recommendations: (P) Front-Wheel Walker  Discharge Recommendations: (P) Recommend post-acute placement for additional physical therapy services prior to discharge home       Subjective    \"At the facility they have a shower curtain. At home I have a sliding door.\"      Objective       03/03/23 1359   Charge Group   PT Evaluation PT Evaluation Mod   Total Time Spent   PT Total Time Yes   PT Evaluation Time Spent (Mins) 15   PT Therapeutic Activities Time Spent (Mins) 14   PT Total Time Spent (Calculated) 29    Services   Is patient using  services for this encounter? No   Initial Contact Note    Initial Contact Note Order Received and Verified, Evaluation Only - Patient Does Not Require Further Acute Physical " "Therapy at this Time.  However, May Benefit from Post Acute Therapy for Higher Level Functional Deficits.   Precautions   Precautions Fall Risk   Vitals   O2 Delivery Device None - Room Air   Pain 0 - 10 Group   Therapist Pain Assessment 0;Post Activity Pain Same as Prior to Activity   Prior Living Situation   Prior Services Home-Independent   Housing / Facility 1 Story House   Steps Into Home 3   Steps In Home 0   Bathroom Set up Walk In Shower;Grab Bars   Equipment Owned Single Point Cane;Grab Bar(s) In Tub / Shower, 4ww   Lives with - Patient's Self Care Capacity Alone and Unable to Care For Self   Comments daughter at bedside, reports she has been staying with pt for past 3 weeks, plan to transistion to [place]   Prior Level of Functional Mobility   Bed Mobility Independent   Transfer Status Independent   Ambulation Independent   Assistive Devices Used Single Point Cane   Stairs Independent   History of Falls   History of Falls Yes   Cognition    Cognition / Consciousness X   Orientation Level Not Oriented to Reason   Level of Consciousness Alert   Ability To Follow Commands 1 Step   Safety Awareness Impaired   New Learning Impaired   Attention Impaired   Comments identifies daughter as \"my mother.\"   Strength Upper Body   Upper Body Strength  WDL   Strength Lower Body   Lower Body Strength  WDL   Comments for standing and ambulation   Coordination Lower Body    Coordination Lower Body  WDL   Balance Assessment   Sitting Balance (Static) Fair +   Sitting Balance (Dynamic) Fair   Standing Balance (Static) Poor -   Standing Balance (Dynamic) Poor -   Weight Shift Sitting Fair   Weight Shift Standing Fair   Bed Mobility    Supine to Sit Minimal Assist   Sit to Supine Minimal Assist   Scooting Minimal Assist   Gait Analysis   Gait Level Of Assist Contact Guard Assist   Assistive Device Front Wheel Walker   Distance (Feet) 15   # of Times Distance was Traveled 2   Deviation Other (Comment);Bradykinetic  (posterior lean " initially, improves)   Functional Mobility   Sit to Stand Contact Guard Assist   Toilet Transfers Minimal Assist   Mobility supine to sit, ambulation to bathroom, return to supine   How much difficulty does the patient currently have...   Turning over in bed (including adjusting bedclothes, sheets and blankets)? 3   Sitting down on and standing up from a chair with arms (e.g., wheelchair, bedside commode, etc.) 1   Moving from lying on back to sitting on the side of the bed? 1   How much help from another person does the patient currently need...   Moving to and from a bed to a chair (including a wheelchair)? 3   Need to walk in a hospital room? 3   Climbing 3-5 steps with a railing? 2   6 clicks Mobility Score 13   Activity Tolerance   Sitting in Chair NT   Sitting Edge of Bed 5 min   Standing 4 min total   Short Term Goals    Short Term Goal # 1 supine to/from sit EOB with use of railings mod I in 6 visits   Short Term Goal # 2 sit to stand from chair, bed with supervision in 6 visits   Short Term Goal # 3 ambulation with FWW and  feet in 6 visits   Education Group   Education Provided Role of Physical Therapist;Gait Training;Transfer Status   Role of Physical Therapist Patient Response Patient;Family;Acceptance;Explanation;Verbal Demonstration   Gait Training Patient Response Patient;Family;Acceptance;Explanation;Verbal Demonstration;Action Demonstration   Transfer Status Patient Response Patient;Family;Acceptance;Explanation;Verbal Demonstration;Action Demonstration   Physical Therapy Initial Treatment Plan    Treatment Plan  Bed Mobility;Neuro Re-Education / Balance;Gait Training;Therapeutic Activities;Therapeutic Exercise   Treatment Frequency 3 Times per Week   Duration Until Therapy Goals Met   Problem List    Problems Impaired Bed Mobility;Impaired Transfers;Impaired Ambulation;Impaired Balance;Decreased Activity Tolerance;Safety Awareness Deficits / Cognition   Anticipated Discharge Equipment and  Recommendations   DC Equipment Recommendations None   Discharge Recommendations Recommend post-acute placement for additional physical therapy services prior to discharge home   Interdisciplinary Plan of Care Collaboration   IDT Collaboration with  Nursing;Occupational Therapist;Family / Caregiver   Patient Position at End of Therapy In Bed;Bed Alarm On;Call Light within Reach;Phone within Reach;Tray Table within Reach;Family / Friend in Room   Collaboration Comments RN updated   Session Information   Date / Session Number  3/3  -1  (1/3; 3/9)     Tavia Vickers DPT

## 2023-03-03 NOTE — PROGRESS NOTES
[facility] Medicine Daily Progress Note    Date of Service  3/3/2023    Chief Complaint  Chico Cedillo is a 89 y.o. male admitted 3/1/2023 with pubic ramus fractures.      Hospital Course  Admitted with Pubic ramus fractures after a ground-level fall.  Patient with known history of dementia, frequent falls.  Orthopedic surgery was consulted on the case and recommended nonoperative intervention.    Interval Problem Update  Pubic fractures -pain seems controlled  Falls - frequent and uses a walker at home  Elev BP - sbp 122-152  Delirium - appears more calm while daughter is at bedside  Low potassium and magnesium    I have discussed this patient's plan of care and discharge plan at IDT rounds today with Case Management, Nursing, Nursing leadership, and other members of the IDT team.    Consultants/Specialty  orthopedics    Code Status  DNAR/DNI    Disposition  Patient is medically cleared for discharge.   Anticipate discharge to to skilled nursing facility.  I have placed the appropriate orders for post-discharge needs.    Review of Systems  Review of Systems   Constitutional:  Negative for chills, diaphoresis, fever and malaise/fatigue.   HENT:  Negative for congestion, hearing loss and sore throat.    Eyes:  Negative for blurred vision.   Respiratory:  Negative for cough, shortness of breath and wheezing.    Cardiovascular:  Negative for chest pain, palpitations and leg swelling.   Gastrointestinal:  Negative for abdominal pain, diarrhea, heartburn, nausea and vomiting.   Genitourinary:  Negative for dysuria, flank pain and hematuria.   Musculoskeletal:  Positive for falls and joint pain. Negative for back pain, myalgias and neck pain.   Skin:  Negative for rash.   Neurological:  Negative for dizziness, sensory change, speech change, focal weakness, weakness and headaches.   Psychiatric/Behavioral:  The patient is not nervous/anxious.       Physical Exam  Temp:  [35.9 °C (96.7 °F)-37.3 °C (99.2 °F)] 36.4 °C (97.5  °F)  Pulse:  [61-84] 84  Resp:  [16-18] 18  BP: (122-152)/(57-81) 152/81  SpO2:  [91 %-97 %] 97 %    Physical Exam  Vitals and nursing note reviewed.   HENT:      Head: Normocephalic.      Comments: Laceration at left cheek     Nose: No congestion.      Mouth/Throat:      Mouth: Mucous membranes are moist.   Eyes:      Extraocular Movements: Extraocular movements intact.      Conjunctiva/sclera: Conjunctivae normal.   Cardiovascular:      Rate and Rhythm: Normal rate and regular rhythm.   Pulmonary:      Effort: Pulmonary effort is normal.      Breath sounds: Normal breath sounds.   Abdominal:      General: There is no distension.      Tenderness: There is no abdominal tenderness. There is no guarding or rebound.   Musculoskeletal:      Cervical back: No tenderness.      Right lower leg: No edema.      Left lower leg: No edema.   Skin:     General: Skin is warm and dry.   Neurological:      General: No focal deficit present.      Mental Status: He is alert.      Cranial Nerves: No cranial nerve deficit.      Comments: Oriented to person   Psychiatric:         Speech: Speech is delayed and tangential.         Cognition and Memory: Memory is impaired. He exhibits impaired recent memory.       Fluids    Intake/Output Summary (Last 24 hours) at 3/3/2023 1000  Last data filed at 3/3/2023 0800  Gross per 24 hour   Intake 500 ml   Output 200 ml   Net 300 ml       Laboratory  Recent Labs     03/01/23 1848 03/02/23  0120 03/03/23  0614   WBC 5.1 5.3 5.9   RBC 3.81* 3.85* 4.41*   HEMOGLOBIN 12.1* 12.4* 14.0   HEMATOCRIT 35.8* 36.9* 40.9*   MCV 94.0 95.8 92.7   MCH 31.8 32.2 31.7   MCHC 33.8 33.6* 34.2   RDW 47.8 48.1 45.6   PLATELETCT 134* 142* 164   MPV 10.1 9.9 10.2     Recent Labs     03/01/23 1848 03/02/23  0120 03/03/23  0614   SODIUM 142 142 143   POTASSIUM 4.2 3.9 3.5*   CHLORIDE 108 108 105   CO2 24 23 24   GLUCOSE 87 76 85   BUN 18 18 18   CREATININE 0.93 0.94 0.87   CALCIUM 9.2 9.2 9.5     Recent Labs      03/01/23  1848   APTT 33.2   INR 1.11               Imaging  CT-PELVIS W/O PLUS RECONS   Final Result      1.  Acute on chronic right inferior pubic ramus fracture.      2.  Apparent acute nondisplaced right superior pubic ramus fracture/fracture the anterior column of the acetabulum.      3.  Bladder stones.      4.  Bilateral inguinal hernias, right greater than left.      5.  Diverticulosis without diverticulitis      DX-PELVIS-1 OR 2 VIEWS   Final Result      1.  No evidence of pelvic fracture or dislocation.      2.  Ovoid calcification superior to the pubic symphysis which may represent a calcified lymph node or a bladder stone or prostatic calcification.      DX-FEMUR-2+ RIGHT   Final Result      Negative femur series.           Assessment/Plan  * Pubic Rami Fracture- (present on admission)  Assessment & Plan  PT and OT   WBAT    Delirium- (present on admission)  Assessment & Plan  Avoid Benzodiazepines and Anticholinergics  Frequent orientation  Open window blinds during the day  Avoid naps during the day  Family at bedside whenever possible  Ensure adequate sleep at night - use Melatonin preferably  Avoid early morning labs  Avoid vital signs during sleep  Ambulate if possible  Provide adequate pain control  Monitor for urinary retention  Prevent and manage constipation  Discontinue IVs, Catheters, Tubes, Lines, Cardiac monitors, SCDs if possible      Frequent falls- (present on admission)  Assessment & Plan  PT and OT    Hypomagnesemia- (present on admission)  Assessment & Plan  Start oral Mg  Follow level    Hypokalemia- (present on admission)  Assessment & Plan  Start Kdur  Follow bmp    Elevated BP without diagnosis of hypertension- (present on admission)  Assessment & Plan  IV Labetalol as needed with parameters    Vitamin B12 deficiency- (present on admission)  Assessment & Plan  Check level    Bilateral inguinal hernia- (present on admission)  Assessment & Plan  monitor    Bladder stone- (present on  admission)  Assessment & Plan  Monitor for urinary retention    Facial laceration- (present on admission)  Assessment & Plan  repaired 2/27/23  Wound care    Late onset Alzheimer dementia (HCC)- (present on admission)  Assessment & Plan  Avoid Benzodiazepines and Anticholinergics  Frequent orientation  Open window blinds during the day  Avoid naps during the day  Family at bedside whenever possible  Ensure adequate sleep at night - use Melatonin preferably  Avoid early morning labs  Avoid vital signs during sleep  Ambulate if possible  Provide adequate pain control  Monitor for urinary retention  Prevent and manage constipation  Discontinue IVs, Catheters, Tubes, Lines, Cardiac monitors, SCDs if possible    Cardiac pacemaker in situ- (present on admission)  Assessment & Plan  monitor         VTE prophylaxis: enoxaparin ppx    I have performed a physical exam and reviewed and updated ROS and Plan today (3/3/2023). In review of yesterday's note (3/2/2023), there are no changes except as documented above.

## 2023-03-03 NOTE — DISCHARGE PLANNING
Care Transition Team Discharge Planning                   Discharge Plan:  Per MD in IDT pnd PT OT for patient. MD states that patient may need SNF before going to [facility]. CM will continue to follow with all discharge needs.

## 2023-03-03 NOTE — CARE PLAN
"The patient is Stable - Low risk of patient condition declining or worsening    Shift Goals  Clinical Goals: no pain  Patient Goals: rest and comfort  Family Goals: pt/ot, safety    Progress made toward(s) clinical / shift goals:  Patient does not complain of pain and has not  fallen. However patient is not oriented to location and situation. Patient cannot be redirected to the reality based topics of his current location at [facility] and how and why he got here. Patient believes that he is still in his home and his \"roll out bed and kitchen\" is outside of the hospital door. He also states that this writer and the all the staff have \"kidnapped me and I will call my  and danya everybody\". Patient continuously try's to exit the bed and had to be placed with a tele sitter. Patient does not like to be turned or touched when trying to change his linen after episodes of incontinence. Patient becomes angry and verbally abusive and balls his fist and attempts to swing at staff. He has also pulled out his IV a second time.     "

## 2023-03-03 NOTE — THERAPY
Occupational Therapy   Initial Evaluation     Patient Name: Chico Cedillo  Age:  89 y.o., Sex:  male  Medical Record #: [id number]  Today's Date: 3/3/2023     Precautions  Precautions: (P) Fall Risk    Assessment    Patient is 89 y.o. male admitted for pubic ramus fractures after GLF, non-op (WBAT BLE), delirium, hypomagnesemia, hypokalemia, HTN. Pt pleasantly confused, daughter at bedside and able to provide history/PLOF and has been staying with him for a few weeks due to multiple falls. Pt required CGA for functional mobility, lower body ADLs, and toileting due to slight unsteadiness with FWW. Will continue to see for skilled therapy while admitted as well as recommend discharge to [facility] with home health at discharge. Pts family looking to discharge monday to [facility], unable to be admitted to facility until then. Pt would benefit from continued mobility with nursing staff for all meals and ambulating to bathroom while admitted.     Plan    Occupational Therapy Initial Treatment Plan   Treatment Interventions: (P) Self Care / Activities of Daily Living, Adaptive Equipment, Therapeutic Exercises, Neuro Re-Education / Balance, Therapeutic Activity, Cognitive Skill Development  Treatment Frequency: (P) 3 Times per Week  Duration: (P) Until Therapy Goals Met    DC Equipment Recommendations: (P) None  Discharge Recommendations: (P) Other - (Discharge to [facility])     Objective       03/03/23 1358   Prior Living Situation   Prior Services None   Housing / Facility 1 Story House   Steps Into Home 3   Bathroom Set up Bathtub / Shower Combination   Equipment Owned 4-Wheel Walker;Tub / Shower Seat   Lives with - Patient's Self Care Capacity Alone and Unable to Care For Self;Adult Children   Prior Level of ADL Function   Self Feeding Independent   Grooming / Hygiene Independent   Bathing Independent   Dressing Independent   Toileting Independent   Prior Level of IADL Function   Medication Management Independent   Laundry Independent    Kitchen Mobility Independent   Finances Independent   Home Management Independent   Shopping Independent   Prior Level Of Mobility Independent Without Device in Home   History of Falls   History of Falls Yes   Date of Last Fall   (multiple, reason for admission)   Precautions   Precautions Fall Risk   Cognition    Cognition / Consciousness X   Speech/ Communication Delayed Responses;Hard of Hearing   Orientation Level Not Oriented to Place;Not Oriented to Reason   Level of Consciousness Alert   Safety Awareness Impaired   Attention Impaired   Comments Pleasant, redirectable   Active ROM Upper Body   Active ROM Upper Body  WDL   Strength Upper Body   Upper Body Strength  WDL   Sensation Upper Body   Upper Extremity Sensation  WDL   Upper Body Muscle Tone   Upper Body Muscle Tone  WDL   Balance Assessment   Sitting Balance (Static) Fair   Sitting Balance (Dynamic) Fair -   Standing Balance (Static) Poor   Standing Balance (Dynamic) Poor   Weight Shift Sitting Fair   Weight Shift Standing Poor   Comments w/ FWW   Bed Mobility    Supine to Sit Minimal Assist   Sit to Supine Minimal Assist   Rolling Minimal Assist to Rt.   ADL Assessment   Grooming Minimal Assist;Standing   Upper Body Dressing Minimal Assist   Lower Body Dressing Minimal Assist   Toileting Minimal Assist   How much help from another person does the patient currently need...   Putting on and taking off regular lower body clothing? 3   Bathing (including washing, rinsing, and drying)? 3   Toileting, which includes using a toilet, bedpan, or urinal? 3   Putting on and taking off regular upper body clothing? 3   Taking care of personal grooming such as brushing teeth? 4   Eating meals? 4   6 Clicks Daily Activity Score 20   Functional Mobility   Sit to Stand Minimal Assist   Bed, Chair, Wheelchair Transfer Minimal Assist   Toilet Transfers Minimal Assist   Transfer Method Stand Step   Mobility bed mobility, bathroom mobility, back to bed   Comments w/ FWW    Activity Tolerance   Sitting in Chair 5 min  (toilet)   Sitting Edge of Bed 3 min   Standing 5 min   Short Term Goals   Short Term Goal # 1 Pt will complete ADL transfers with supervision   Short Term Goal # 2 Pt will complete LB dressing with supervision   Short Term Goal # 3 Pt will complete toileting with supervision   Education Group   Education Provided Role of Occupational Therapist   Role of Occupational Therapist Patient Response Patient;Family;Acceptance;Explanation   Occupational Therapy Initial Treatment Plan    Treatment Interventions Self Care / Activities of Daily Living;Adaptive Equipment;Therapeutic Exercises;Neuro Re-Education / Balance;Therapeutic Activity;Cognitive Skill Development   Treatment Frequency 3 Times per Week   Duration Until Therapy Goals Met   Problem List   Problem List Decreased Active Daily Living Skills;Decreased Homemaking Skills;Decreased Activity Tolerance;Decreased Functional Mobility;Safety Awareness Deficits / Cognition;Impaired Postural Control / Balance;Impaired Cognitive Function   Anticipated Discharge Equipment and Recommendations   DC Equipment Recommendations None   Discharge Recommendations Other -  (Discharge to [facility])   Interdisciplinary Plan of Care Collaboration   IDT Collaboration with  Nursing;Physical Therapist;Family / Caregiver   Patient Position at End of Therapy In Bed;Bed Alarm On;Call Light within Reach;Tray Table within Reach;Phone within Reach;Family / Friend in Room   Collaboration Comments RN updated

## 2023-03-04 LAB
ANION GAP SERPL CALC-SCNC: 10 MMOL/L (ref 7–16)
BUN SERPL-MCNC: 21 MG/DL (ref 8–22)
CALCIUM SERPL-MCNC: 9.1 MG/DL (ref 8.5–10.5)
CHLORIDE SERPL-SCNC: 106 MMOL/L (ref 96–112)
CO2 SERPL-SCNC: 25 MMOL/L (ref 20–33)
CREAT SERPL-MCNC: 1.03 MG/DL (ref 0.5–1.4)
ERYTHROCYTE [DISTWIDTH] IN BLOOD BY AUTOMATED COUNT: 46.5 FL (ref 35.9–50)
GFR SERPLBLD CREATININE-BSD FMLA CKD-EPI: 69 ML/MIN/1.73 M 2
GLUCOSE SERPL-MCNC: 92 MG/DL (ref 65–99)
HCT VFR BLD AUTO: 39.5 % (ref 42–52)
HGB BLD-MCNC: 13.4 G/DL (ref 14–18)
MAGNESIUM SERPL-MCNC: 2.2 MG/DL (ref 1.5–2.5)
MCH RBC QN AUTO: 31.6 PG (ref 27–33)
MCHC RBC AUTO-ENTMCNC: 33.9 G/DL (ref 33.7–35.3)
MCV RBC AUTO: 93.2 FL (ref 81.4–97.8)
PHOSPHATE SERPL-MCNC: 3.5 MG/DL (ref 2.5–4.5)
PLATELET # BLD AUTO: 148 K/UL (ref 164–446)
PMV BLD AUTO: 9.9 FL (ref 9–12.9)
POTASSIUM SERPL-SCNC: 4.1 MMOL/L (ref 3.6–5.5)
RBC # BLD AUTO: 4.24 M/UL (ref 4.7–6.1)
SODIUM SERPL-SCNC: 141 MMOL/L (ref 135–145)
VIT B12 SERPL-MCNC: 463 PG/ML (ref 211–911)
WBC # BLD AUTO: 3.7 K/UL (ref 4.8–10.8)

## 2023-03-04 PROCEDURE — 80048 BASIC METABOLIC PNL TOTAL CA: CPT

## 2023-03-04 PROCEDURE — 83735 ASSAY OF MAGNESIUM: CPT

## 2023-03-04 PROCEDURE — 99232 SBSQ HOSP IP/OBS MODERATE 35: CPT | Performed by: FAMILY MEDICINE

## 2023-03-04 PROCEDURE — 700102 HCHG RX REV CODE 250 W/ 637 OVERRIDE(OP): Performed by: STUDENT IN AN ORGANIZED HEALTH CARE EDUCATION/TRAINING PROGRAM

## 2023-03-04 PROCEDURE — 700111 HCHG RX REV CODE 636 W/ 250 OVERRIDE (IP): Performed by: STUDENT IN AN ORGANIZED HEALTH CARE EDUCATION/TRAINING PROGRAM

## 2023-03-04 PROCEDURE — 85027 COMPLETE CBC AUTOMATED: CPT

## 2023-03-04 PROCEDURE — 36415 COLL VENOUS BLD VENIPUNCTURE: CPT

## 2023-03-04 PROCEDURE — A9270 NON-COVERED ITEM OR SERVICE: HCPCS | Performed by: STUDENT IN AN ORGANIZED HEALTH CARE EDUCATION/TRAINING PROGRAM

## 2023-03-04 PROCEDURE — 700102 HCHG RX REV CODE 250 W/ 637 OVERRIDE(OP): Performed by: FAMILY MEDICINE

## 2023-03-04 PROCEDURE — 770001 HCHG ROOM/CARE - MED/SURG/GYN PRIV*

## 2023-03-04 PROCEDURE — 84100 ASSAY OF PHOSPHORUS: CPT

## 2023-03-04 PROCEDURE — A9270 NON-COVERED ITEM OR SERVICE: HCPCS | Performed by: FAMILY MEDICINE

## 2023-03-04 PROCEDURE — 82607 VITAMIN B-12: CPT

## 2023-03-04 RX ADMIN — POTASSIUM CHLORIDE 20 MEQ: 1500 TABLET, EXTENDED RELEASE ORAL at 05:30

## 2023-03-04 RX ADMIN — ACETAMINOPHEN 1000 MG: 500 TABLET, FILM COATED ORAL at 05:30

## 2023-03-04 RX ADMIN — Medication 5 MG: at 20:48

## 2023-03-04 RX ADMIN — ACETAMINOPHEN 1000 MG: 500 TABLET, FILM COATED ORAL at 17:19

## 2023-03-04 RX ADMIN — ENOXAPARIN SODIUM 40 MG: 40 INJECTION SUBCUTANEOUS at 17:18

## 2023-03-04 RX ADMIN — Medication 400 MG: at 05:31

## 2023-03-04 RX ADMIN — SENNOSIDES AND DOCUSATE SODIUM 2 TABLET: 50; 8.6 TABLET ORAL at 05:30

## 2023-03-04 ASSESSMENT — ENCOUNTER SYMPTOMS
NECK PAIN: 0
DIZZINESS: 0
CHILLS: 0
SHORTNESS OF BREATH: 0
BACK PAIN: 0
PALPITATIONS: 0
VOMITING: 0
FEVER: 0
COUGH: 0
WHEEZING: 0
ABDOMINAL PAIN: 0
SPEECH CHANGE: 0
SENSORY CHANGE: 0
DIARRHEA: 0
HEADACHES: 0
FALLS: 1
WEAKNESS: 0
FOCAL WEAKNESS: 0
NAUSEA: 0
MYALGIAS: 0
NERVOUS/ANXIOUS: 0
DIAPHORESIS: 0
FLANK PAIN: 0
SORE THROAT: 0
BLURRED VISION: 0
HEARTBURN: 0

## 2023-03-04 NOTE — CARE PLAN
The patient is Stable - Low risk of patient condition declining or worsening    Shift Goals  Clinical Goals: free from fall, sleep      Progress made toward(s) clinical / shift goals:  bed alarm and telesitter in place. Pt. On desk bed.       Problem: Pain - Standard  Goal: Alleviation of pain or a reduction in pain to the patient’s comfort goal  Outcome: Progressing     Problem: Knowledge Deficit - Standard  Goal: Patient and family/care givers will demonstrate understanding of plan of care, disease process/condition, diagnostic tests and medications  Outcome: Progressing     Problem: Fall Risk  Goal: Patient will remain free from falls  Outcome: Progressing

## 2023-03-04 NOTE — PROGRESS NOTES
Pt. Received in bed awake, orientedx2 disoriented to time and place. Pleasantly confuse, [employer] [place], no hearing aids noted at bedside. Pt. Does not have PIV per day RN ok per md. Denies any complaint of pain at the time only when being turned.    Skin assessment done on pt.   -Back of ears are intact  -[place] is very red slow to anca, pt. Glasses removed for the night  -Left side of face is bruised, yellow to purple in color. Scab on L side of eye.  -Elbows are both very red but blanching, refused mepilex at this time.  -L medial side of forearm has a bruise  -[place] is very red and slow to anca, pt. Is incontinent unable to put mepilex. Waffle overlay placed instead and pillows used to offload. Q2 turns.  -Both knees has some abrasion  -bilateral heels are dry , red but blanching.

## 2023-03-04 NOTE — PROGRESS NOTES
[facility] Medicine Daily Progress Note    Date of Service  3/4/2023    Chief Complaint  Chico Cedillo is a 89 y.o. male admitted 3/1/2023 with pubic ramus fractures.      Hospital Course  Admitted with Pubic ramus fractures after a ground-level fall.  Patient with known history of dementia, frequent falls.  Orthopedic surgery was consulted on the case and recommended nonoperative intervention.    Interval Problem Update  Pubic fractures -pain controlled, PT and OT evals noted  Elev BP - sbp   Delirium - alert currently    Updates given and plan of care discussed with patient's daughter who was at bedside.    I have discussed this patient's plan of care and discharge plan at IDT rounds today with Case Management, Nursing, Nursing leadership, and other members of the IDT team.    Consultants/Specialty  orthopedics    Code Status  DNAR/DNI    Disposition  Patient is medically cleared for discharge.   Anticipate discharge to to skilled nursing facility.  I have placed the appropriate orders for post-discharge needs.    Review of Systems  Review of Systems   Constitutional:  Negative for chills, diaphoresis, fever and malaise/fatigue.   HENT:  Negative for congestion, hearing loss and sore throat.    Eyes:  Negative for blurred vision.   Respiratory:  Negative for cough, shortness of breath and wheezing.    Cardiovascular:  Negative for chest pain, palpitations and leg swelling.   Gastrointestinal:  Negative for abdominal pain, diarrhea, heartburn, nausea and vomiting.   Genitourinary:  Negative for dysuria, flank pain and hematuria.   Musculoskeletal:  Positive for falls and joint pain. Negative for back pain, myalgias and neck pain.   Skin:  Negative for rash.   Neurological:  Negative for dizziness, sensory change, speech change, focal weakness, weakness and headaches.   Psychiatric/Behavioral:  The patient is not nervous/anxious.       Physical Exam  Temp:  [35.9 °C (96.6 °F)-37.2 °C (98.9 °F)] 36.1 °C (97  °F)  Pulse:  [55-96] 55  Resp:  [16-20] 16  BP: ()/(46-73) 114/73  SpO2:  [95 %-98 %] 95 %    Physical Exam  Vitals and nursing note reviewed.   HENT:      Head: Normocephalic.      Comments: Laceration at left cheek     Nose: No congestion.      Mouth/Throat:      Mouth: Mucous membranes are moist.   Eyes:      Extraocular Movements: Extraocular movements intact.      Conjunctiva/sclera: Conjunctivae normal.   Cardiovascular:      Rate and Rhythm: Normal rate and regular rhythm.   Pulmonary:      Effort: Pulmonary effort is normal.      Breath sounds: Normal breath sounds.   Abdominal:      General: There is no distension.      Tenderness: There is no abdominal tenderness. There is no guarding or rebound.   Musculoskeletal:      Cervical back: No tenderness.      Right lower leg: No edema.      Left lower leg: No edema.   Skin:     General: Skin is warm and dry.   Neurological:      General: No focal deficit present.      Mental Status: He is alert.      Cranial Nerves: No cranial nerve deficit.      Comments: Oriented to person   Psychiatric:         Speech: Speech is tangential.         Cognition and Memory: Memory is impaired. He exhibits impaired recent memory.       Fluids    Intake/Output Summary (Last 24 hours) at 3/4/2023 1011  Last data filed at 3/4/2023 0900  Gross per 24 hour   Intake 360 ml   Output 200 ml   Net 160 ml       Laboratory  Recent Labs     03/02/23  0120 03/03/23  0614 03/04/23  0824   WBC 5.3 5.9 3.7*   RBC 3.85* 4.41* 4.24*   HEMOGLOBIN 12.4* 14.0 13.4*   HEMATOCRIT 36.9* 40.9* 39.5*   MCV 95.8 92.7 93.2   MCH 32.2 31.7 31.6   MCHC 33.6* 34.2 33.9   RDW 48.1 45.6 46.5   PLATELETCT 142* 164 148*   MPV 9.9 10.2 9.9     Recent Labs     03/02/23  0120 03/03/23  0614 03/04/23  0824   SODIUM 142 143 141   POTASSIUM 3.9 3.5* 4.1   CHLORIDE 108 105 106   CO2 23 24 25   GLUCOSE 76 85 92   BUN 18 18 21   CREATININE 0.94 0.87 1.03   CALCIUM 9.2 9.5 9.1     Recent Labs     03/01/23  1848   APTT  33.2   INR 1.11               Imaging  CT-PELVIS W/O PLUS RECONS   Final Result      1.  Acute on chronic right inferior pubic ramus fracture.      2.  Apparent acute nondisplaced right superior pubic ramus fracture/fracture the anterior column of the acetabulum.      3.  Bladder stones.      4.  Bilateral inguinal hernias, right greater than left.      5.  Diverticulosis without diverticulitis      DX-PELVIS-1 OR 2 VIEWS   Final Result      1.  No evidence of pelvic fracture or dislocation.      2.  Ovoid calcification superior to the pubic symphysis which may represent a calcified lymph node or a bladder stone or prostatic calcification.      DX-FEMUR-2+ RIGHT   Final Result      Negative femur series.           Assessment/Plan  * Pubic Rami Fracture- (present on admission)  Assessment & Plan  Pain control  PT and OT   WBAT    Delirium- (present on admission)  Assessment & Plan  Avoid Benzodiazepines and Anticholinergics  Frequent orientation  Open window blinds during the day  Avoid naps during the day  Family at bedside whenever possible  Ensure adequate sleep at night - use Melatonin preferably  Avoid early morning labs  Avoid vital signs during sleep  Ambulate if possible  Provide adequate pain control  Monitor for urinary retention  Prevent and manage constipation  Discontinue IVs, Catheters, Tubes, Lines, Cardiac monitors, SCDs if possible      Frequent falls- (present on admission)  Assessment & Plan  PT and OT    Hypomagnesemia- (present on admission)  Assessment & Plan  stop oral Mg  Follow level    Hypokalemia- (present on admission)  Assessment & Plan  stop Kdur  Follow bmp    Elevated BP without diagnosis of hypertension- (present on admission)  Assessment & Plan  IV Labetalol as needed with parameters    Vitamin B12 deficiency- (present on admission)  Assessment & Plan  wnl    Bilateral inguinal hernia- (present on admission)  Assessment & Plan  monitor    Bladder stone- (present on  admission)  Assessment & Plan  Monitor for urinary retention    Facial laceration- (present on admission)  Assessment & Plan  repaired 2/27/23  Wound care    Late onset Alzheimer dementia (HCC)- (present on admission)  Assessment & Plan  Avoid Benzodiazepines and Anticholinergics  Frequent orientation  Open window blinds during the day  Avoid naps during the day  Family at bedside whenever possible  Ensure adequate sleep at night - use Melatonin preferably  Avoid early morning labs  Avoid vital signs during sleep  Ambulate if possible  Provide adequate pain control  Monitor for urinary retention  Prevent and manage constipation  Discontinue IVs, Catheters, Tubes, Lines, Cardiac monitors, SCDs if possible    Cardiac pacemaker in situ- (present on admission)  Assessment & Plan  monitor         VTE prophylaxis: enoxaparin ppx    I have performed a physical exam and reviewed and updated ROS and Plan today (3/4/2023). In review of yesterday's note (3/3/2023), there are no changes except as documented above.

## 2023-03-04 NOTE — CARE PLAN
The patient is Stable - Low risk of patient condition declining or worsening    Shift Goals  Clinical Goals: safety, pt/ot  Patient Goals: safety, pt/ot  Family Goals: safety, pt/ot    Progress made toward(s) clinical / shift goals:    Worked with pt/ot    Problem: Pain - Standard  Goal: Alleviation of pain or a reduction in pain to the patient’s comfort goal  Outcome: Progressing     Problem: Knowledge Deficit - Standard  Goal: Patient and family/care givers will demonstrate understanding of plan of care, disease process/condition, diagnostic tests and medications  Outcome: Progressing     Problem: Fall Risk  Goal: Patient will remain free from falls  Outcome: Progressing       Patient is not progressing towards the following goals:

## 2023-03-05 PROCEDURE — 99232 SBSQ HOSP IP/OBS MODERATE 35: CPT | Performed by: FAMILY MEDICINE

## 2023-03-05 PROCEDURE — 700102 HCHG RX REV CODE 250 W/ 637 OVERRIDE(OP): Performed by: FAMILY MEDICINE

## 2023-03-05 PROCEDURE — A9270 NON-COVERED ITEM OR SERVICE: HCPCS | Performed by: FAMILY MEDICINE

## 2023-03-05 PROCEDURE — 700111 HCHG RX REV CODE 636 W/ 250 OVERRIDE (IP): Performed by: STUDENT IN AN ORGANIZED HEALTH CARE EDUCATION/TRAINING PROGRAM

## 2023-03-05 PROCEDURE — 770006 HCHG ROOM/CARE - MED/SURG/GYN SEMI*

## 2023-03-05 RX ADMIN — ENOXAPARIN SODIUM 40 MG: 40 INJECTION SUBCUTANEOUS at 17:46

## 2023-03-05 RX ADMIN — ACETAMINOPHEN 1000 MG: 500 TABLET, FILM COATED ORAL at 05:15

## 2023-03-05 RX ADMIN — ACETAMINOPHEN 1000 MG: 500 TABLET, FILM COATED ORAL at 17:46

## 2023-03-05 RX ADMIN — Medication 5 MG: at 19:44

## 2023-03-05 ASSESSMENT — ENCOUNTER SYMPTOMS
SORE THROAT: 0
NECK PAIN: 0
CHILLS: 0
BLURRED VISION: 0
HEADACHES: 0
FEVER: 0
VOMITING: 0
WEAKNESS: 0
HEARTBURN: 0
DIAPHORESIS: 0
WHEEZING: 0
BACK PAIN: 0
ABDOMINAL PAIN: 0
SHORTNESS OF BREATH: 0
MYALGIAS: 0
FOCAL WEAKNESS: 0
SENSORY CHANGE: 0
DIZZINESS: 0
NERVOUS/ANXIOUS: 0
SPEECH CHANGE: 0
FLANK PAIN: 0
COUGH: 0
PALPITATIONS: 0
DIARRHEA: 0
NAUSEA: 0
FALLS: 1

## 2023-03-05 ASSESSMENT — PAIN DESCRIPTION - PAIN TYPE: TYPE: ACUTE PAIN

## 2023-03-05 NOTE — CARE PLAN
The patient is Stable - Low risk of patient condition declining or worsening    Shift Goals  Clinical Goals: remove telesitter, free from falls      Progress made toward(s) clinical / shift goals: pt. Less confuse, more cooperative. No attempts out of bed. Pt. On scheduled tylenol. Pt. Desk bed. Routine rounding    Problem: Pain - Standard  Goal: Alleviation of pain or a reduction in pain to the patient’s comfort goal  Outcome: Progressing     Problem: Fall Risk  Goal: Patient will remain free from falls  Outcome: Progressing     Problem: Skin Integrity  Goal: Skin integrity is maintained or improved  Outcome: Progressing

## 2023-03-05 NOTE — PROGRESS NOTES
Pt. More alert and pleasantly confuse, no attempts out of bed since yesterday and per day RN today. Pt. Already on desk bed, bed alarm and strip alarm in place. Telesitter discontinued per assessment at 2030. Pt. Compliant with instructions. Charge RN aware.

## 2023-03-05 NOTE — CARE PLAN
The patient is Stable - Low risk of patient condition declining or worsening    Shift Goals  Clinical Goals: maintain safety  Patient Goals: NOÉ  Family Goals: safety, pt/ot    Progress made toward(s) clinical / shift goals:    Problem: Pain - Standard  Goal: Alleviation of pain or a reduction in pain to the patient’s comfort goal  Outcome: Progressing     Problem: Knowledge Deficit - Standard  Goal: Patient and family/care givers will demonstrate understanding of plan of care, disease process/condition, diagnostic tests and medications  Outcome: Progressing     Problem: Fall Risk  Goal: Patient will remain free from falls  Outcome: Progressing     Problem: Skin Integrity  Goal: Skin integrity is maintained or improved  Outcome: Progressing       Patient is not progressing towards the following goals:

## 2023-03-05 NOTE — PROGRESS NOTES
[facility] Medicine Daily Progress Note    Date of Service  3/5/2023    Chief Complaint  Chico Cedillo is a 89 y.o. male admitted 3/1/2023 with pubic ramus fractures.      Hospital Course  Admitted with Pubic ramus fractures after a ground-level fall.  Patient with known history of dementia, frequent falls.  Orthopedic surgery was consulted on the case and recommended nonoperative intervention.    Interval Problem Update  Pubic fractures - pain well controlled  Elev BP - sbp 114-126  Delirium -mild periods of confusion but easily redirectable    I have discussed this patient's plan of care and discharge plan at IDT rounds today with Case Management, Nursing, Nursing leadership, and other members of the IDT team.    Consultants/Specialty  orthopedics    Code Status  DNAR/DNI    Disposition  Patient is medically cleared for discharge.   Anticipate discharge to to skilled nursing facility.  I have placed the appropriate orders for post-discharge needs.    Review of Systems  Review of Systems   Constitutional:  Negative for chills, diaphoresis, fever and malaise/fatigue.   HENT:  Negative for congestion, hearing loss and sore throat.    Eyes:  Negative for blurred vision.   Respiratory:  Negative for cough, shortness of breath and wheezing.    Cardiovascular:  Negative for chest pain, palpitations and leg swelling.   Gastrointestinal:  Negative for abdominal pain, diarrhea, heartburn, nausea and vomiting.   Genitourinary:  Negative for dysuria, flank pain and hematuria.   Musculoskeletal:  Positive for falls and joint pain. Negative for back pain, myalgias and neck pain.   Skin:  Negative for rash.   Neurological:  Negative for dizziness, sensory change, speech change, focal weakness, weakness and headaches.   Psychiatric/Behavioral:  The patient is not nervous/anxious.       Physical Exam  Temp:  [35.9 °C (96.6 °F)-36.8 °C (98.2 °F)] 35.9 °C (96.6 °F)  Pulse:  [66-83] 83  Resp:  [16-17] 17  BP: (114-126)/(64-72)  120/64  SpO2:  [91 %-99 %] 91 %    Physical Exam  Vitals and nursing note reviewed.   HENT:      Head: Normocephalic.      Comments: Laceration at left cheek     Nose: No congestion.      Mouth/Throat:      Mouth: Mucous membranes are moist.   Eyes:      Extraocular Movements: Extraocular movements intact.      Conjunctiva/sclera: Conjunctivae normal.   Cardiovascular:      Rate and Rhythm: Normal rate and regular rhythm.   Pulmonary:      Effort: Pulmonary effort is normal.      Breath sounds: Normal breath sounds.   Abdominal:      General: There is no distension.      Tenderness: There is no abdominal tenderness. There is no guarding or rebound.   Musculoskeletal:      Cervical back: No tenderness.      Right lower leg: No edema.      Left lower leg: No edema.   Skin:     General: Skin is warm and dry.   Neurological:      General: No focal deficit present.      Mental Status: He is alert.      Cranial Nerves: No cranial nerve deficit.      Comments: Oriented to person   Psychiatric:         Speech: Speech is tangential.         Cognition and Memory: Memory is impaired. He exhibits impaired recent memory.       Fluids    Intake/Output Summary (Last 24 hours) at 3/5/2023 1137  Last data filed at 3/5/2023 0935  Gross per 24 hour   Intake 480 ml   Output 200 ml   Net 280 ml         Laboratory  Recent Labs     03/03/23  0614 03/04/23  0824   WBC 5.9 3.7*   RBC 4.41* 4.24*   HEMOGLOBIN 14.0 13.4*   HEMATOCRIT 40.9* 39.5*   MCV 92.7 93.2   MCH 31.7 31.6   MCHC 34.2 33.9   RDW 45.6 46.5   PLATELETCT 164 148*   MPV 10.2 9.9       Recent Labs     03/03/23  0614 03/04/23  0824   SODIUM 143 141   POTASSIUM 3.5* 4.1   CHLORIDE 105 106   CO2 24 25   GLUCOSE 85 92   BUN 18 21   CREATININE 0.87 1.03   CALCIUM 9.5 9.1                       Imaging  CT-PELVIS W/O PLUS RECONS   Final Result      1.  Acute on chronic right inferior pubic ramus fracture.      2.  Apparent acute nondisplaced right superior pubic ramus  fracture/fracture the anterior column of the acetabulum.      3.  Bladder stones.      4.  Bilateral inguinal hernias, right greater than left.      5.  Diverticulosis without diverticulitis      DX-PELVIS-1 OR 2 VIEWS   Final Result      1.  No evidence of pelvic fracture or dislocation.      2.  Ovoid calcification superior to the pubic symphysis which may represent a calcified lymph node or a bladder stone or prostatic calcification.      DX-FEMUR-2+ RIGHT   Final Result      Negative femur series.             Assessment/Plan  * Pubic Rami Fracture- (present on admission)  Assessment & Plan  Pain control  PT and OT   WBAT    Delirium- (present on admission)  Assessment & Plan  Avoid Benzodiazepines and Anticholinergics  Frequent orientation  Open window blinds during the day  Avoid naps during the day  Family at bedside whenever possible  Ensure adequate sleep at night - use Melatonin preferably  Avoid early morning labs  Avoid vital signs during sleep  Ambulate if possible  Provide adequate pain control  Monitor for urinary retention  Prevent and manage constipation  Discontinue IVs, Catheters, Tubes, Lines, Cardiac monitors, SCDs if possible      Frequent falls- (present on admission)  Assessment & Plan  PT and OT    Hypomagnesemia- (present on admission)  Assessment & Plan  Follow level    Hypokalemia- (present on admission)  Assessment & Plan  Follow bmp    Elevated BP without diagnosis of hypertension- (present on admission)  Assessment & Plan  IV Labetalol as needed with parameters    Vitamin B12 deficiency- (present on admission)  Assessment & Plan  wnl    Bilateral inguinal hernia- (present on admission)  Assessment & Plan  monitor    Bladder stone- (present on admission)  Assessment & Plan  Monitor for urinary retention    Facial laceration- (present on admission)  Assessment & Plan  repaired 2/27/23  Wound care    Late onset Alzheimer dementia (HCC)- (present on admission)  Assessment & Plan  Avoid  Benzodiazepines and Anticholinergics  Frequent orientation  Open window blinds during the day  Avoid naps during the day  Family at bedside whenever possible  Ensure adequate sleep at night - use Melatonin preferably  Avoid early morning labs  Avoid vital signs during sleep  Ambulate if possible  Provide adequate pain control  Monitor for urinary retention  Prevent and manage constipation  Discontinue IVs, Catheters, Tubes, Lines, Cardiac monitors, SCDs if possible    Cardiac pacemaker in situ- (present on admission)  Assessment & Plan  monitor         VTE prophylaxis: enoxaparin ppx    I have performed a physical exam and reviewed and updated ROS and Plan today (3/5/2023). In review of yesterday's note (3/4/2023), there are no changes except as documented above.

## 2023-03-06 PROCEDURE — 700102 HCHG RX REV CODE 250 W/ 637 OVERRIDE(OP): Performed by: FAMILY MEDICINE

## 2023-03-06 PROCEDURE — A9270 NON-COVERED ITEM OR SERVICE: HCPCS | Performed by: FAMILY MEDICINE

## 2023-03-06 PROCEDURE — 99232 SBSQ HOSP IP/OBS MODERATE 35: CPT | Performed by: FAMILY MEDICINE

## 2023-03-06 PROCEDURE — 770006 HCHG ROOM/CARE - MED/SURG/GYN SEMI*

## 2023-03-06 PROCEDURE — 306591 TRAY SUTURE REMOVAL DISP: Performed by: FAMILY MEDICINE

## 2023-03-06 PROCEDURE — 700111 HCHG RX REV CODE 636 W/ 250 OVERRIDE (IP): Performed by: STUDENT IN AN ORGANIZED HEALTH CARE EDUCATION/TRAINING PROGRAM

## 2023-03-06 RX ADMIN — Medication 5 MG: at 19:22

## 2023-03-06 RX ADMIN — ENOXAPARIN SODIUM 40 MG: 40 INJECTION SUBCUTANEOUS at 16:55

## 2023-03-06 RX ADMIN — ACETAMINOPHEN 1000 MG: 500 TABLET, FILM COATED ORAL at 16:54

## 2023-03-06 ASSESSMENT — PAIN DESCRIPTION - PAIN TYPE: TYPE: ACUTE PAIN;CHRONIC PAIN

## 2023-03-06 ASSESSMENT — ENCOUNTER SYMPTOMS
NERVOUS/ANXIOUS: 0
WHEEZING: 0
NECK PAIN: 0
NAUSEA: 0
SORE THROAT: 0
SPEECH CHANGE: 0
FALLS: 1
DIAPHORESIS: 0
HEARTBURN: 0
BLURRED VISION: 0
BACK PAIN: 0
MYALGIAS: 0
PALPITATIONS: 0
VOMITING: 0
FOCAL WEAKNESS: 0
HEADACHES: 0
FEVER: 0
WEAKNESS: 0
DIARRHEA: 0
DIZZINESS: 0
SENSORY CHANGE: 0
FLANK PAIN: 0
COUGH: 0
ABDOMINAL PAIN: 0
CHILLS: 0
SHORTNESS OF BREATH: 0

## 2023-03-06 NOTE — CARE PLAN
The patient is Stable - Low risk of patient condition declining or worsening    Shift Goals  Clinical Goals: maintain safety  Patient Goals: rest  Family Goals: discharge    Progress made toward(s) clinical / shift goals:    Problem: Pain - Standard  Goal: Alleviation of pain or a reduction in pain to the patient’s comfort goal  Outcome: Progressing     Problem: Knowledge Deficit - Standard  Goal: Patient and family/care givers will demonstrate understanding of plan of care, disease process/condition, diagnostic tests and medications  Outcome: Progressing     Problem: Fall Risk  Goal: Patient will remain free from falls  Outcome: Progressing     Problem: Skin Integrity  Goal: Skin integrity is maintained or improved  Outcome: Progressing       Patient is not progressing towards the following goals:

## 2023-03-06 NOTE — CARE PLAN
The patient is Watcher - Medium risk of patient condition declining or worsening    Shift Goals  Clinical Goals: safety, free from falls  Patient Goals: sleep  Family Goals: safety, pt/ot    Progress made toward(s) clinical / shift goals:    Problem: Pain - Standard  Goal: Alleviation of pain or a reduction in pain to the patient’s comfort goal  Outcome: Progressing     Problem: Knowledge Deficit - Standard  Goal: Patient and family/care givers will demonstrate understanding of plan of care, disease process/condition, diagnostic tests and medications  Outcome: Progressing     Problem: Fall Risk  Goal: Patient will remain free from falls  Outcome: Progressing  Note: Fall precautions in place. Frequent rounding.        Patient is not progressing towards the following goals:

## 2023-03-06 NOTE — PROGRESS NOTES
Removed 2 of 3 facial sutures. There is an area that isn't quite approximated yet and therefore that suture was left in place. MD updated.

## 2023-03-06 NOTE — DISCHARGE PLANNING
Received Choice form at [phone]  Agency/Facility Name: [facility]  Referral sent per Choice form @ [phone]

## 2023-03-07 PROCEDURE — A9270 NON-COVERED ITEM OR SERVICE: HCPCS | Performed by: STUDENT IN AN ORGANIZED HEALTH CARE EDUCATION/TRAINING PROGRAM

## 2023-03-07 PROCEDURE — 770006 HCHG ROOM/CARE - MED/SURG/GYN SEMI*

## 2023-03-07 PROCEDURE — 97530 THERAPEUTIC ACTIVITIES: CPT | Mod: CQ

## 2023-03-07 PROCEDURE — 700102 HCHG RX REV CODE 250 W/ 637 OVERRIDE(OP): Performed by: STUDENT IN AN ORGANIZED HEALTH CARE EDUCATION/TRAINING PROGRAM

## 2023-03-07 PROCEDURE — 99232 SBSQ HOSP IP/OBS MODERATE 35: CPT | Performed by: STUDENT IN AN ORGANIZED HEALTH CARE EDUCATION/TRAINING PROGRAM

## 2023-03-07 PROCEDURE — 97116 GAIT TRAINING THERAPY: CPT | Mod: CQ

## 2023-03-07 PROCEDURE — 700111 HCHG RX REV CODE 636 W/ 250 OVERRIDE (IP): Performed by: STUDENT IN AN ORGANIZED HEALTH CARE EDUCATION/TRAINING PROGRAM

## 2023-03-07 PROCEDURE — 97535 SELF CARE MNGMENT TRAINING: CPT | Mod: CO

## 2023-03-07 PROCEDURE — A9270 NON-COVERED ITEM OR SERVICE: HCPCS | Performed by: FAMILY MEDICINE

## 2023-03-07 PROCEDURE — 700102 HCHG RX REV CODE 250 W/ 637 OVERRIDE(OP): Performed by: FAMILY MEDICINE

## 2023-03-07 RX ADMIN — Medication 5 MG: at 20:44

## 2023-03-07 RX ADMIN — ACETAMINOPHEN 1000 MG: 500 TABLET, FILM COATED ORAL at 17:16

## 2023-03-07 RX ADMIN — SENNOSIDES AND DOCUSATE SODIUM 2 TABLET: 50; 8.6 TABLET ORAL at 17:16

## 2023-03-07 RX ADMIN — ENOXAPARIN SODIUM 40 MG: 40 INJECTION SUBCUTANEOUS at 17:16

## 2023-03-07 RX ADMIN — ACETAMINOPHEN 1000 MG: 500 TABLET, FILM COATED ORAL at 04:01

## 2023-03-07 ASSESSMENT — COGNITIVE AND FUNCTIONAL STATUS - GENERAL
DRESSING REGULAR UPPER BODY CLOTHING: A LITTLE
SUGGESTED CMS G CODE MODIFIER DAILY ACTIVITY: CJ
HELP NEEDED FOR BATHING: A LITTLE
WALKING IN HOSPITAL ROOM: A LITTLE
MOVING FROM LYING ON BACK TO SITTING ON SIDE OF FLAT BED: A LITTLE
TURNING FROM BACK TO SIDE WHILE IN FLAT BAD: A LITTLE
SUGGESTED CMS G CODE MODIFIER MOBILITY: CK
MOBILITY SCORE: 17
STANDING UP FROM CHAIR USING ARMS: A LITTLE
DRESSING REGULAR LOWER BODY CLOTHING: A LITTLE
TOILETING: A LITTLE
CLIMB 3 TO 5 STEPS WITH RAILING: A LOT
MOVING TO AND FROM BED TO CHAIR: A LITTLE
DAILY ACTIVITIY SCORE: 20

## 2023-03-07 ASSESSMENT — GAIT ASSESSMENTS
GAIT LEVEL OF ASSIST: CONTACT GUARD ASSIST
ASSISTIVE DEVICE: 4 WHEEL WALKER
DEVIATION: BRADYKINETIC
DISTANCE (FEET): 150

## 2023-03-07 ASSESSMENT — PAIN DESCRIPTION - PAIN TYPE: TYPE: ACUTE PAIN;CHRONIC PAIN

## 2023-03-07 NOTE — THERAPY
Physical Therapy   Daily Treatment     Patient Name: Chico Best  Age:  89 y.o., Sex:  male  Medical Record #: [id number]  Today's Date: 3/7/2023     Precautions  Precautions: Fall Risk    Assessment    Pt greeted and seen for PT treatment. Pt was able to perform supine>sit w/ Minimal assist and is able to return to bed w/o assist. Pt maintains sitting balance EOB w/o assist. Pt ambulated in hallway w/ 4WW CGA 150ft x2. Pt's daughter reported interest in taking pt to her home and providing 24/7 spv and level of assist needed for ambulation, until pt is accepted to a [facility]. Pt currently limited by impaired balance, weakness, decreased activity tolerance and impaired cognition which negatively impacts functional mobility. Pt will continue to benefit from skilled PT to address deficits.      Plan    Treatment Plan Status: Continue Current Treatment Plan  Type of Treatment: Bed Mobility, Neuro Re-Education / Balance, Gait Training, Therapeutic Activities, Therapeutic Exercise  Treatment Frequency: 3 Times per Week  Treatment Duration: Until Therapy Goals Met    DC Equipment Recommendations: None (owns 4ww)  Discharge Recommendations: Recommend home health for continued physical therapy services (daughter reported able to provide 24/7 spv and level of assist needed until pt is accepted to [facility])       03/07/23 1131   Cognition    Comments slightly confused, agreeable, pleasant   Balance   Sitting Balance (Static) Fair +   Sitting Balance (Dynamic) Fair   Standing Balance (Static) Fair -   Standing Balance (Dynamic) Fair -   Weight Shift Sitting Fair   Weight Shift Standing Fair   Skilled Intervention Verbal Cuing   Comments w/ 4WW   Bed Mobility    Supine to Sit Minimal Assist   Sit to Supine Supervised   Scooting Supervised   Skilled Intervention Verbal Cuing   Gait Analysis   Gait Level Of Assist Contact Guard Assist   Assistive Device 4 Wheel Walker   Distance (Feet) 150   # of Times Distance was Traveled 2   Deviation  Bradykinetic   Skilled Intervention Verbal Cuing   Comments pt able to implement cues to ambulate closer to walker   Functional Mobility   Sit to Stand Contact Guard Assist   Bed, Chair, Wheelchair Transfer Contact Guard Assist   Transfer Method Stand Step   Mobility ambulation in hallway and back   Skilled Intervention Verbal Cuing   Comments pt applies breaks on 4WW properly   Short Term Goals    Short Term Goal # 1 supine to/from sit EOB with use of railings mod I in 6 visits   Goal Outcome # 1 Progressing as expected   Short Term Goal # 2 sit to stand from chair, bed with supervision in 6 visits   Goal Outcome # 2 Progressing as expected   Short Term Goal # 3 ambulation with FWW and  feet in 6 visits   Goal Outcome # 3 Progressing as expected   Supervising Physical Therapist (PTA Treatments Only)   Supervising Physical Therapist Ni Morin

## 2023-03-07 NOTE — PROGRESS NOTES
Hospital Medicine Daily Progress Note    Date of Service  3/6/2023    Chief Complaint  Chico Cedillo is a 89 y.o. male admitted 3/1/2023 with pubic ramus fractures.      Hospital Course  Admitted with Pubic ramus fractures after a ground-level fall.  Patient with known history of dementia, frequent falls.  Orthopedic surgery was consulted on the case and recommended nonoperative intervention.    Interval Problem Update  Pubic fractures - pain well controlled  Elev BP - sbp 106-119  Delirium - mild periods of confusion but easily redirectable    Updates given and plan of care discussed with patient's daughter who was at bedside.    I have discussed this patient's plan of care and discharge plan at IDT rounds today with Case Management, Nursing, Nursing leadership, and other members of the IDT team.    Consultants/Specialty  orthopedics    Code Status  DNAR/DNI    Disposition  Patient is medically cleared for discharge.   Anticipate discharge to to skilled nursing facility.  I have placed the appropriate orders for post-discharge needs.    Review of Systems  Review of Systems   Constitutional:  Negative for chills, diaphoresis, fever and malaise/fatigue.   HENT:  Negative for congestion, hearing loss and sore throat.    Eyes:  Negative for blurred vision.   Respiratory:  Negative for cough, shortness of breath and wheezing.    Cardiovascular:  Negative for chest pain, palpitations and leg swelling.   Gastrointestinal:  Negative for abdominal pain, diarrhea, heartburn, nausea and vomiting.   Genitourinary:  Negative for dysuria, flank pain and hematuria.   Musculoskeletal:  Positive for falls and joint pain. Negative for back pain, myalgias and neck pain.   Skin:  Negative for rash.   Neurological:  Negative for dizziness, sensory change, speech change, focal weakness, weakness and headaches.   Psychiatric/Behavioral:  The patient is not nervous/anxious.       Physical Exam  Temp:  [36.2 °C (97.1 °F)-36.7 °C (98 °F)] 36.5  °C (97.7 °F)  Pulse:  [66-86] 72  Resp:  [18] 18  BP: (106-119)/(56-67) 114/56  SpO2:  [93 %-97 %] 93 %    Physical Exam  Vitals and nursing note reviewed.   HENT:      Head: Normocephalic.      Comments: Laceration at left cheek     Nose: No congestion.      Mouth/Throat:      Mouth: Mucous membranes are moist.   Eyes:      Extraocular Movements: Extraocular movements intact.      Conjunctiva/sclera: Conjunctivae normal.   Cardiovascular:      Rate and Rhythm: Normal rate and regular rhythm.   Pulmonary:      Effort: Pulmonary effort is normal.      Breath sounds: Normal breath sounds.   Abdominal:      General: There is no distension.      Tenderness: There is no abdominal tenderness. There is no guarding or rebound.   Musculoskeletal:      Cervical back: No tenderness.      Right lower leg: No edema.      Left lower leg: No edema.   Skin:     General: Skin is warm and dry.   Neurological:      General: No focal deficit present.      Mental Status: He is alert.      Cranial Nerves: No cranial nerve deficit.      Comments: Oriented to person   Psychiatric:         Speech: Speech is tangential.         Cognition and Memory: Memory is impaired. He exhibits impaired recent memory.       Fluids    Intake/Output Summary (Last 24 hours) at 3/6/2023 1633  Last data filed at 3/6/2023 1300  Gross per 24 hour   Intake 600 ml   Output --   Net 600 ml         Laboratory  Recent Labs     03/04/23  0824   WBC 3.7*   RBC 4.24*   HEMOGLOBIN 13.4*   HEMATOCRIT 39.5*   MCV 93.2   MCH 31.6   MCHC 33.9   RDW 46.5   PLATELETCT 148*   MPV 9.9       Recent Labs     03/04/23  0824   SODIUM 141   POTASSIUM 4.1   CHLORIDE 106   CO2 25   GLUCOSE 92   BUN 21   CREATININE 1.03   CALCIUM 9.1                       Imaging  CT-PELVIS W/O PLUS RECONS   Final Result      1.  Acute on chronic right inferior pubic ramus fracture.      2.  Apparent acute nondisplaced right superior pubic ramus fracture/fracture the anterior column of the acetabulum.       3.  Bladder stones.      4.  Bilateral inguinal hernias, right greater than left.      5.  Diverticulosis without diverticulitis      DX-PELVIS-1 OR 2 VIEWS   Final Result      1.  No evidence of pelvic fracture or dislocation.      2.  Ovoid calcification superior to the pubic symphysis which may represent a calcified lymph node or a bladder stone or prostatic calcification.      DX-FEMUR-2+ RIGHT   Final Result      Negative femur series.             Assessment/Plan  * Pubic Rami Fracture- (present on admission)  Assessment & Plan  Pain control  PT and OT   WBAT    Delirium- (present on admission)  Assessment & Plan  Avoid Benzodiazepines and Anticholinergics  Frequent orientation  Open window blinds during the day  Avoid naps during the day  Family at bedside whenever possible  Ensure adequate sleep at night - use Melatonin preferably  Avoid early morning labs  Avoid vital signs during sleep  Ambulate if possible  Provide adequate pain control  Monitor for urinary retention  Prevent and manage constipation  Discontinue IVs, Catheters, Tubes, Lines, Cardiac monitors, SCDs if possible      Frequent falls- (present on admission)  Assessment & Plan  PT and OT    Hypomagnesemia- (present on admission)  Assessment & Plan  Follow level    Hypokalemia- (present on admission)  Assessment & Plan  Follow bmp    Elevated BP without diagnosis of hypertension- (present on admission)  Assessment & Plan  IV Labetalol as needed with parameters    Vitamin B12 deficiency- (present on admission)  Assessment & Plan  wnl    Bilateral inguinal hernia- (present on admission)  Assessment & Plan  monitor    Bladder stone- (present on admission)  Assessment & Plan  Monitor for urinary retention    Facial laceration- (present on admission)  Assessment & Plan  repaired 2/27/23  Wound care    Late onset Alzheimer dementia (HCC)- (present on admission)  Assessment & Plan  Avoid Benzodiazepines and Anticholinergics  Frequent  orientation  Open window blinds during the day  Avoid naps during the day  Family at bedside whenever possible  Ensure adequate sleep at night - use Melatonin preferably  Avoid early morning labs  Avoid vital signs during sleep  Ambulate if possible  Provide adequate pain control  Monitor for urinary retention  Prevent and manage constipation  Discontinue IVs, Catheters, Tubes, Lines, Cardiac monitors, SCDs if possible    Cardiac pacemaker in situ- (present on admission)  Assessment & Plan  monitor         VTE prophylaxis: enoxaparin ppx    I have performed a physical exam and reviewed and updated ROS and Plan today (3/6/2023). In review of yesterday's note (3/5/2023), there are no changes except as documented above.

## 2023-03-07 NOTE — PROGRESS NOTES
Hospital Medicine Daily Progress Note    Date of Service  3/7/2023    Chief Complaint  Chico Cedillo is a 89 y.o. male admitted 3/1/2023 with pubic ramus fracture    Hospital Course  89-year-old female with past medical history of liver disease with frequent falls noted to have pubic ramus fracture.  Orthopedics recommended nonoperative management.  PT/OT recommending postacute placement.   assisting placement      Interval Problem Update  3/7/2023  Vitals remained stable  Pain well controlled  Patient remained disoriented  Labs reviewed, meds reviewed   assisting with discharge plan      I have discussed this patient's plan of care and discharge plan at IDT rounds today with Case Management, Nursing, Nursing leadership, and other members of the IDT team.    Consultants/Specialty  orthopedics    Code Status  DNAR/DNI    Disposition  Patient is medically cleared for discharge.   Anticipate discharge to to skilled nursing facility.  I have placed the appropriate orders for post-discharge needs.    Review of Systems  Review of Systems   Unable to perform ROS: Dementia      Physical Exam  Temp:  [36.1 °C (96.9 °F)-36.6 °C (97.8 °F)] 36.1 °C (96.9 °F)  Pulse:  [60-72] 68  Resp:  [16-18] 18  BP: (114-137)/(56-66) 131/61  SpO2:  [93 %-96 %] 93 %    Physical Exam  Constitutional:       General: He is not in acute distress.  HENT:      Head: Normocephalic and atraumatic.      Right Ear: Tympanic membrane normal.      Left Ear: Tympanic membrane normal.      Nose: Nose normal.      Mouth/Throat:      Mouth: Mucous membranes are moist.   Eyes:      Extraocular Movements: Extraocular movements intact.      Pupils: Pupils are equal, round, and reactive to light.   Cardiovascular:      Rate and Rhythm: Normal rate and regular rhythm.      Pulses: Normal pulses.   Pulmonary:      Effort: Pulmonary effort is normal. No respiratory distress.   Abdominal:      General: Abdomen is flat. There is no distension.    Musculoskeletal:      Cervical back: Normal range of motion.      Right lower leg: No edema.      Left lower leg: No edema.   Skin:     General: Skin is warm.   Neurological:      General: No focal deficit present.      Mental Status: He is alert. Mental status is at baseline. He is disoriented.   Psychiatric:         Mood and Affect: Mood normal.       Fluids    Intake/Output Summary (Last 24 hours) at 3/7/2023 1359  Last data filed at 3/7/2023 0900  Gross per 24 hour   Intake 360 ml   Output 350 ml   Net 10 ml       Laboratory                        Imaging  CT-PELVIS W/O PLUS RECONS   Final Result      1.  Acute on chronic right inferior pubic ramus fracture.      2.  Apparent acute nondisplaced right superior pubic ramus fracture/fracture the anterior column of the acetabulum.      3.  Bladder stones.      4.  Bilateral inguinal hernias, right greater than left.      5.  Diverticulosis without diverticulitis      DX-PELVIS-1 OR 2 VIEWS   Final Result      1.  No evidence of pelvic fracture or dislocation.      2.  Ovoid calcification superior to the pubic symphysis which may represent a calcified lymph node or a bladder stone or prostatic calcification.      DX-FEMUR-2+ RIGHT   Final Result      Negative femur series.           Assessment/Plan  * Pubic Rami Fracture- (present on admission)  Assessment & Plan  Pain control  PT and OT   WBAT    Hypomagnesemia- (present on admission)  Assessment & Plan  Follow level    Hypokalemia- (present on admission)  Assessment & Plan  Follow bmp    Elevated BP without diagnosis of hypertension- (present on admission)  Assessment & Plan  IV Labetalol as needed with parameters    Vitamin B12 deficiency- (present on admission)  Assessment & Plan  wnl    Delirium- (present on admission)  Assessment & Plan  Avoid Benzodiazepines and Anticholinergics  Frequent orientation  Open window blinds during the day  Avoid naps during the day  Family at bedside whenever possible  Ensure  adequate sleep at night - use Melatonin preferably  Avoid early morning labs  Avoid vital signs during sleep  Ambulate if possible  Provide adequate pain control  Monitor for urinary retention  Prevent and manage constipation  Discontinue IVs, Catheters, Tubes, Lines, Cardiac monitors, SCDs if possible      Bilateral inguinal hernia- (present on admission)  Assessment & Plan  monitor    Bladder stone- (present on admission)  Assessment & Plan  Monitor for urinary retention    Facial laceration- (present on admission)  Assessment & Plan  repaired 2/27/23  Wound care    Frequent falls- (present on admission)  Assessment & Plan  PT and OT    Late onset Alzheimer dementia (HCC)- (present on admission)  Assessment & Plan  Avoid Benzodiazepines and Anticholinergics  Frequent orientation  Open window blinds during the day  Avoid naps during the day  Family at bedside whenever possible  Ensure adequate sleep at night - use Melatonin preferably  Avoid early morning labs  Avoid vital signs during sleep  Ambulate if possible  Provide adequate pain control  Monitor for urinary retention  Prevent and manage constipation  Discontinue IVs, Catheters, Tubes, Lines, Cardiac monitors, SCDs if possible    Cardiac pacemaker in situ- (present on admission)  Assessment & Plan  monitor         VTE prophylaxis: SCDs/TEDs and enoxaparin ppx    I have performed a physical exam and reviewed and updated ROS and Plan today (3/7/2023). In review of yesterday's note (3/6/2023), there are no changes except as documented above.

## 2023-03-07 NOTE — CARE PLAN
The patient is Stable - Low risk of patient condition declining or worsening    Shift Goals  Clinical Goals: safety  Patient Goals: sleep  Family Goals: discharge    Progress made toward(s) clinical / shift goals:    Problem: Pain - Standard  Goal: Alleviation of pain or a reduction in pain to the patient’s comfort goal  Outcome: Progressing     Problem: Knowledge Deficit - Standard  Goal: Patient and family/care givers will demonstrate understanding of plan of care, disease process/condition, diagnostic tests and medications  Outcome: Progressing     Problem: Fall Risk  Goal: Patient will remain free from falls  Outcome: Progressing       Patient is not progressing towards the following goals:

## 2023-03-07 NOTE — CARE PLAN
The patient is Stable - Low risk of patient condition declining or worsening    Shift Goals  Clinical Goals: Safety  Patient Goals: rest  Family Goals: discharge    Progress made toward(s) clinical / shift goals:    Problem: Pain - Standard  Goal: Alleviation of pain or a reduction in pain to the patient’s comfort goal  Outcome: Progressing  Note: No Pain this shift. Patient maintained 0 pain through out the shift        Problem: Knowledge Deficit - Standard  Goal: Patient and family/care givers will demonstrate understanding of plan of care, disease process/condition, diagnostic tests and medications  Outcome: Progressing  Note: Patient and family working to help case management for discharge       Patient is not progressing towards the following goals:

## 2023-03-07 NOTE — DISCHARGE PLANNING
Agency/Facility Name: [facility]  Spoke To: [facility]  Outcome: Will review the referral and give this DPA a call back.

## 2023-03-08 VITALS
DIASTOLIC BLOOD PRESSURE: 72 MMHG | HEIGHT: 62 IN | OXYGEN SATURATION: 97 % | BODY MASS INDEX: 24.34 KG/M2 | HEART RATE: 68 BPM | WEIGHT: 132.28 LBS | TEMPERATURE: 96.9 F | RESPIRATION RATE: 18 BRPM | SYSTOLIC BLOOD PRESSURE: 120 MMHG

## 2023-03-08 PROCEDURE — 700102 HCHG RX REV CODE 250 W/ 637 OVERRIDE(OP): Performed by: FAMILY MEDICINE

## 2023-03-08 PROCEDURE — 86480 TB TEST CELL IMMUN MEASURE: CPT

## 2023-03-08 PROCEDURE — A9270 NON-COVERED ITEM OR SERVICE: HCPCS | Performed by: STUDENT IN AN ORGANIZED HEALTH CARE EDUCATION/TRAINING PROGRAM

## 2023-03-08 PROCEDURE — A9270 NON-COVERED ITEM OR SERVICE: HCPCS | Performed by: FAMILY MEDICINE

## 2023-03-08 PROCEDURE — 99239 HOSP IP/OBS DSCHRG MGMT >30: CPT | Performed by: STUDENT IN AN ORGANIZED HEALTH CARE EDUCATION/TRAINING PROGRAM

## 2023-03-08 PROCEDURE — 700102 HCHG RX REV CODE 250 W/ 637 OVERRIDE(OP): Performed by: STUDENT IN AN ORGANIZED HEALTH CARE EDUCATION/TRAINING PROGRAM

## 2023-03-08 RX ADMIN — ACETAMINOPHEN 1000 MG: 500 TABLET, FILM COATED ORAL at 04:05

## 2023-03-08 RX ADMIN — SENNOSIDES AND DOCUSATE SODIUM 2 TABLET: 50; 8.6 TABLET ORAL at 04:05

## 2023-03-08 ASSESSMENT — PAIN DESCRIPTION - PAIN TYPE: TYPE: ACUTE PAIN;CHRONIC PAIN

## 2023-03-08 NOTE — FACE TO FACE
Face to Face Supporting Documentation - Home Health    The encounter with this patient was in whole or in part the primary reason for home health admission.    Date of encounter:   Patient:                    MRN:                       YOB: 2023  Chico Cedillo  [id number]  8/27/1932     Home health to see patient for:  Skilled Nursing care for assessment, interventions & education, Physical Therapy evaluation and treatment, and Occupational therapy evaluation and treatment    Skilled need for:  farcture    Skilled nursing interventions to include:  Comment: PT/OT    Homebound status evidenced by:  Need the aid of supportive devices such as crutches, canes, wheelchairs or walkers. Leaving home requires a considerable and taxing effort. There is a normal inability to leave the home.    Community Physician to provide follow up care: Gely Rose M.D.     Optional Interventions? No      I certify the face to face encounter for this home health care referral meets the CMS requirements and the encounter/clinical assessment with the patient was, in whole, or in part, for the medical condition(s) listed above, which is the primary reason for home health care. Based on my clinical findings: the service(s) are medically necessary, support the need for home health care, and the homebound criteria are met.  I certify that this patient has had a face to face encounter by the clinical nurse specialist working collaboratively with me.  Charles Damon M.D. - NPI: [id number]

## 2023-03-08 NOTE — DISCHARGE SUMMARY
Discharge Summary    CHIEF COMPLAINT ON ADMISSION  Chief Complaint   Patient presents with    Leg Pain     Pt states he fell Monday and hit is head. He then fell again on Tuesday and hurt his R leg. Today pt endorses worsening R thigh and groin pain. CMS intact       Reason for Admission  fell, groin pain      Admission Date  3/1/2023    CODE STATUS  DNAR/DNI    HPI & HOSPITAL COURSE    89-year-old female with past medical history of liver disease with frequent falls noted to have pubic ramus fracture.  Orthopedics recommended nonoperative management.  PT/OT recommending postacute placement.   assisting placement.  He was accepted at assisting living facility .he will be getting Physical therapy at the facility .  At the time of discharge patient remain  hemodynamically stable ,asymptomatic ,labs remain unremarkable .  Patient will be discharged with close follow up with PCP .  Advised for medicine compliance.Discharge plan was discussed with patient in details .  Patient agreed with discharge plan  and  all questions answered.      Therefore, he is discharged in good and stable condition Assisted living facility .  The patient met 2-midnight criteria for an inpatient stay at the time of discharge.    Discharge Date  3/8/2023          DISCHARGE DIAGNOSES  Principal Problem:    Pubic Rami Fracture POA: Yes  Active Problems:    Cardiac pacemaker in situ POA: Yes    Late onset Alzheimer dementia (HCC) POA: Yes    Frequent falls POA: Yes    Facial laceration POA: Yes    Bladder stone POA: Yes    Bilateral inguinal hernia POA: Yes    Delirium POA: Yes    Vitamin B12 deficiency POA: Yes    Elevated BP without diagnosis of hypertension POA: Yes    Hypokalemia POA: Yes    Hypomagnesemia POA: Yes  Resolved Problems:    * No resolved hospital problems. *      FOLLOW UP  Future Appointments   Date Time Provider Department Center   6/27/2023  1:20 PM Jason Hearn M.D. [facility] None     No follow-up provider  specified.    MEDICATIONS ON DISCHARGE     Medication List        CONTINUE taking these medications        Instructions   acetaminophen 500 MG Tabs  Commonly known as: TYLENOL   Take 1,000 mg by mouth 2 times a day as needed for Mild Pain.  Dose: 1,000 mg     mupirocin 2 % Oint  Commonly known as: BACTROBAN   Apply 1 Application topically 2 times a day.  Dose: 1 Application            STOP taking these medications      cephALEXin 500 MG Caps  Commonly known as: KEFLEX              Allergies  No Known Allergies    DIET  Orders Placed This Encounter   Procedures    Diet Order Diet: Regular     Standing Status:   Standing     Number of Occurrences:   1     Order Specific Question:   Diet:     Answer:   Regular [1]       ACTIVITY  As tolerated.  Weight bearing as tolerated    CONSULTATIONS  Orthopaedics     PROCEDURES  CT-PELVIS W/O PLUS RECONS   Final Result      1.  Acute on chronic right inferior pubic ramus fracture.      2.  Apparent acute nondisplaced right superior pubic ramus fracture/fracture the anterior column of the acetabulum.      3.  Bladder stones.      4.  Bilateral inguinal hernias, right greater than left.      5.  Diverticulosis without diverticulitis      DX-PELVIS-1 OR 2 VIEWS   Final Result      1.  No evidence of pelvic fracture or dislocation.      2.  Ovoid calcification superior to the pubic symphysis which may represent a calcified lymph node or a bladder stone or prostatic calcification.      DX-FEMUR-2+ RIGHT   Final Result      Negative femur series.            LABORATORY  Lab Results   Component Value Date    SODIUM 141 03/04/2023    POTASSIUM 4.1 03/04/2023    CHLORIDE 106 03/04/2023    CO2 25 03/04/2023    GLUCOSE 92 03/04/2023    BUN 21 03/04/2023    CREATININE 1.03 03/04/2023    CREATININE 1.1 09/17/2008        Lab Results   Component Value Date    WBC 3.7 (L) 03/04/2023    HEMOGLOBIN 13.4 (L) 03/04/2023    HEMATOCRIT 39.5 (L) 03/04/2023    PLATELETCT 148 (L) 03/04/2023        Total  time of the discharge process exceeds 37 minutes.

## 2023-03-08 NOTE — DISCHARGE PLANNING
Case Management Discharge Planning    Admission Date: 3/1/2023  GMLOS: 2.7  ALOS: 7    6-Clicks ADL Score: 20  6-Clicks Mobility Score: 17  PT and/or OT Eval ordered: Yes  Post-acute Referrals Ordered: Yes  Post-acute Choice Obtained: Yes  Has referral(s) been sent to post-acute provider:  Yes      Anticipated Discharge Dispo: Discharge Disposition:   D/T to facility providing jail/supportive care (04) with home health  Discharge Address: [address] [place], [place] [address]  [address]  Discharge Contact Phone Number: [phone]    DME Needed: No    Action(s) Taken:   Pt accepted by [facility].  They requested home health.  Pt and daughter, Gwen agreeable.  Consent obtained to send referrals to [facility], [facility] and [facility].  Choice form faxed to [facility].  Referral sent to [facility] via [employer] by RN KARLY.  Pt does not need to wait for home health acceptance per Dr. Damon.    Medically Clear: Yes    Next Steps: DC    Barriers to Discharge: None

## 2023-03-08 NOTE — PROGRESS NOTES
1 suture remaining from laceration of left cheek and removed prior to discharge. Pt tolerated well. Family at bedside.

## 2023-03-08 NOTE — PROGRESS NOTES
Received report from off-going nurse.   Assumed pt care at change of shift.   Assessment completed.   Pt is A&Ox 4, vital signs are stable on room air.   Denies pain, no apparent signs of discomfort.   Bed is in low and locked position, call light and belongings in reach, reminded to use call light, bed alarm on.   No needs at this time.

## 2023-03-08 NOTE — THERAPY
Occupational Therapy  Daily Treatment     Patient Name: Chico Cedillo  Age:  89 y.o., Sex:  male  Medical Record #: [id number]  Today's Date: 3/7/2023       Precautions: Fall Risk    Assessment    Pt seen for OT tx. Continues to be limited by impaired safety awareness and balance deficits impacting ability to complete ADLs and ADL transfers independently. Supv supine > sit, min A to don pants, amb w/ 4WW and SBA in room. Completed toilet transfer w/ CGA. CGA standing at the sink to groom. Pt pleasantly confused during session but redirectable. Pt's daughter present stating that the [facility] is coming to assess pt tomorrow. Will continue to follow while in house.     Plan    Treatment Plan Status: Continue Current Treatment Plan    DC Equipment Recommendations: None  Discharge Recommendations: Other - D/C to [facility]       03/07/23 1425   Cognition    Cognition / Consciousness X   Safety Awareness Impaired   New Learning Impaired   Comments confused but pleasant and cooperative. redirectable   Balance   Sitting Balance (Static) Fair +   Sitting Balance (Dynamic) Fair +   Standing Balance (Static) Fair -   Standing Balance (Dynamic) Fair -   Weight Shift Sitting Good   Weight Shift Standing Fair   Bed Mobility    Supine to Sit Supervised   Sit to Supine Supervised   Scooting Supervised   Rolling Supervised   Activities of Daily Living   Grooming Contact Guard Assist;Standing   Upper Body Dressing Supervision   Lower Body Dressing Contact Guard Assist   Toileting Contact Guard Assist   Functional Mobility   Sit to Stand Contact Guard Assist   Bed, Chair, Wheelchair Transfer Contact Guard Assist   Toilet Transfers Contact Guard Assist   Short Term Goals   Short Term Goal # 1 Pt will complete ADL transfers with supervision   Goal Outcome # 1 Progressing as expected   Short Term Goal # 2 Pt will complete LB dressing with supervision   Goal Outcome # 2 Progressing as expected   Short Term Goal # 3 Pt will complete toileting with  supervision   Goal Outcome # 3 Progressing as expected   Anticipated Discharge Equipment and Recommendations   DC Equipment Recommendations None   Discharge Recommendations Other -

## 2023-03-08 NOTE — CARE PLAN
The patient is Stable - Low risk of patient condition declining or worsening    Shift Goals  Clinical Goals: safety, skin integrity  Patient Goals: rest  Family Goals: discharge    Progress made toward(s) clinical / shift goals:  Monitoring VS, skin integrity, bed alarm on.     Patient is not progressing towards the following goals: N/A    Problem: Pain - Standard  Goal: Alleviation of pain or a reduction in pain to the patient’s comfort goal  Outcome: Progressing     Problem: Fall Risk  Goal: Patient will remain free from falls  Outcome: Progressing

## 2023-03-08 NOTE — DISCHARGE PLANNING
Case Management Discharge Planning    Admission Date: 3/1/2023  GMLOS: 2.7  ALOS: 7    6-Clicks ADL Score: 20  6-Clicks Mobility Score: 17  PT and/or OT Eval ordered: Yes  Post-acute Referrals Ordered: Yes  Post-acute Choice Obtained: Yes  Has referral(s) been sent to post-acute provider:  Yes      Anticipated Discharge Dispo: Discharge Disposition: D/T to facility providing penitentiary/supportive care (04)  Discharge Address: [address] [place], [place] [address] [place]  [address]  Discharge Contact Phone Number: [phone]    DME Needed: No    Action(s) Taken:   RN KARLY met with patient and daughter, Gwen at bedside today.  Pt was living alone in a senior group home community in a one story house in [place].  He uses a cane or FWW.  Patient and Gwen have coordinated for [facility] to come to [facility] today to do an intake assessment for placement at 1000.  Pt had a Tb test done 2 weeks ago and it came back negative.  The last of his furniture will be delivered tomorrow.  Pt would be able to dc today if [facility] accepts.  Voalte msg to Dr. Damon.      Medically Clear: Yes 3-3-23    Next Steps: Follow up with [facility], patient and daughter today.    Barriers to Discharge: Pending Placement    Care Transition Team Assessment    Information Source  Orientation Level: Oriented to person  Information Given By: Patient, Relative  Informant's Name: Chico Mcfarlane    Readmission Evaluation  Is this a readmission?: No    Elopement Risk  Legal Hold: No  Ambulatory or Self Mobile in Wheelchair: No-Not an Elopement Risk  Elopement Risk: Not at Risk for Elopement    Interdisciplinary Discharge Planning  Lives with - Patient's Self Care Capacity: Alone and Unable to Care For Self  Patient or legal guardian wants to designate a caregiver: No  Support Systems: Family Member(s), Children  Housing / Facility: 1 Story House  Prior Services: Home-Independent  Durable Medical Equipment: Walker    Discharge  Preparedness  What is your plan after discharge?: Home with help  What are your discharge supports?: Child  Prior Functional Level: Ambulatory, Needs Assist with Activities of Daily Living, Needs Assist with Medication Management, Uses Walker, Uses Cane  Difficulity with ADLs: Bathing, Dressing, Toileting, Walking  Difficulity with IADLs: Cooking, Driving, Keeping track of finances, Laundry, Managing medication, Shopping, Using the telephone or computer    Functional Assesment  Prior Functional Level: Ambulatory, Needs Assist with Activities of Daily Living, Needs Assist with Medication Management, Uses Walker, Uses Cane    Finances  Financial Barriers to Discharge: No  Prescription Coverage: Yes    Vision / Hearing Impairment  Vision Impairment : Yes  Right Eye Vision: Impaired, Wears Glasses  Left Eye Vision: Impaired, Wears Glasses  Hearing Impairment : Yes  Hearing Impairment: Both Ears, Hearing Device Not Available  Does Pt Need Special Equipment for the Hearing Impaired?: No         Advance Directive  Advance Directive?: POL, DPOA for Health Care  Durable Power of  Name and Contact : Gwen Mcfarlane    Domestic Abuse  Have you ever been the victim of abuse or violence?: No  Physical Abuse or Sexual Abuse: No  Verbal Abuse or Emotional Abuse: No  Possible Abuse/Neglect Reported to:: Not Applicable    Psychological Assessment  History of Substance Abuse: None    Discharge Risks or Barriers  Discharge risks or barriers?: No    Anticipated Discharge Information  Discharge Disposition: D/T to facility providing correction/supportive care (04)  Discharge Address: [address] [place] [address] [place]  [address]  Discharge Contact Phone Number: [phone]

## 2023-03-09 ENCOUNTER — PATIENT OUTREACH (OUTPATIENT)
Dept: MEDICAL GROUP | Facility: PHYSICIAN GROUP | Age: 88
End: 2023-03-09
Payer: MEDICARE

## 2023-03-09 LAB
GAMMA INTERFERON BACKGROUND BLD IA-ACNC: 0.04 IU/ML
M TB IFN-G BLD-IMP: NEGATIVE
M TB IFN-G CD4+ BCKGRND COR BLD-ACNC: 0 IU/ML
MITOGEN IGNF BCKGRD COR BLD-ACNC: >10 IU/ML
QFT TB2 - NIL TBQ2: 0 IU/ML

## 2023-03-09 NOTE — PROGRESS NOTES
Subjective:     Chico Cedillo is a 89 y.o. male who presents for Hospital Follow-up.    POST DISCHARGE CALL:  Discharge Date:3/8/2023   Date of Outreach Call: 3/9/2023  8:42 AM  Now that you're home, how are you doing? Good  Did you receive any new prescriptions? No  Were you able to fill those medications? Yes  How did you fill those prescriptions? *  If not able to fill prescriptions, why? *    Do you have questions about your medications? No  Do you have a follow-up appointment scheduled?Yes  Any issues or paperwork you wish to discuss with your PCP? none  Does patient qualify for CCM Program? No  If patient qualifies, was CCM Program Introduced? *  If patient does not qualify, comment? *  Number of Attempts: 1  Current or previous attempts completed within two business days of discharge? Yes  Provided education regarding treatment plan, medication, self-management, ADLs? Yes  Has patient completed Advance Directive? If yes, advise them to bring to appointment. Yes  Care Manager phone number provided? Yes  Is there anything else I can help you with? No  Chief Complaint? Leg pain  Admitting Dx? Fell; Groin pain  Discharge Diagnosis? Pubic Rami fracture  Additional Comments? n/a    HPI:   Recently hospitalized for ***    Current medicines (including reconciliation performed today)  Current Outpatient Medications   Medication Sig Dispense Refill    acetaminophen (TYLENOL) 500 MG Tab Take 1,000 mg by mouth 2 times a day as needed for Mild Pain.      mupirocin (BACTROBAN) 2 % Ointment Apply 1 Application topically 2 times a day. 22 g 0     No current facility-administered medications for this visit.       Allergies:   Patient has no known allergies.    Social History     Tobacco Use    Smoking status: Never    Smokeless tobacco: Never   Vaping Use    Vaping Use: Never used   Substance Use Topics    Alcohol use: No     Alcohol/week: 0.0 oz    Drug use: No       ROS:  ***    Objective:     There were no vitals filed for  this visit.  There is no height or weight on file to calculate BMI.    Physical Exam:  ***    Assessment and Plan:   There are no diagnoses linked to this encounter.    - Chart and discharge summary were reviewed.   - Hospitalization and results reviewed with patient.   - Medications reviewed including instructions regarding high risk medications, dosing and side effects.  - Recommended Services: {COORDINATION OF SERVICES:20405}  - Advance directive/POLST on file?  {Yes/No:20266}    Follow-up:No follow-ups on file.    Face-to-face transitional care management services with {TCM Complexity:94294} medical decision complexity were provided.     LACE+ Historical Score Over Time (0-28: Low, 29-58: Medium, 59+: High): 84      {                                                      reference text will not save to note  Coding guide   87728        - face-to-face within 14 day        - moderate decision complexity  22179       - face-to-face within 7 days       - high medical decision complexity    :36236}

## 2023-03-09 NOTE — PROGRESS NOTES
Patient outreach for TCM follow up.  Spoke to patient's daughter-in-law, Helga.  Reviewed discharge instructions and current medications. Helga verbalized understanding.  [facility] appts not available with PCP within 14 days.  Helga declined to schedule with another provider for f/u.  Scheduled appointment with PCP for 04/05/23 .

## 2023-03-14 NOTE — DISCHARGE PLANNING
Agency/Facility Name: [facility]  Outcome: DPA left a voice message for Gillian.  DPA is checking to see if the referral was accepted.  Awaiting a call back.    @[phone]  Agency/Facility Name: [facility]  Spoke To: Gillian  Outcome: Referral approved.

## 2023-04-05 ENCOUNTER — OFFICE VISIT (OUTPATIENT)
Dept: MEDICAL GROUP | Facility: PHYSICIAN GROUP | Age: 88
End: 2023-04-05
Payer: COMMERCIAL

## 2023-04-05 VITALS
DIASTOLIC BLOOD PRESSURE: 72 MMHG | BODY MASS INDEX: 23.85 KG/M2 | HEIGHT: 62 IN | TEMPERATURE: 97.5 F | OXYGEN SATURATION: 95 % | RESPIRATION RATE: 16 BRPM | SYSTOLIC BLOOD PRESSURE: 114 MMHG | HEART RATE: 48 BPM | WEIGHT: 129.6 LBS

## 2023-04-05 DIAGNOSIS — S32.501D CLOSED NONDISPLACED FRACTURE OF RIGHT PUBIS WITH ROUTINE HEALING, SUBSEQUENT ENCOUNTER: ICD-10-CM

## 2023-04-05 DIAGNOSIS — D61.818 PANCYTOPENIA (HCC): ICD-10-CM

## 2023-04-05 PROCEDURE — 99213 OFFICE O/P EST LOW 20 MIN: CPT | Performed by: INTERNAL MEDICINE

## 2023-04-05 PROCEDURE — 3074F SYST BP LT 130 MM HG: CPT | Performed by: INTERNAL MEDICINE

## 2023-04-05 PROCEDURE — 3078F DIAST BP <80 MM HG: CPT | Performed by: INTERNAL MEDICINE

## 2023-04-05 ASSESSMENT — FIBROSIS 4 INDEX: FIB4 SCORE: 3.87

## 2023-04-06 RX ORDER — ACETAMINOPHEN 500 MG
500-1000 TABLET ORAL EVERY 6 HOURS PRN
Qty: 30 TABLET | Refills: 0 | Status: SHIPPED
Start: 2023-04-06 | End: 2023-04-10

## 2023-04-07 NOTE — PROGRESS NOTES
"Subjective:     CC:   Chief Complaint   Patient presents with    Follow-Up     3/1/23, Dx with pubic rami fracture, [facility], pain has subsided since, everything is going smoothly no concerns, mobility increased, Home Health, OT, PT have been very helpful         HPI:   [place] presents today for follow-up visit. The patient was admitted from 3/1/2023 to 3/8/2023 for a pubic ramus fracture following a ground level fall.  Orthopedics was consulted and recommended nonoperative management.  Patient was discharged to [facility] assisted living facility and has been receiving PT/OT.  He reports that PT has been very effective.  His mobility has increased.  He is mostly without pain and has been receiving Tylenol 1000 mg twice daily as needed.        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 Samuel    Urinary incontinence 01/27/2021       Social History     Tobacco Use    Smoking status: Never    Smokeless tobacco: Never   Vaping Use    Vaping Use: Never used   Substance Use Topics    Alcohol use: No     Alcohol/week: 0.0 oz    Drug use: No       Current Outpatient Medications Ordered in Epic   Medication Sig Dispense Refill    acetaminophen (TYLENOL) 500 MG Tab Take 2 Tablets by mouth every 6 hours as needed for Mild Pain. 30 Tablet 11    mupirocin (BACTROBAN) 2 % Ointment Apply 1 Application topically 2 times a day. 22 g 0     No current Epic-ordered facility-administered medications on file.       Allergies:  Patient has no known allergies.    Health Maintenance: Completed    Review of Systems:  No fevers or chills. No cough, chest pain, or shortness of breath.       Objective:       Exam:  /72   Pulse (!) 48   Temp 36.4 °C (97.5 °F) (Temporal)   Resp 16   Ht 1.575 m (5' 2\")   Wt 58.8 kg (129 lb 9.6 oz)   SpO2 95%   BMI 23.70 kg/m²  Body mass index is " 23.7 kg/m².    Gen: Alert and oriented, No apparent distress.  Gait: Normal, nonantalgic        Assessment & Plan:     89 y.o. male with the following -       1. Closed nondisplaced fracture of right pubis with routine healing, subsequent encounter  Chronic condition, improved.  The patient was recently admitted from 3/1/2023 to 3/8/2023 for a pubic ramus fracture following a ground level fall.  Orthopedics was consulted and recommended nonoperative management.  Patient was discharged to [facility] living [facility] and has been receiving PT/OT.  He reports that PT has been very effective.  His mobility has increased.  He is mostly without pain and has been receiving Tylenol 1000 mg twice daily as needed.  -Continue Tylenol 1000 mg twice daily as needed for pain    2. Pancytopenia (HCC)  Chronic condition, stable.  Most recent labs from 3/4/2023 showed a reduced WBC count of 3.7, reduced hemoglobin and hematocrit of 13.4 and 39.5, and reduced platelet count of 140.  The patient has not signs or symptoms of excessive bleeding.  -Continue to monitor with serial CBCs      Return if symptoms worsen or fail to improve.    Please note that this dictation was created using voice recognition software. I have made every reasonable attempt to correct obvious errors, but I expect that there are errors of grammar and possibly content that I did not discover before finalizing the note.

## 2023-04-10 ENCOUNTER — TELEPHONE (OUTPATIENT)
Dept: MEDICAL GROUP | Facility: PHYSICIAN GROUP | Age: 88
End: 2023-04-10
Payer: MEDICARE

## 2023-04-10 RX ORDER — ACETAMINOPHEN 500 MG
1000 TABLET ORAL EVERY 6 HOURS PRN
Qty: 30 TABLET | Refills: 11 | Status: SHIPPED
Start: 2023-04-10 | End: 2023-04-11 | Stop reason: SDUPTHER

## 2023-04-10 RX ORDER — ACETAMINOPHEN 500 MG
1000 TABLET ORAL EVERY 6 HOURS PRN
Qty: 30 TABLET | Refills: 11 | Status: CANCELLED
Start: 2023-04-10

## 2023-04-11 ENCOUNTER — HOSPITAL ENCOUNTER (OUTPATIENT)
Dept: LAB | Facility: MEDICAL CENTER | Age: 88
End: 2023-04-11
Attending: UROLOGY
Payer: MEDICARE

## 2023-04-11 LAB — TESTOST SERPL-MCNC: <12 NG/DL (ref 175–781)

## 2023-04-11 PROCEDURE — 36415 COLL VENOUS BLD VENIPUNCTURE: CPT | Mod: GA

## 2023-04-11 PROCEDURE — 84403 ASSAY OF TOTAL TESTOSTERONE: CPT

## 2023-04-11 PROCEDURE — 84153 ASSAY OF PSA TOTAL: CPT | Mod: GA

## 2023-04-11 RX ORDER — ACETAMINOPHEN 500 MG
1000 TABLET ORAL EVERY 6 HOURS PRN
Qty: 30 TABLET | Refills: 11 | Status: SHIPPED
Start: 2023-04-11 | End: 2024-02-26

## 2023-04-11 NOTE — TELEPHONE ENCOUNTER
VOICEMAIL  1. Caller Name: Rosette/Clarence Star                      Call Back Number: [phone]    2. Message: states rx for acetaminophen can't say take 1 or 2 tabs daily.  Per medicare rules is has to be specific.    3. Patient approves office to leave a detailed voicemail/MyChart message: N\A

## 2023-04-12 LAB — PSA SERPL-MCNC: 0.21 NG/ML (ref 0–4)

## 2023-04-27 ENCOUNTER — TELEPHONE (OUTPATIENT)
Dept: HEALTH INFORMATION MANAGEMENT | Facility: OTHER | Age: 88
End: 2023-04-27
Payer: MEDICARE

## 2023-05-16 ENCOUNTER — TELEPHONE (OUTPATIENT)
Dept: HEALTH INFORMATION MANAGEMENT | Facility: OTHER | Age: 88
End: 2023-05-16
Payer: MEDICARE

## 2023-06-07 ENCOUNTER — TELEPHONE (OUTPATIENT)
Dept: NEUROLOGY | Facility: MEDICAL CENTER | Age: 88
End: 2023-06-07
Payer: MEDICARE

## 2023-06-07 NOTE — TELEPHONE ENCOUNTER
[phone]  Gwen (daughter) called asking if they should follow up with dr Hearn. Pt was Dx with late onset Alzheimers and was upset with Dx.  Pt is no longer driving\.  Does not want to come in to appt.  Is it appropriate to have annual visits with dr Hearn? Or only PRN.    Called back, let her know visits are on as needed basis per dr Hearn last notes.   Agreed and cancelled the upcomming appt.

## 2023-06-12 ENCOUNTER — APPOINTMENT (RX ONLY)
Dept: URBAN - METROPOLITAN AREA CLINIC 22 | Facility: CLINIC | Age: 88
Setting detail: DERMATOLOGY
End: 2023-06-12

## 2023-06-12 DIAGNOSIS — L82.0 INFLAMED SEBORRHEIC KERATOSIS: ICD-10-CM

## 2023-06-12 DIAGNOSIS — D18.0 HEMANGIOMA: ICD-10-CM

## 2023-06-12 DIAGNOSIS — D22 MELANOCYTIC NEVI: ICD-10-CM

## 2023-06-12 DIAGNOSIS — L82.1 OTHER SEBORRHEIC KERATOSIS: ICD-10-CM

## 2023-06-12 DIAGNOSIS — L57.0 ACTINIC KERATOSIS: ICD-10-CM

## 2023-06-12 DIAGNOSIS — L81.4 OTHER MELANIN HYPERPIGMENTATION: ICD-10-CM

## 2023-06-12 DIAGNOSIS — Z71.89 OTHER SPECIFIED COUNSELING: ICD-10-CM

## 2023-06-12 DIAGNOSIS — Z85.828 PERSONAL HISTORY OF OTHER MALIGNANT NEOPLASM OF SKIN: ICD-10-CM

## 2023-06-12 PROBLEM — D22.5 MELANOCYTIC NEVI OF TRUNK: Status: ACTIVE | Noted: 2023-06-12

## 2023-06-12 PROBLEM — D18.01 HEMANGIOMA OF SKIN AND SUBCUTANEOUS TISSUE: Status: ACTIVE | Noted: 2023-06-12

## 2023-06-12 PROBLEM — D48.5 NEOPLASM OF UNCERTAIN BEHAVIOR OF SKIN: Status: ACTIVE | Noted: 2023-06-12

## 2023-06-12 PROCEDURE — ? COUNSELING

## 2023-06-12 PROCEDURE — 99213 OFFICE O/P EST LOW 20 MIN: CPT | Mod: 25

## 2023-06-12 PROCEDURE — 11102 TANGNTL BX SKIN SINGLE LES: CPT | Mod: 59

## 2023-06-12 PROCEDURE — 17004 DESTROY PREMAL LESIONS 15/>: CPT

## 2023-06-12 PROCEDURE — ? BIOPSY BY SHAVE METHOD

## 2023-06-12 PROCEDURE — ? ADDITIONAL NOTES

## 2023-06-12 PROCEDURE — ? SUNSCREEN RECOMMENDATIONS

## 2023-06-12 PROCEDURE — ? LIQUID NITROGEN

## 2023-06-12 ASSESSMENT — LOCATION DETAILED DESCRIPTION DERM
LOCATION DETAILED: MEDIAL FRONTAL SCALP
LOCATION DETAILED: LEFT PROXIMAL ULNAR DORSAL FOREARM
LOCATION DETAILED: RIGHT NASAL DORSUM
LOCATION DETAILED: LEFT SUPERIOR MEDIAL UPPER BACK
LOCATION DETAILED: LEFT SUPERIOR HELIX
LOCATION DETAILED: RIGHT NASAL ALA
LOCATION DETAILED: RIGHT SUPERIOR FOREHEAD
LOCATION DETAILED: RIGHT RADIAL DORSAL HAND
LOCATION DETAILED: LEFT PROXIMAL RADIAL DORSAL FOREARM
LOCATION DETAILED: LEFT DISTAL DORSAL FOREARM
LOCATION DETAILED: RIGHT PROXIMAL ULNAR THUMB
LOCATION DETAILED: RIGHT CENTRAL MALAR CHEEK
LOCATION DETAILED: RIGHT SUPERIOR LATERAL NECK
LOCATION DETAILED: RIGHT SUPERIOR MEDIAL MIDBACK
LOCATION DETAILED: LEFT LATERAL FOREHEAD
LOCATION DETAILED: LEFT SUPERIOR FOREHEAD
LOCATION DETAILED: RIGHT CENTRAL FRONTAL SCALP
LOCATION DETAILED: RIGHT INFERIOR HELIX
LOCATION DETAILED: EPIGASTRIC SKIN
LOCATION DETAILED: RIGHT PROXIMAL RADIAL DORSAL FOREARM
LOCATION DETAILED: RIGHT ULNAR DORSAL HAND
LOCATION DETAILED: LEFT RADIAL DORSAL HAND
LOCATION DETAILED: LEFT INCISURA INTERTRAGICA
LOCATION DETAILED: LEFT DORSAL MIDDLE METACARPOPHALANGEAL JOINT
LOCATION DETAILED: LEFT FOREHEAD
LOCATION DETAILED: RIGHT SUPERIOR HELIX

## 2023-06-12 ASSESSMENT — LOCATION ZONE DERM
LOCATION ZONE: FINGER
LOCATION ZONE: FACE
LOCATION ZONE: HAND
LOCATION ZONE: EAR
LOCATION ZONE: TRUNK
LOCATION ZONE: ARM
LOCATION ZONE: SCALP
LOCATION ZONE: NOSE
LOCATION ZONE: NECK

## 2023-06-12 ASSESSMENT — LOCATION SIMPLE DESCRIPTION DERM
LOCATION SIMPLE: LEFT UPPER BACK
LOCATION SIMPLE: RIGHT HAND
LOCATION SIMPLE: RIGHT FOREARM
LOCATION SIMPLE: RIGHT FOREHEAD
LOCATION SIMPLE: RIGHT NOSE
LOCATION SIMPLE: NOSE
LOCATION SIMPLE: ABDOMEN
LOCATION SIMPLE: RIGHT CHEEK
LOCATION SIMPLE: LEFT EAR
LOCATION SIMPLE: RIGHT LOWER BACK
LOCATION SIMPLE: RIGHT THUMB
LOCATION SIMPLE: LEFT HAND
LOCATION SIMPLE: LEFT FOREARM
LOCATION SIMPLE: LEFT FOREHEAD
LOCATION SIMPLE: NECK
LOCATION SIMPLE: FRONTAL SCALP
LOCATION SIMPLE: RIGHT SCALP
LOCATION SIMPLE: RIGHT EAR

## 2023-06-12 NOTE — PROCEDURE: ADDITIONAL NOTES
Render Risk Assessment In Note?: no
Detail Level: Simple
Additional Notes: One lesion treated with LN2.

## 2023-06-12 NOTE — PROCEDURE: COUNSELING
Detail Level: Generalized
Detail Level: Simple
Detail Level: Zone
Detail Level: Detailed
[employer] Pregnancy And Lactation Text: This medication is Pregnancy Category X and is absolutely contraindicated during pregnancy. It is unknown if it is excreted in breast milk.

## 2023-07-26 ENCOUNTER — APPOINTMENT (RX ONLY)
Dept: URBAN - METROPOLITAN AREA CLINIC 22 | Facility: CLINIC | Age: 88
Setting detail: DERMATOLOGY
End: 2023-07-26

## 2023-07-26 PROBLEM — C44.622 SQUAMOUS CELL CARCINOMA OF SKIN OF RIGHT UPPER LIMB, INCLUDING SHOULDER: Status: ACTIVE | Noted: 2023-07-26

## 2023-07-26 PROCEDURE — ? MOHS SURGERY

## 2023-07-26 PROCEDURE — ? DIAGNOSIS COMMENT

## 2023-07-26 PROCEDURE — 13121 CMPLX RPR S/A/L 2.6-7.5 CM: CPT

## 2023-07-26 PROCEDURE — 17313 MOHS 1 STAGE T/A/L: CPT

## 2023-07-26 NOTE — PROCEDURE: MOHS SURGERY
Mohs Case Number: [id number]
Previous Accession (Optional): [id number]
Biopsy Photograph Reviewed: Yes
Referring Physician (Optional): [place]
Consent Type: Consent 1 (Standard)
Eye Shield Used: No
Surgeon Performing Repair (Optional): Jose Rafael Baker M.D.
Initial Size Of Lesion: 0.5
X Size Of Lesion In Cm (Optional): 0.4
Number Of Stages: 1
Primary Defect Length In Cm (Final Defect Size - Required For Flaps/Grafts): 1.3
Primary Defect Width In Cm (Final Defect Size - Required For Flaps/Grafts): 0.8
Repair Type: Complex Repair
Oculoplastic Surgeon Procedure Text (A): After obtaining clear surgical margins the patient was sent to oculoplastics for surgical repair.  The patient understands they will receive post-surgical care and follow-up from the referring physician's office.
Otolaryngologist Procedure Text (A): After obtaining clear surgical margins the patient was sent to otolaryngology for surgical repair.  The patient understands they will receive post-surgical care and follow-up from the referring physician's office.
Plastic Surgeon Procedure Text (A): After obtaining clear surgical margins the patient was sent to plastics for surgical repair.  The patient understands they will receive post-surgical care and follow-up from the referring physician's office.
Mid-Level Procedure Text (A): After obtaining clear surgical margins the patient was sent to a mid-level provider for surgical repair.  The patient understands they will receive post-surgical care and follow-up from the mid-level provider.
Provider Procedure Text (A): After obtaining clear surgical margins the defect was repaired by another provider.
Asc Procedure Text (A): After obtaining clear surgical margins the patient was sent to an ASC for surgical repair.  The patient understands they will receive post-surgical care and follow-up from the ASC physician.
Simple / Intermediate / Complex Repair - Final Wound Length In Cm: 4.6
Suturegard Retention Suture: 2-0 Nylon
Retention Suture Bite Size: 3 mm
Length To Time In Minutes Device Was In Place: 10
Distance Of Undermining In Cm (Required): 1.4
Undermining Type: Entire Wound
Debridement Text: The wound edges were debrided prior to proceeding with the closure to facilitate wound healing.
Helical Rim Text: The closure involved the helical rim.
Vermilion Border Text: The closure involved the vermilion border.
Nostril Rim Text: The closure involved the nostril rim.
Retention Suture Text: Retention sutures were placed to support the closure and prevent dehiscence.
Secondary Defect Length In Cm (Required For Flaps): 0
Location Indication Override (Is Already Calculated Based On Selected Body Location): Area M
Area H Indication Text: Tumors in this location are included in Area H (eyelids, eyebrows, nose, lips, chin, ear, pre-auricular, post-auricular, temple, genitalia, hands, feet, ankles and areola).  Tissue conservation is critical in these anatomic locations.
Area M Indication Text: Tumors in this location are included in Area M (cheek, forehead, scalp, neck, jawline and pretibial skin).  Mohs surgery is indicated for tumors in these anatomic locations.
Area L Indication Text: Tumors in this location are included in Area L (trunk and extremities).  Mohs surgery is indicated for larger tumors, or tumors with aggressive histologic features, in these anatomic locations.
Tumor Debulked?: curette
Depth Of Tumor Invasion (For Histology): tumor not visualized (deep and peripheral margins are clear of tumor)
Perineural Invasion (For Histology - Be Specific If Possible): absent
Presence Of Scar Tissue (For Histology): present
Special Stains Stage 1 - Results: Base On Clearance Noted Above
Stage 2: Additional Anesthesia Type: 1% lidocaine with epinephrine and a 1:10 solution of 8.4% sodium bicarbonate
Stage 4: Additional Anesthesia Type: 1% lidocaine with epinephrine
Include Tumor Staging In Mohs Note?: Please Select the Appropriate Response
Staging Info: By selecting yes to the question above you will include information on AJCC 8 tumor staging in your Mohs note. Information on tumor staging will be automatically added for SCCs on the head and neck. AJCC 8 includes tumor size, tumor depth, perineural involvement and bone invasion.
Tumor Depth: Less than 6mm from granular layer and no invasion beyond the subcutaneous fat
Medical Necessity Statement: Based on my medical judgement, Mohs surgery is the most appropriate treatment for this cancer compared to other treatments.
Alternatives Discussed Intro (Do Not Add Period): I discussed alternative treatments to Mohs surgery and specifically discussed the risks and benefits of
Consent 1/Introductory Paragraph: The rationale for Mohs was explained to the patient and consent was obtained. The risks, benefits and alternatives to therapy were discussed in detail. Specifically, the risks of infection, scarring, bleeding, prolonged wound healing, incomplete removal, allergy to anesthesia, nerve injury and recurrence were addressed. Prior to the procedure, the treatment site was clearly identified and confirmed by the patient. All components of Universal Protocol/PAUSE Rule completed.
Consent 2/Introductory Paragraph: Mohs surgery was explained to the patient and consent was obtained. The risks, benefits and alternatives to therapy were discussed in detail. Specifically, the risks of infection, scarring, bleeding, prolonged wound healing, incomplete removal, allergy to anesthesia, nerve injury and recurrence were addressed. Prior to the procedure, the treatment site was clearly identified and confirmed by the patient. All components of Universal Protocol/PAUSE Rule completed.
Consent 3/Introductory Paragraph: I gave the patient a chance to ask questions they had about the procedure.  Following this I explained the Mohs procedure and consent was obtained. The risks, benefits and alternatives to therapy were discussed in detail. Specifically, the risks of infection, scarring, bleeding, prolonged wound healing, incomplete removal, allergy to anesthesia, nerve injury and recurrence were addressed. Prior to the procedure, the treatment site was clearly identified and confirmed by the patient. All components of Universal Protocol/PAUSE Rule completed.
Consent (Temporal Branch)/Introductory Paragraph: The rationale for Mohs was explained to the patient and consent was obtained. The risks, benefits and alternatives to therapy were discussed in detail. Specifically, the risks of damage to the temporal branch of the facial nerve, infection, scarring, bleeding, prolonged wound healing, incomplete removal, allergy to anesthesia, and recurrence were addressed. Prior to the procedure, the treatment site was clearly identified and confirmed by the patient. All components of Universal Protocol/PAUSE Rule completed.
Consent (Marginal Mandibular)/Introductory Paragraph: The rationale for Mohs was explained to the patient and consent was obtained. The risks, benefits and alternatives to therapy were discussed in detail. Specifically, the risks of damage to the marginal mandibular branch of the facial nerve, infection, scarring, bleeding, prolonged wound healing, incomplete removal, allergy to anesthesia, and recurrence were addressed. Prior to the procedure, the treatment site was clearly identified and confirmed by the patient. All components of Universal Protocol/PAUSE Rule completed.
Consent (Spinal Accessory)/Introductory Paragraph: The rationale for Mohs was explained to the patient and consent was obtained. The risks, benefits and alternatives to therapy were discussed in detail. Specifically, the risks of damage to the spinal accessory nerve, infection, scarring, bleeding, prolonged wound healing, incomplete removal, allergy to anesthesia, and recurrence were addressed. Prior to the procedure, the treatment site was clearly identified and confirmed by the patient. All components of Universal Protocol/PAUSE Rule completed.
Consent (Near Eyelid Margin)/Introductory Paragraph: The rationale for Mohs was explained to the patient and consent was obtained. The risks, benefits and alternatives to therapy were discussed in detail. Specifically, the risks of ectropion or eyelid deformity, infection, scarring, bleeding, prolonged wound healing, incomplete removal, allergy to anesthesia, nerve injury and recurrence were addressed. Prior to the procedure, the treatment site was clearly identified and confirmed by the patient. All components of Universal Protocol/PAUSE Rule completed.
Consent (Ear)/Introductory Paragraph: The rationale for Mohs was explained to the patient and consent was obtained. The risks, benefits and alternatives to therapy were discussed in detail. Specifically, the risks of ear deformity, infection, scarring, bleeding, prolonged wound healing, incomplete removal, allergy to anesthesia, nerve injury and recurrence were addressed. Prior to the procedure, the treatment site was clearly identified and confirmed by the patient. All components of Universal Protocol/PAUSE Rule completed.
Consent (Nose)/Introductory Paragraph: The rationale for Mohs was explained to the patient and consent was obtained. The risks, benefits and alternatives to therapy were discussed in detail. Specifically, the risks of nasal deformity, changes in the flow of air through the nose, infection, scarring, bleeding, prolonged wound healing, incomplete removal, allergy to anesthesia, nerve injury and recurrence were addressed. Prior to the procedure, the treatment site was clearly identified and confirmed by the patient. All components of Universal Protocol/PAUSE Rule completed.
Consent (Lip)/Introductory Paragraph: The rationale for Mohs was explained to the patient and consent was obtained. The risks, benefits and alternatives to therapy were discussed in detail. Specifically, the risks of lip deformity, changes in the oral aperture, infection, scarring, bleeding, prolonged wound healing, incomplete removal, allergy to anesthesia, nerve injury and recurrence were addressed. Prior to the procedure, the treatment site was clearly identified and confirmed by the patient. All components of Universal Protocol/PAUSE Rule completed.
Consent (Scalp)/Introductory Paragraph: The rationale for Mohs was explained to the patient and consent was obtained. The risks, benefits and alternatives to therapy were discussed in detail. Specifically, the risks of changes in hair growth pattern secondary to repair, infection, scarring, bleeding, prolonged wound healing, incomplete removal, allergy to anesthesia, nerve injury and recurrence were addressed. Prior to the procedure, the treatment site was clearly identified and confirmed by the patient. All components of Universal Protocol/PAUSE Rule completed.
Detail Level: Detailed
Postop Diagnosis: same
Anesthesia Volume In Cc: 3
Additional Anesthesia Volume In Cc: 6
Hemostasis: Electrodesiccation
Estimated Blood Loss (Cc): minimal
Repair Anesthesia Method: local infiltration
Undermining Location (Optional): in the fascial plane
Brow Lift Text: A midfrontal incision was made medially to the defect to allow access to the tissues just superior to the left eyebrow. Following careful dissection inferiorly in a supraperiosteal plane to the level of the left eyebrow, several 3-0 monocryl sutures were used to resuspend the eyebrow orbicularis oculi muscular unit to the superior frontal bone periosteum. This resulted in an appropriate reapproximation of static eyebrow symmetry and correction of the left brow ptosis.
Deep Sutures: 4-0 Maxon
Epidermal Sutures: 4-0 Surgipro
Additional Epidermal Sutures: mastisol and steri-strips
Epidermal Closure: running
Suturegard Intro: Intraoperative tissue expansion was performed, utilizing the SUTUREGARD device, in order to reduce wound tension.
Suturegard Body: The suture ends were repeatedly re-tightened and re-clamped to achieve the desired tissue expansion.
Hemigard Intro: Due to skin fragility and wound tension, it was decided to use HEMIGARD adhesive retention suture devices to permit a linear closure. The skin was cleaned and dried for a 6cm distance away from the wound. Excessive hair, if present, was removed to allow for adhesion.
Hemigard Postcare Instructions: The HEMIGARD strips are to remain completely dry for at least 5-7 days.
Donor Site Anesthesia Type: same as repair anesthesia
Epidermal Closure Graft Donor Site (Optional): simple interrupted
Graft Donor Site Bandage (Optional-Leave Blank If You Don't Want In Note): Steri-strips and a pressure bandage were applied to the donor site.
Closure 2 Information: This tab is for additional flaps and grafts, including complex repair and grafts and complex repair and flaps. You can also specify a different location for the additional defect, if the location is the same you do not need to select a new one. We will insert the automated text for the repair you select below just as we do for solitary flaps and grafts. Please note that at this time if you select a location with a different insurance zone you will need to override the ICD10 and CPT if appropriate.
Closure 3 Information: This tab is for additional flaps and grafts above and beyond our usual structured repairs.  Please note if you enter information here it will not currently bill and you will need to add the billing information manually.
Wound Care: Petrolatum
Dressing: pressure dressing with telfa
Dressing (No Sutures): dry sterile dressing
Suture Removal: 14 days
Unna Boot Text: An Unna boot was placed to help immobilize the limb and facilitate more rapid healing.
Home Suture Removal Text: Patient was provided instructions on removing sutures and will remove their sutures at home.  If they have any questions or difficulties they will call the office.
Post-Care Instructions: I reviewed with the patient in detail post-care instructions. Patient is not to engage in any heavy lifting, exercise, or swimming for the next 14 days. Should the patient develop any fevers, chills, bleeding, severe pain patient will contact the office immediately.
Pain Refusal Text: I offered to prescribe pain medication but the patient refused to take this medication.
Mauc Instructions: By selecting yes to the question below the MAUC number will be added into the note.  This will be calculated automatically based on the diagnosis chosen, the size entered, the body zone selected (H,M,L) and the specific indications you chose. You will also have the option to override the Mohs AUC if you disagree with the automatically calculated number and this option is found in the Case Summary tab.
Where Do You Want The Question To Include Opioid Counseling Located?: Case Summary Tab
Eye Protection Verbiage: Before proceeding with the stage, a plastic scleral shield was inserted. The globe was anesthetized with a few drops of 1% lidocaine with 1:100,000 epinephrine. Then, an appropriate sized scleral shield was chosen and coated with lacrilube ointment. The shield was gently inserted and left in place for the duration of each stage. After the stage was completed, the shield was gently removed.
Mohs Method Verbiage: An incision at a 45 degree angle following the standard Mohs approach was done and the specimen was harvested as a microscopic controlled layer.
Surgeon/Pathologist Verbiage (Will Incorporate Name Of Surgeon From Intro If Not Blank): operated in two distinct and integrated capacities as the surgeon and pathologist.
Mohs Histo Method Verbiage: Each section was then chromacoded and processed in the Mohs lab using the Mohs protocol and submitted for frozen section.
Subsequent Stages Histo Method Verbiage: Using a similar technique to that described above, a thin layer of tissue was removed from all areas where tumor was visible on the previous stage.  The tissue was again oriented, mapped, dyed, and processed as above.
Mohs Rapid Report Verbiage: The area of clinically evident tumor was marked with skin marking ink and appropriately hatched.  The initial incision was made following the Mohs approach through the skin.  The specimen was taken to the lab, divided into the necessary number of pieces, chromacoded and processed according to the Mohs protocol.  This was repeated in successive stages until a tumor free defect was achieved.
Complex Repair Preamble Text (Leave Blank If You Do Not Want): Extensive wide undermining was performed.
Intermediate Repair Preamble Text (Leave Blank If You Do Not Want): Undermining was performed with blunt dissection.
Non-Graft Cartilage Fenestration Text: The cartilage was fenestrated with a 2mm punch biopsy to help facilitate healing.
Graft Cartilage Fenestration Text: The cartilage was fenestrated with a 2mm punch biopsy to help facilitate graft survival and healing.
Secondary Intention Text (Leave Blank If You Do Not Want): The defect will heal with secondary intention.
No Repair - Repaired With Adjacent Surgical Defect Text (Leave Blank If You Do Not Want): After obtaining clear surgical margins the defect was repaired concurrently with another surgical defect which was in close approximation.
Adjacent Tissue Transfer Text: The defect edges were debeveled with a #15 scalpel blade. Given the location of the defect and the proximity to free margins an adjacent tissue transfer was deemed most appropriate. Using a sterile surgical marker, an appropriate flap was drawn incorporating the defect and placing the expected incisions within the relaxed skin tension lines where possible. The area thus outlined was incised deep to adipose tissue with a #15 scalpel blade. The skin margins were undermined to an appropriate distance in all directions utilizing iris scissors and carried over to close the primary defect.
Advancement Flap (Single) Text: The defect edges were debeveled with a #15 scalpel blade.  Given the location of the defect and the proximity to free margins a single advancement flap was deemed most appropriate.  Using a sterile surgical marker, an appropriate advancement flap was drawn incorporating the defect and placing the expected incisions within the relaxed skin tension lines where possible.    The area thus outlined was incised deep to adipose tissue with a #15 scalpel blade.  The skin margins were undermined to an appropriate distance in all directions utilizing iris scissors.
Advancement Flap (Double) Text: The defect edges were debeveled with a #15 scalpel blade.  Given the location of the defect and the proximity to free margins a double advancement flap was deemed most appropriate.  Using a sterile surgical marker, the appropriate advancement flaps were drawn incorporating the defect and placing the expected incisions within the relaxed skin tension lines where possible.    The area thus outlined was incised deep to adipose tissue with a #15 scalpel blade.  The skin margins were undermined to an appropriate distance in all directions utilizing iris scissors.
Burow's Advancement Flap Text: The defect edges were debeveled with a #15 scalpel blade.  Given the location of the defect and the proximity to free margins a Burow's advancement flap was deemed most appropriate.  Using a sterile surgical marker, the appropriate advancement flap was drawn incorporating the defect and placing the expected incisions within the relaxed skin tension lines where possible.    The area thus outlined was incised deep to adipose tissue with a #15 scalpel blade.  The skin margins were undermined to an appropriate distance in all directions utilizing iris scissors.
Chonodrocutaneous Helical Advancement Flap Text: The defect edges were debeveled with a #15 scalpel blade. Given the location of the defect and the proximity to free margins a chondrocutaneous helical advancement flap was deemed most appropriate. Using a sterile surgical marker, the appropriate advancement flap was drawn incorporating the defect and placing the expected incisions within the relaxed skin tension lines where possible. The area thus outlined was incised deep to adipose tissue with a #15 scalpel blade. The skin margins were undermined to an appropriate distance in all directions utilizing iris scissors. Following this, the designed flap was advanced and carried over into the primary defect and sutured into place.
Crescentic Advancement Flap Text: The defect edges were debeveled with a #15 scalpel blade.  Given the location of the defect and the proximity to free margins a crescentic advancement flap was deemed most appropriate.  Using a sterile surgical marker, the appropriate advancement flap was drawn incorporating the defect and placing the expected incisions within the relaxed skin tension lines where possible.    The area thus outlined was incised deep to adipose tissue with a #15 scalpel blade.  The skin margins were undermined to an appropriate distance in all directions utilizing iris scissors.
A-T Advancement Flap Text: The defect edges were debeveled with a #15 scalpel blade.  Given the location of the defect, shape of the defect and the proximity to free margins an A-T advancement flap was deemed most appropriate.  Using a sterile surgical marker, an appropriate advancement flap was drawn incorporating the defect and placing the expected incisions within the relaxed skin tension lines where possible.    The area thus outlined was incised deep to adipose tissue with a #15 scalpel blade.  The skin margins were undermined to an appropriate distance in all directions utilizing iris scissors.
O-T Advancement Flap Text: The defect edges were debeveled with a #15 scalpel blade.  Given the location of the defect, shape of the defect and the proximity to free margins an O-T advancement flap was deemed most appropriate.  Using a sterile surgical marker, an appropriate advancement flap was drawn incorporating the defect and placing the expected incisions within the relaxed skin tension lines where possible.    The area thus outlined was incised deep to adipose tissue with a #15 scalpel blade.  The skin margins were undermined to an appropriate distance in all directions utilizing iris scissors.
O-L Flap Text: The defect edges were debeveled with a #15 scalpel blade.  Given the location of the defect, shape of the defect and the proximity to free margins an O-L flap was deemed most appropriate.  Using a sterile surgical marker, an appropriate advancement flap was drawn incorporating the defect and placing the expected incisions within the relaxed skin tension lines where possible.    The area thus outlined was incised deep to adipose tissue with a #15 scalpel blade.  The skin margins were undermined to an appropriate distance in all directions utilizing iris scissors.
O-Z Flap Text: The defect edges were debeveled with a #15 scalpel blade. Given the location of the defect, shape of the defect and the proximity to free margins an O-Z flap was deemed most appropriate. Using a sterile surgical marker, an appropriate transposition flap was drawn incorporating the defect and placing the expected incisions within the relaxed skin tension lines where possible. The area thus outlined was incised deep to adipose tissue with a #15 scalpel blade. The skin margins were undermined to an appropriate distance in all directions utilizing iris scissors. Following this, the designed flap was carried over into the primary defect and sutured into place.
Double O-Z Flap Text: The defect edges were debeveled with a #15 scalpel blade. Given the location of the defect, shape of the defect and the proximity to free margins a Double O-Z flap was deemed most appropriate. Using a sterile surgical marker, an appropriate transposition flap was drawn incorporating the defect and placing the expected incisions within the relaxed skin tension lines where possible. The area thus outlined was incised deep to adipose tissue with a #15 scalpel blade. The skin margins were undermined to an appropriate distance in all directions utilizing iris scissors. Following this, the designed flap was carried over into the primary defect and sutured into place.
V-Y Flap Text: The defect edges were debeveled with a #15 scalpel blade.  Given the location of the defect, shape of the defect and the proximity to free margins a V-Y flap was deemed most appropriate.  Using a sterile surgical marker, an appropriate advancement flap was drawn incorporating the defect and placing the expected incisions within the relaxed skin tension lines where possible.    The area thus outlined was incised deep to adipose tissue with a #15 scalpel blade.  The skin margins were undermined to an appropriate distance in all directions utilizing iris scissors.
Advancement-Rotation Flap Text: The defect edges were debeveled with a #15 scalpel blade.  Given the location of the defect, shape of the defect and the proximity to free margins an advancement-rotation flap was deemed most appropriate.  Using a sterile surgical marker, an appropriate flap was drawn incorporating the defect and placing the expected incisions within the relaxed skin tension lines where possible. The area thus outlined was incised deep to adipose tissue with a #15 scalpel blade.  The skin margins were undermined to an appropriate distance in all directions utilizing iris scissors.
Mercedes Flap Text: The defect edges were debeveled with a #15 scalpel blade.  Given the location of the defect, shape of the defect and the proximity to free margins a Mercedes flap was deemed most appropriate.  Using a sterile surgical marker, an appropriate advancement flap was drawn incorporating the defect and placing the expected incisions within the relaxed skin tension lines where possible. The area thus outlined was incised deep to adipose tissue with a #15 scalpel blade.  The skin margins were undermined to an appropriate distance in all directions utilizing iris scissors.
Modified Advancement Flap Text: The defect edges were debeveled with a #15 scalpel blade.  Given the location of the defect, shape of the defect and the proximity to free margins a modified advancement flap was deemed most appropriate.  Using a sterile surgical marker, an appropriate advancement flap was drawn incorporating the defect and placing the expected incisions within the relaxed skin tension lines where possible.    The area thus outlined was incised deep to adipose tissue with a #15 scalpel blade.  The skin margins were undermined to an appropriate distance in all directions utilizing iris scissors.
Mucosal Advancement Flap Text: Given the location of the defect, shape of the defect and the proximity to free margins a mucosal advancement flap was deemed most appropriate. Incisions were made with a 15 blade scalpel in the appropriate fashion along the cutaneous vermilion border and the mucosal lip. The remaining actinically damaged mucosal tissue was excised.  The mucosal advancement flap was then elevated to the gingival sulcus with care taken to preserve the neurovascular structures and advanced into the primary defect. Care was taken to ensure that precise realignment of the vermilion border was achieved.
Peng Advancement Flap Text: The defect edges were debeveled with a #15 scalpel blade. Given the location of the defect, shape of the defect and the proximity to free margins a Peng advancement flap was deemed most appropriate. Using a sterile surgical marker, an appropriate advancement flap was drawn incorporating the defect and placing the expected incisions within the relaxed skin tension lines where possible. The area thus outlined was incised deep to adipose tissue with a #15 scalpel blade. The skin margins were undermined to an appropriate distance in all directions utilizing iris scissors. Following this, the designed flap was advanced and carried over into the primary defect and sutured into place.
Hatchet Flap Text: The defect edges were debeveled with a #15 scalpel blade.  Given the location of the defect, shape of the defect and the proximity to free margins a hatchet flap was deemed most appropriate.  Using a sterile surgical marker, an appropriate hatchet flap was drawn incorporating the defect and placing the expected incisions within the relaxed skin tension lines where possible.    The area thus outlined was incised deep to adipose tissue with a #15 scalpel blade.  The skin margins were undermined to an appropriate distance in all directions utilizing iris scissors.
Rotation Flap Text: The defect edges were debeveled with a #15 scalpel blade.  Given the location of the defect, shape of the defect and the proximity to free margins a rotation flap was deemed most appropriate.  Using a sterile surgical marker, an appropriate rotation flap was drawn incorporating the defect and placing the expected incisions within the relaxed skin tension lines where possible.    The area thus outlined was incised deep to adipose tissue with a #15 scalpel blade.  The skin margins were undermined to an appropriate distance in all directions utilizing iris scissors.
Bilateral Rotation Flap Text: The defect edges were debeveled with a #15 scalpel blade. Given the location of the defect, shape of the defect and the proximity to free margins a bilateral rotation flap was deemed most appropriate. Using a sterile surgical marker, an appropriate rotation flap was drawn incorporating the defect and placing the expected incisions within the relaxed skin tension lines where possible. The area thus outlined was incised deep to adipose tissue with a #15 scalpel blade. The skin margins were undermined to an appropriate distance in all directions utilizing iris scissors. Following this, the designed flap was carried over into the primary defect and sutured into place.
Spiral Flap Text: The defect edges were debeveled with a #15 scalpel blade.  Given the location of the defect, shape of the defect and the proximity to free margins a spiral flap was deemed most appropriate.  Using a sterile surgical marker, an appropriate rotation flap was drawn incorporating the defect and placing the expected incisions within the relaxed skin tension lines where possible. The area thus outlined was incised deep to adipose tissue with a #15 scalpel blade.  The skin margins were undermined to an appropriate distance in all directions utilizing iris scissors.
Staged Advancement Flap Text: The defect edges were debeveled with a #15 scalpel blade. Given the location of the defect, shape of the defect and the proximity to free margins a staged advancement flap was deemed most appropriate. Using a sterile surgical marker, an appropriate advancement flap was drawn incorporating the defect and placing the expected incisions within the relaxed skin tension lines where possible. The area thus outlined was incised deep to adipose tissue with a #15 scalpel blade. The skin margins were undermined to an appropriate distance in all directions utilizing iris scissors. Following this, the designed flap was carried over into the primary defect and sutured into place.
Star Wedge Flap Text: The defect edges were debeveled with a #15 scalpel blade.  Given the location of the defect, shape of the defect and the proximity to free margins a star wedge flap was deemed most appropriate.  Using a sterile surgical marker, an appropriate rotation flap was drawn incorporating the defect and placing the expected incisions within the relaxed skin tension lines where possible. The area thus outlined was incised deep to adipose tissue with a #15 scalpel blade.  The skin margins were undermined to an appropriate distance in all directions utilizing iris scissors.
Transposition Flap Text: The defect edges were debeveled with a #15 scalpel blade.  Given the location of the defect and the proximity to free margins a transposition flap was deemed most appropriate.  Using a sterile surgical marker, an appropriate transposition flap was drawn incorporating the defect.    The area thus outlined was incised deep to adipose tissue with a #15 scalpel blade.  The skin margins were undermined to an appropriate distance in all directions utilizing iris scissors.
Muscle Hinge Flap Text: The defect edges were debeveled with a #15 scalpel blade.  Given the size, depth and location of the defect and the proximity to free margins a muscle hinge flap was deemed most appropriate.  Using a sterile surgical marker, an appropriate hinge flap was drawn incorporating the defect. The area thus outlined was incised with a #15 scalpel blade.  The skin margins were undermined to an appropriate distance in all directions utilizing iris scissors.
Mustarde Flap Text: The defect edges were debeveled with a #15 scalpel blade.  Given the size, depth and location of the defect and the proximity to free margins a Mustarde flap was deemed most appropriate. Using a sterile surgical marker, an appropriate flap was drawn incorporating the defect. The area thus outlined was incised with a #15 scalpel blade. The skin margins were undermined to an appropriate distance in all directions utilizing iris scissors. Following this, the designed flap was carried into the primary defect and sutured into place.
Nasal Turnover Hinge Flap Text: The defect edges were debeveled with a #15 scalpel blade.  Given the size, depth, location of the defect and the defect being full thickness a nasal turnover hinge flap was deemed most appropriate. Using a sterile surgical marker, an appropriate hinge flap was drawn incorporating the defect. The area thus outlined was incised with a #15 scalpel blade. The flap was designed to recreate the nasal mucosal lining and the alar rim. The skin margins were undermined to an appropriate distance in all directions utilizing iris scissors. Following this, the designed flap was carried over into the primary defect and sutured into place
Nasalis-Muscle-Based Myocutaneous Island Pedicle Flap Text: Using a #15 blade, an incision was made around the donor flap to the level of the nasalis muscle. Wide lateral undermining was then performed in both the subcutaneous plane above the nasalis muscle, and in a submuscular plane just above periosteum. This allowed the formation of a free nasalis muscle axial pedicle (based on the angular artery) which was still attached to the actual cutaneous flap, increasing its mobility and vascular viability. Hemostasis was obtained with pinpoint electrocoagulation. The flap was mobilized into position and the pivotal anchor points positioned and stabilized with buried interrupted sutures. Subcutaneous and dermal tissues were closed in a multilayered fashion with sutures. Tissue redundancies were excised, and the epidermal edges were apposed without significant tension and sutured with sutures.
Orbicularis Oris Muscle Flap Text: The defect edges were debeveled with a #15 scalpel blade.  Given that the defect affected the competency of the oral sphincter an orbicularis oris muscle flap was deemed most appropriate to restore this competency and normal muscle function.  Using a sterile surgical marker, an appropriate flap was drawn incorporating the defect. The area thus outlined was incised with a #15 scalpel blade. Following this, the designed flap was carried over into the primary defect and sutured into place.
Melolabial Transposition Flap Text: The defect edges were debeveled with a #15 scalpel blade.  Given the location of the defect and the proximity to free margins a melolabial flap was deemed most appropriate.  Using a sterile surgical marker, an appropriate melolabial transposition flap was drawn incorporating the defect.    The area thus outlined was incised deep to adipose tissue with a #15 scalpel blade.  The skin margins were undermined to an appropriate distance in all directions utilizing iris scissors.
Rhombic Flap Text: The defect edges were debeveled with a #15 scalpel blade.  Given the location of the defect and the proximity to free margins a rhombic flap was deemed most appropriate.  Using a sterile surgical marker, an appropriate rhombic flap was drawn incorporating the defect.    The area thus outlined was incised deep to adipose tissue with a #15 scalpel blade.  The skin margins were undermined to an appropriate distance in all directions utilizing iris scissors.
Rhomboid Transposition Flap Text: The defect edges were debeveled with a #15 scalpel blade. Given the location of the defect and the proximity to free margins a rhomboid transposition flap was deemed most appropriate. Using a sterile surgical marker, an appropriate rhomboid flap was drawn incorporating the defect. The area thus outlined was incised deep to adipose tissue with a #15 scalpel blade. The skin margins were undermined to an appropriate distance in all directions utilizing iris scissors. Following this, the designed flap was carried over into the primary defect and sutured into place.
Bi-Rhombic Flap Text: The defect edges were debeveled with a #15 scalpel blade.  Given the location of the defect and the proximity to free margins a bi-rhombic flap was deemed most appropriate.  Using a sterile surgical marker, an appropriate rhombic flap was drawn incorporating the defect. The area thus outlined was incised deep to adipose tissue with a #15 scalpel blade.  The skin margins were undermined to an appropriate distance in all directions utilizing iris scissors.
Helical Rim Advancement Flap Text: The defect edges were debeveled with a #15 blade scalpel.  Given the location of the defect and the proximity to free margins (helical rim) a double helical rim advancement flap was deemed most appropriate.  Using a sterile surgical marker, the appropriate advancement flaps were drawn incorporating the defect and placing the expected incisions between the helical rim and antihelix where possible.  The area thus outlined was incised through and through with a #15 scalpel blade.  With a skin hook and iris scissors, the flaps were gently and sharply undermined and freed up.
Bilateral Helical Rim Advancement Flap Text: The defect edges were debeveled with a #15 blade scalpel.  Given the location of the defect and the proximity to free margins (helical rim) a bilateral helical rim advancement flap was deemed most appropriate.  Using a sterile surgical marker, the appropriate advancement flaps were drawn incorporating the defect and placing the expected incisions between the helical rim and antihelix where possible.  The area thus outlined was incised through and through with a #15 scalpel blade.  With a skin hook and iris scissors, the flaps were gently and sharply undermined and freed up.
Ear Star Wedge Flap Text: The defect edges were debeveled with a #15 blade scalpel.  Given the location of the defect and the proximity to free margins (helical rim) an ear star wedge flap was deemed most appropriate.  Using a sterile surgical marker, the appropriate flap was drawn incorporating the defect and placing the expected incisions between the helical rim and antihelix where possible.  The area thus outlined was incised through and through with a #15 scalpel blade.
Banner Transposition Flap Text: The defect edges were debeveled with a #15 scalpel blade.  Given the location of the defect and the proximity to free margins a Banner transposition flap was deemed most appropriate.  Using a sterile surgical marker, an appropriate flap drawn around the defect. The area thus outlined was incised deep to adipose tissue with a #15 scalpel blade.  The skin margins were undermined to an appropriate distance in all directions utilizing iris scissors.
Bilobed Flap Text: The defect edges were debeveled with a #15 scalpel blade.  Given the location of the defect and the proximity to free margins a bilobe flap was deemed most appropriate.  Using a sterile surgical marker, an appropriate bilobe flap drawn around the defect.    The area thus outlined was incised deep to adipose tissue with a #15 scalpel blade.  The skin margins were undermined to an appropriate distance in all directions utilizing iris scissors.
Bilobed Transposition Flap Text: The defect edges were debeveled with a #15 scalpel blade.  Given the location of the defect and the proximity to free margins a bilobed transposition flap was deemed most appropriate.  Using a sterile surgical marker, an appropriate bilobe flap drawn around the defect.    The area thus outlined was incised deep to adipose tissue with a #15 scalpel blade.  The skin margins were undermined to an appropriate distance in all directions utilizing iris scissors.
Trilobed Flap Text: The defect edges were debeveled with a #15 scalpel blade.  Given the location of the defect and the proximity to free margins a trilobed flap was deemed most appropriate.  Using a sterile surgical marker, an appropriate trilobed flap drawn around the defect.    The area thus outlined was incised deep to adipose tissue with a #15 scalpel blade.  The skin margins were undermined to an appropriate distance in all directions utilizing iris scissors.
Dorsal Nasal Flap Text: The defect edges were debeveled with a #15 scalpel blade.  Given the location of the defect and the proximity to free margins a dorsal nasal flap was deemed most appropriate.  Using a sterile surgical marker, an appropriate dorsal nasal flap was drawn around the defect.    The area thus outlined was incised deep to adipose tissue with a #15 scalpel blade.  The skin margins were undermined to an appropriate distance in all directions utilizing iris scissors.
Island Pedicle Flap Text: The defect edges were debeveled with a #15 scalpel blade.  Given the location of the defect, shape of the defect and the proximity to free margins an island pedicle advancement flap was deemed most appropriate.  Using a sterile surgical marker, an appropriate advancement flap was drawn incorporating the defect, outlining the appropriate donor tissue and placing the expected incisions within the relaxed skin tension lines where possible.    The area thus outlined was incised deep to adipose tissue with a #15 scalpel blade.  The skin margins were undermined to an appropriate distance in all directions around the primary defect and laterally outward around the island pedicle utilizing iris scissors.  There was minimal undermining beneath the pedicle flap.
Island Pedicle Flap With Canthal Suspension Text: The defect edges were debeveled with a #15 scalpel blade.  Given the location of the defect, shape of the defect and the proximity to free margins an island pedicle advancement flap was deemed most appropriate.  Using a sterile surgical marker, an appropriate advancement flap was drawn incorporating the defect, outlining the appropriate donor tissue and placing the expected incisions within the relaxed skin tension lines where possible. The area thus outlined was incised deep to adipose tissue with a #15 scalpel blade.  The skin margins were undermined to an appropriate distance in all directions around the primary defect and laterally outward around the island pedicle utilizing iris scissors.  There was minimal undermining beneath the pedicle flap. A suspension suture was placed in the canthal tendon to prevent tension and prevent ectropion.
Alar Island Pedicle Flap Text: The defect edges were debeveled with a #15 scalpel blade.  Given the location of the defect, shape of the defect and the proximity to the alar rim an island pedicle advancement flap was deemed most appropriate.  Using a sterile surgical marker, an appropriate advancement flap was drawn incorporating the defect, outlining the appropriate donor tissue and placing the expected incisions within the nasal ala running parallel to the alar rim. The area thus outlined was incised with a #15 scalpel blade.  The skin margins were undermined minimally to an appropriate distance in all directions around the primary defect and laterally outward around the island pedicle utilizing iris scissors.  There was minimal undermining beneath the pedicle flap.
Double Island Pedicle Flap Text: The defect edges were debeveled with a #15 scalpel blade.  Given the location of the defect, shape of the defect and the proximity to free margins a double island pedicle advancement flap was deemed most appropriate.  Using a sterile surgical marker, an appropriate advancement flap was drawn incorporating the defect, outlining the appropriate donor tissue and placing the expected incisions within the relaxed skin tension lines where possible.    The area thus outlined was incised deep to adipose tissue with a #15 scalpel blade.  The skin margins were undermined to an appropriate distance in all directions around the primary defect and laterally outward around the island pedicle utilizing iris scissors.  There was minimal undermining beneath the pedicle flap.
Island Pedicle Flap-Requiring Vessel Identification Text: The defect edges were debeveled with a #15 scalpel blade.  Given the location of the defect, shape of the defect and the proximity to free margins an island pedicle advancement flap was deemed most appropriate.  Using a sterile surgical marker, an appropriate advancement flap was drawn, based on the axial vessel mentioned above, incorporating the defect, outlining the appropriate donor tissue and placing the expected incisions within the relaxed skin tension lines where possible.    The area thus outlined was incised deep to adipose tissue with a #15 scalpel blade.  The skin margins were undermined to an appropriate distance in all directions around the primary defect and laterally outward around the island pedicle utilizing iris scissors.  There was minimal undermining beneath the pedicle flap.
Keystone Flap Text: The defect edges were debeveled with a #15 scalpel blade.  Given the location of the defect, shape of the defect a keystone flap was deemed most appropriate.  Using a sterile surgical marker, an appropriate keystone flap was drawn incorporating the defect, outlining the appropriate donor tissue and placing the expected incisions within the relaxed skin tension lines where possible. The area thus outlined was incised deep to adipose tissue with a #15 scalpel blade.  The skin margins were undermined to an appropriate distance in all directions around the primary defect and laterally outward around the flap utilizing iris scissors.
O-T Plasty Text: The defect edges were debeveled with a #15 scalpel blade.  Given the location of the defect, shape of the defect and the proximity to free margins an O-T plasty was deemed most appropriate.  Using a sterile surgical marker, an appropriate O-T plasty was drawn incorporating the defect and placing the expected incisions within the relaxed skin tension lines where possible.    The area thus outlined was incised deep to adipose tissue with a #15 scalpel blade.  The skin margins were undermined to an appropriate distance in all directions utilizing iris scissors.
O-Z Plasty Text: The defect edges were debeveled with a #15 scalpel blade.  Given the location of the defect, shape of the defect and the proximity to free margins an O-Z plasty (double transposition flap) was deemed most appropriate.  Using a sterile surgical marker, the appropriate transposition flaps were drawn incorporating the defect and placing the expected incisions within the relaxed skin tension lines where possible.    The area thus outlined was incised deep to adipose tissue with a #15 scalpel blade.  The skin margins were undermined to an appropriate distance in all directions utilizing iris scissors.  Hemostasis was achieved with electrocautery.  The flaps were then transposed into place, one clockwise and the other counterclockwise, and anchored with interrupted buried subcutaneous sutures.
Double O-Z Plasty Text: The defect edges were debeveled with a #15 scalpel blade. Given the location of the defect, shape of the defect and the proximity to free margins a Double O-Z plasty (double transposition flap) was deemed most appropriate. Using a sterile surgical marker, the appropriate transposition flaps were drawn incorporating the defect and placing the expected incisions within the relaxed skin tension lines where possible. The area thus outlined was incised deep to adipose tissue with a #15 scalpel blade. The skin margins were undermined to an appropriate distance in all directions utilizing iris scissors. Hemostasis was achieved with electrocautery. The flaps were then transposed and carried over into place, one clockwise and the other counterclockwise, and anchored with interrupted buried subcutaneous sutures.
V-Y Plasty Text: The defect edges were debeveled with a #15 scalpel blade.  Given the location of the defect, shape of the defect and the proximity to free margins an V-Y advancement flap was deemed most appropriate.  Using a sterile surgical marker, an appropriate advancement flap was drawn incorporating the defect and placing the expected incisions within the relaxed skin tension lines where possible.    The area thus outlined was incised deep to adipose tissue with a #15 scalpel blade.  The skin margins were undermined to an appropriate distance in all directions utilizing iris scissors.
H Plasty Text: Given the location of the defect, shape of the defect and the proximity to free margins a H-plasty was deemed most appropriate for repair.  Using a sterile surgical marker, the appropriate advancement arms of the H-plasty were drawn incorporating the defect and placing the expected incisions within the relaxed skin tension lines where possible. The area thus outlined was incised deep to adipose tissue with a #15 scalpel blade. The skin margins were undermined to an appropriate distance in all directions utilizing iris scissors.  The opposing advancement arms were then advanced into place in opposite direction and anchored with interrupted buried subcutaneous sutures.
W Plasty Text: The lesion was extirpated to the level of the fat with a #15 scalpel blade.  Given the location of the defect, shape of the defect and the proximity to free margins a W-plasty was deemed most appropriate for repair.  Using a sterile surgical marker, the appropriate transposition arms of the W-plasty were drawn incorporating the defect and placing the expected incisions within the relaxed skin tension lines where possible.    The area thus outlined was incised deep to adipose tissue with a #15 scalpel blade.  The skin margins were undermined to an appropriate distance in all directions utilizing iris scissors.  The opposing transposition arms were then transposed into place in opposite direction and anchored with interrupted buried subcutaneous sutures.
Z Plasty Text: The lesion was extirpated to the level of the fat with a #15 scalpel blade.  Given the location of the defect, shape of the defect and the proximity to free margins a Z-plasty was deemed most appropriate for repair.  Using a sterile surgical marker, the appropriate transposition arms of the Z-plasty were drawn incorporating the defect and placing the expected incisions within the relaxed skin tension lines where possible.    The area thus outlined was incised deep to adipose tissue with a #15 scalpel blade.  The skin margins were undermined to an appropriate distance in all directions utilizing iris scissors.  The opposing transposition arms were then transposed into place in opposite direction and anchored with interrupted buried subcutaneous sutures.
Double Z Plasty Text: The lesion was extirpated to the level of the fat with a #15 scalpel blade. Given the location of the defect, shape of the defect and the proximity to free margins a double Z-plasty was deemed most appropriate for repair. Using a sterile surgical marker, the appropriate transposition arms of the double Z-plasty were drawn incorporating the defect and placing the expected incisions within the relaxed skin tension lines where possible. The area thus outlined was incised deep to adipose tissue with a #15 scalpel blade. The skin margins were undermined to an appropriate distance in all directions utilizing iris scissors. The opposing transposition arms were then transposed and carried over into place in opposite direction and anchored with interrupted buried subcutaneous sutures.
Zygomaticofacial Flap Text: Given the location of the defect, shape of the defect and the proximity to free margins a zygomaticofacial flap was deemed most appropriate for repair. Using a sterile surgical marker, the appropriate flap was drawn incorporating the defect and placing the expected incisions within the relaxed skin tension lines where possible. The area thus outlined was incised deep to adipose tissue with a #15 scalpel blade with preservation of a vascular pedicle.  The skin margins were undermined to an appropriate distance in all directions utilizing iris scissors. The flap was then carried over into the defect and anchored with interrupted buried subcutaneous sutures.
Cheek Interpolation Flap Text: A decision was made to reconstruct the defect utilizing an interpolation axial flap and a staged reconstruction.  A telfa template was made of the defect.  This telfa template was then used to outline the Cheek Interpolation flap.  The donor area for the pedicle flap was then injected with anesthesia.  The flap was excised through the skin and subcutaneous tissue down to the layer of the underlying musculature.  The interpolation flap was carefully excised within this deep plane to maintain its blood supply.  The edges of the donor site were undermined.   The donor site was closed in a primary fashion.  The pedicle was then rotated into position and sutured.  Once the tube was sutured into place, adequate blood supply was confirmed with blanching and refill.  The pedicle was then wrapped with xeroform gauze and dressed appropriately with a telfa and gauze bandage to ensure continued blood supply and protect the attached pedicle.
Cheek-To-Nose Interpolation Flap Text: A decision was made to reconstruct the defect utilizing an interpolation axial flap and a staged reconstruction.  A telfa template was made of the defect.  This telfa template was then used to outline the Cheek-To-Nose Interpolation flap.  The donor area for the pedicle flap was then injected with anesthesia.  The flap was excised through the skin and subcutaneous tissue down to the layer of the underlying musculature.  The interpolation flap was carefully excised within this deep plane to maintain its blood supply.  The edges of the donor site were undermined.   The donor site was closed in a primary fashion.  The pedicle was then rotated into position and sutured.  Once the tube was sutured into place, adequate blood supply was confirmed with blanching and refill.  The pedicle was then wrapped with xeroform gauze and dressed appropriately with a telfa and gauze bandage to ensure continued blood supply and protect the attached pedicle.
Interpolation Flap Text: A decision was made to reconstruct the defect utilizing an interpolation axial flap and a staged reconstruction.  A telfa template was made of the defect.  This telfa template was then used to outline the interpolation flap.  The donor area for the pedicle flap was then injected with anesthesia.  The flap was excised through the skin and subcutaneous tissue down to the layer of the underlying musculature.  The interpolation flap was carefully excised within this deep plane to maintain its blood supply.  The edges of the donor site were undermined.   The donor site was closed in a primary fashion.  The pedicle was then rotated into position and sutured.  Once the tube was sutured into place, adequate blood supply was confirmed with blanching and refill.  The pedicle was then wrapped with xeroform gauze and dressed appropriately with a telfa and gauze bandage to ensure continued blood supply and protect the attached pedicle.
Melolabial Interpolation Flap Text: A decision was made to reconstruct the defect utilizing an interpolation axial flap and a staged reconstruction.  A telfa template was made of the defect.  This telfa template was then used to outline the melolabial interpolation flap.  The donor area for the pedicle flap was then injected with anesthesia.  The flap was excised through the skin and subcutaneous tissue down to the layer of the underlying musculature.  The pedicle flap was carefully excised within this deep plane to maintain its blood supply.  The edges of the donor site were undermined.   The donor site was closed in a primary fashion.  The pedicle was then rotated into position and sutured.  Once the tube was sutured into place, adequate blood supply was confirmed with blanching and refill.  The pedicle was then wrapped with xeroform gauze and dressed appropriately with a telfa and gauze bandage to ensure continued blood supply and protect the attached pedicle.
Mastoid Interpolation Flap Text: A decision was made to reconstruct the defect utilizing an interpolation axial flap and a staged reconstruction.  A telfa template was made of the defect.  This telfa template was then used to outline the mastoid interpolation flap.  The donor area for the pedicle flap was then injected with anesthesia.  The flap was excised through the skin and subcutaneous tissue down to the layer of the underlying musculature.  The pedicle flap was carefully excised within this deep plane to maintain its blood supply.  The edges of the donor site were undermined.   The donor site was closed in a primary fashion.  The pedicle was then rotated into position and sutured.  Once the tube was sutured into place, adequate blood supply was confirmed with blanching and refill.  The pedicle was then wrapped with xeroform gauze and dressed appropriately with a telfa and gauze bandage to ensure continued blood supply and protect the attached pedicle.
Posterior Auricular Interpolation Flap Text: A decision was made to reconstruct the defect utilizing an interpolation axial flap and a staged reconstruction.  A telfa template was made of the defect.  This telfa template was then used to outline the posterior auricular interpolation flap.  The donor area for the pedicle flap was then injected with anesthesia.  The flap was excised through the skin and subcutaneous tissue down to the layer of the underlying musculature.  The pedicle flap was carefully excised within this deep plane to maintain its blood supply.  The edges of the donor site were undermined.   The donor site was closed in a primary fashion.  The pedicle was then rotated into position and sutured.  Once the tube was sutured into place, adequate blood supply was confirmed with blanching and refill.  The pedicle was then wrapped with xeroform gauze and dressed appropriately with a telfa and gauze bandage to ensure continued blood supply and protect the attached pedicle.
Paramedian Forehead Flap Text: A decision was made to reconstruct the defect utilizing an interpolation axial flap and a staged reconstruction.  A telfa template was made of the defect.  This telfa template was then used to outline the paramedian forehead pedicle flap.  The donor area for the pedicle flap was then injected with anesthesia.  The flap was excised through the skin and subcutaneous tissue down to the layer of the underlying musculature.  The pedicle flap was carefully excised within this deep plane to maintain its blood supply.  The edges of the donor site were undermined.   The donor site was closed in a primary fashion.  The pedicle was then rotated into position and sutured.  Once the tube was sutured into place, adequate blood supply was confirmed with blanching and refill.  The pedicle was then wrapped with xeroform gauze and dressed appropriately with a telfa and gauze bandage to ensure continued blood supply and protect the attached pedicle.
Abbe Flap (Upper To Lower Lip) Text: The defect of the lower lip was assessed and measured.  Given the location and size of the defect, an Abbe flap was deemed most appropriate. Using a sterile surgical marker, an appropriate Abbe flap was measured and drawn on the upper lip. Local anesthesia was then infiltrated.  A scalpel was then used to incise the upper lip through and through the skin, vermilion, muscle and mucosa, leaving the flap pedicled on the opposite side.  The flap was then rotated and transferred to the lower lip defect.  The flap was then sutured into place with a three layer technique, closing the orbicularis oris muscle layer with subcutaneous buried sutures, followed by a mucosal layer and an epidermal layer.
Abbe Flap (Lower To Upper Lip) Text: The defect of the upper lip was assessed and measured.  Given the location and size of the defect, an Abbe flap was deemed most appropriate. Using a sterile surgical marker, an appropriate Abbe flap was measured and drawn on the lower lip. Local anesthesia was then infiltrated. A scalpel was then used to incise the upper lip through and through the skin, vermilion, muscle and mucosa, leaving the flap pedicled on the opposite side.  The flap was then rotated and transferred to the lower lip defect.  The flap was then sutured into place with a three layer technique, closing the orbicularis oris muscle layer with subcutaneous buried sutures, followed by a mucosal layer and an epidermal layer.
Estlander Flap (Upper To Lower Lip) Text: The defect of the lower lip was assessed and measured.  Given the location and size of the defect, an Estlander flap was deemed most appropriate. Using a sterile surgical marker, an appropriate Estlander flap was measured and drawn on the upper lip. Local anesthesia was then infiltrated. A scalpel was then used to incise the lateral aspect of the flap, through skin, muscle and mucosa, leaving the flap pedicled medially.  The flap was then rotated and positioned to fill the lower lip defect.  The flap was then sutured into place with a three layer technique, closing the orbicularis oris muscle layer with subcutaneous buried sutures, followed by a mucosal layer and an epidermal layer.
Cheiloplasty (Less Than 50%) Text: A decision was made to reconstruct the defect with a  cheiloplasty.  The defect was undermined extensively.  Additional obicularis oris muscle was excised with a 15 blade scalpel.  The defect was converted into a full thickness wedge, of less than 50% of the vertical height of the lip, to facilite a better cosmetic result.  Small vessels were then tied off with 5-0 monocyrl. The obicularis oris, superficial fascia, adipose and dermis were then reapproximated.  After the deeper layers were approximated the epidermis was reapproximated with particular care given to realign the vermilion border.
Cheiloplasty (Complex) Text: A decision was made to reconstruct the defect with a  cheiloplasty.  The defect was undermined extensively.  Additional obicularis oris muscle was excised with a 15 blade scalpel.  The defect was converted into a full thickness wedge to facilite a better cosmetic result.  Small vessels were then tied off with 5-0 monocyrl. The obicularis oris, superficial fascia, adipose and dermis were then reapproximated.  After the deeper layers were approximated the epidermis was reapproximated with particular care given to realign the vermilion border.
Ear Wedge Repair Text: A wedge excision was completed by carrying down an excision through the full thickness of the ear and cartilage with an inward facing Burow's triangle. The wound was then closed in a layered fashion.
Full Thickness Lip Wedge Repair (Flap) Text: Given the location of the defect and the proximity to free margins a full thickness wedge repair was deemed most appropriate.  Using a sterile surgical marker, the appropriate repair was drawn incorporating the defect and placing the expected incisions perpendicular to the vermilion border.  The vermilion border was also meticulously outlined to ensure appropriate reapproximation during the repair.  The area thus outlined was incised through and through with a #15 scalpel blade.  The muscularis and dermis were reaproximated with deep sutures following hemostasis. Care was taken to realign the vermilion border before proceeding with the superficial closure.  Once the vermilion was realigned the superfical and mucosal closure was finished.
Ftsg Text: The defect edges were debeveled with a #15 scalpel blade.  Given the location of the defect, shape of the defect and the proximity to free margins a full thickness skin graft was deemed most appropriate.  Using a sterile surgical marker, the primary defect shape was transferred to the donor site. The area thus outlined was incised deep to adipose tissue with a #15 scalpel blade.  The harvested graft was then trimmed of adipose tissue until only dermis and epidermis was left.  The skin margins of the secondary defect were undermined to an appropriate distance in all directions utilizing iris scissors.  The secondary defect was closed with interrupted buried subcutaneous sutures.  The skin edges were then re-apposed with running  sutures.  The skin graft was then placed in the primary defect and oriented appropriately.
Split-Thickness Skin Graft Text: The defect edges were debeveled with a #15 scalpel blade.  Given the location of the defect, shape of the defect and the proximity to free margins a split thickness skin graft was deemed most appropriate.  Using a sterile surgical marker, the primary defect shape was transferred to the donor site. The split thickness graft was then harvested.  The skin graft was then placed in the primary defect and oriented appropriately.
Pinch Graft Text: The defect edges were debeveled with a #15 scalpel blade. Given the location of the defect, shape of the defect and the proximity to free margins a pinch graft was deemed most appropriate. Using a sterile surgical marker, the primary defect shape was transferred to the donor site. The area thus outlined was incised deep to adipose tissue with a #15 scalpel blade.  The harvested graft was then trimmed of adipose tissue until only dermis and epidermis was left. The skin margins of the secondary defect were undermined to an appropriate distance in all directions utilizing iris scissors.  The secondary defect was closed with interrupted buried subcutaneous sutures.  The skin edges were then re-apposed with running  sutures.  The skin graft was then placed in the primary defect and oriented appropriately.
Burow's Graft Text: The defect edges were debeveled with a #15 scalpel blade. Given the location of the defect, shape of the defect, the proximity to free margins and the presence of a standing cone deformity a Burow's skin graft was deemed most appropriate. The standing cone was removed and this tissue was then trimmed to the shape of the primary defect. The adipose tissue was also removed until only dermis and epidermis were left.  The skin margins of the secondary defect were undermined to an appropriate distance in all directions utilizing iris scissors.  The secondary defect was closed with interrupted buried subcutaneous sutures.  The skin edges were then re-apposed with running  sutures.  The skin graft was then placed in the primary defect and oriented appropriately.
Cartilage Graft Text: The defect edges were debeveled with a #15 scalpel blade.  Given the location of the defect, shape of the defect, the fact the defect involved a full thickness cartilage defect a cartilage graft was deemed most appropriate.  An appropriate donor site was identified, cleansed, and anesthetized. The cartilage graft was then harvested and transferred to the recipient site, oriented appropriately and then sutured into place.  The secondary defect was then repaired using a primary closure.
Composite Graft Text: The defect edges were debeveled with a #15 scalpel blade.  Given the location of the defect, shape of the defect, the proximity to free margins and the fact the defect was full thickness a composite graft was deemed most appropriate.  The defect was outline and then transferred to the donor site.  A full thickness graft was then excised from the donor site. The graft was then placed in the primary defect, oriented appropriately and then sutured into place.  The secondary defect was then repaired using a primary closure.
Epidermal Autograft Text: The defect edges were debeveled with a #15 scalpel blade.  Given the location of the defect, shape of the defect and the proximity to free margins an epidermal autograft was deemed most appropriate.  Using a sterile surgical marker, the primary defect shape was transferred to the donor site. The epidermal graft was then harvested.  The skin graft was then placed in the primary defect and oriented appropriately.
Dermal Autograft Text: The defect edges were debeveled with a #15 scalpel blade.  Given the location of the defect, shape of the defect and the proximity to free margins a dermal autograft was deemed most appropriate.  Using a sterile surgical marker, the primary defect shape was transferred to the donor site. The area thus outlined was incised deep to adipose tissue with a #15 scalpel blade.  The harvested graft was then trimmed of adipose and epidermal tissue until only dermis was left.  The skin graft was then placed in the primary defect and oriented appropriately.
Skin Substitute Text: The defect edges were debeveled with a #15 scalpel blade.  Given the location of the defect, shape of the defect and the proximity to free margins a skin substitute graft was deemed most appropriate.  The graft material was trimmed to fit the size of the defect. The graft was then placed in the primary defect and oriented appropriately.
Tissue Cultured Epidermal Autograft Text: The defect edges were debeveled with a #15 scalpel blade.  Given the location of the defect, shape of the defect and the proximity to free margins a tissue cultured epidermal autograft was deemed most appropriate.  The graft was then trimmed to fit the size of the defect.  The graft was then placed in the primary defect and oriented appropriately.
Xenograft Text: The defect edges were debeveled with a #15 scalpel blade.  Given the location of the defect, shape of the defect and the proximity to free margins a xenograft was deemed most appropriate.  The graft was then trimmed to fit the size of the defect.  The graft was then placed in the primary defect and oriented appropriately.
Purse String (Simple) Text: Given the location of the defect and the characteristics of the surrounding skin a purse string closure was deemed most appropriate.  Undermining was performed circumfirentially around the surgical defect.  A purse string suture was then placed and tightened.
Purse String (Intermediate) Text: Given the location of the defect and the characteristics of the surrounding skin a purse string intermediate closure was deemed most appropriate.  Undermining was performed circumfirentially around the surgical defect.  A purse string suture was then placed and tightened.
Partial Purse String (Simple) Text: Given the location of the defect and the characteristics of the surrounding skin a simple purse string closure was deemed most appropriate.  Undermining was performed circumfirentially around the surgical defect.  A purse string suture was then placed and tightened. Wound tension only allowed a partial closure of the circular defect.
Partial Purse String (Intermediate) Text: Given the location of the defect and the characteristics of the surrounding skin an intermediate purse string closure was deemed most appropriate.  Undermining was performed circumfirentially around the surgical defect.  A purse string suture was then placed and tightened. Wound tension only allowed a partial closure of the circular defect.
Localized Dermabrasion With Wire Brush Text: The patient was draped in routine manner.  Localized dermabrasion using 3 x 17 mm wire brush was performed in routine manner to papillary dermis. This spot dermabrasion is being performed to complete skin cancer reconstruction. It also will eliminate the other sun damaged precancerous cells that are known to be part of the regional effect of a lifetime's worth of sun exposure. This localized dermabrasion is therapeutic and should not be considered cosmetic in any regard.
Tarsorrhaphy Text: A tarsorrhaphy was performed using Frost sutures.
Intermediate Repair And Flap Additional Text (Will Appearing After The Standard Complex Repair Text): The intermediate repair was not sufficient to completely close the primary defect. The remaining additional defect was repaired with the flap mentioned below.
Intermediate Repair And Graft Additional Text (Will Appearing After The Standard Complex Repair Text): The intermediate repair was not sufficient to completely close the primary defect. The remaining additional defect was repaired with the graft mentioned below.
Complex Repair And Flap Additional Text (Will Appearing After The Standard Complex Repair Text): The complex repair was not sufficient to completely close the primary defect. The remaining additional defect was repaired with the flap mentioned below.
Complex Repair And Graft Additional Text (Will Appearing After The Standard Complex Repair Text): The complex repair was not sufficient to completely close the primary defect. The remaining additional defect was repaired with the graft mentioned below.
Manual Repair Warning Statement: We plan on removing the manually selected variable below in favor of our much easier automatic structured text blocks found in the previous tab. We decided to do this to help make the flow better and give you the full power of structured data. Manual selection is never going to be ideal in our platform and I would encourage you to avoid using manual selection from this point on, especially since I will be sunsetting this feature. It is important that you do one of two things with the customized text below. First, you can save all of the text in a word file so you can have it for future reference. Second, transfer the text to the appropriate area in the Library tab. Lastly, if there is a flap or graft type which we do not have you need to let us know right away so I can add it in before the variable is hidden. No need to panic, we plan to give you roughly 6 months to make the change.
Same Histology In Subsequent Stages Text: The pattern and morphology of the tumor is as described in the first stage.
No Residual Tumor Seen Histology Text: There were no malignant cells seen in the sections examined.
Inflammation Suggestive Of Cancer Camouflage Histology Text: There was a dense lymphocytic infiltrate which prevented adequate histologic evaluation of adjacent structures.
Bcc Histology Text: There were numerous aggregates of basaloid cells.
Bcc Infiltrative Histology Text: There were numerous aggregates of basaloid cells demonstrating an infiltrative pattern.
Mart-1 - Positive Histology Text: MART-1 staining demonstrates areas of higher density and clustering of melanocytes with Pagetoid spread upwards within the epidermis. The surgical margins are positive for tumor cells.
Mart-1 - Negative Histology Text: MART-1 staining demonstrates a normal density and pattern of melanocytes along the dermal-epidermal junction. The surgical margins are negative for tumor cells.
Information: Selecting Yes will display possible errors in your note based on the variables you have selected. This validation is only offered as a suggestion for you. PLEASE NOTE THAT THE VALIDATION TEXT WILL BE REMOVED WHEN YOU FINALIZE YOUR NOTE. IF YOU WANT TO FAX A PRELIMINARY NOTE YOU WILL NEED TO TOGGLE THIS TO 'NO' IF YOU DO NOT WANT IT IN YOUR FAXED NOTE.

## 2023-08-09 ENCOUNTER — APPOINTMENT (RX ONLY)
Dept: URBAN - METROPOLITAN AREA CLINIC 22 | Facility: CLINIC | Age: 88
Setting detail: DERMATOLOGY
End: 2023-08-09

## 2023-08-09 DIAGNOSIS — L57.0 ACTINIC KERATOSIS: ICD-10-CM

## 2023-08-09 DIAGNOSIS — Z48.817 ENCOUNTER FOR SURGICAL AFTERCARE FOLLOWING SURGERY ON THE SKIN AND SUBCUTANEOUS TISSUE: ICD-10-CM

## 2023-08-09 PROCEDURE — 17004 DESTROY PREMAL LESIONS 15/>: CPT | Mod: 79

## 2023-08-09 PROCEDURE — ? POST-OP WOUND EVALUATION

## 2023-08-09 PROCEDURE — ? LIQUID NITROGEN

## 2023-08-09 PROCEDURE — 99024 POSTOP FOLLOW-UP VISIT: CPT

## 2023-08-09 ASSESSMENT — LOCATION DETAILED DESCRIPTION DERM
LOCATION DETAILED: LEFT CENTRAL PARIETAL SCALP
LOCATION DETAILED: RIGHT SUPERIOR POSTAURICULAR SKIN
LOCATION DETAILED: RIGHT CENTRAL POSTAURICULAR SKIN
LOCATION DETAILED: LEFT CENTRAL FRONTAL SCALP
LOCATION DETAILED: LEFT INFERIOR PARIETAL SCALP
LOCATION DETAILED: LEFT SUPERIOR OCCIPITAL SCALP
LOCATION DETAILED: RIGHT DORSAL WRIST
LOCATION DETAILED: RIGHT CENTRAL FRONTAL SCALP
LOCATION DETAILED: RIGHT SUPERIOR PARIETAL SCALP
LOCATION DETAILED: RIGHT SUPERIOR OCCIPITAL SCALP
LOCATION DETAILED: LEFT SUPERIOR POSTAURICULAR SKIN

## 2023-08-09 ASSESSMENT — LOCATION SIMPLE DESCRIPTION DERM
LOCATION SIMPLE: RIGHT WRIST
LOCATION SIMPLE: POSTERIOR SCALP
LOCATION SIMPLE: RIGHT OCCIPITAL SCALP
LOCATION SIMPLE: SCALP
LOCATION SIMPLE: RIGHT SCALP

## 2023-08-09 ASSESSMENT — LOCATION ZONE DERM
LOCATION ZONE: ARM
LOCATION ZONE: SCALP

## 2023-08-09 NOTE — PROCEDURE: POST-OP WOUND EVALUATION
Detail Level: Detailed
Add 73368 Cpt? (Important Note: In 2017 The Use Of 70922 Is Being Tracked By Cms To Determine Future Global Period Reimbursement For Global Periods): yes
Wound Evaluated By (Optional): Jose Rafael Baker MD
Wound Diameter In Cm(Optional): 0
Wound Crusting?: clean
Sutures?: intact
Wound Edema?: mild
Wound Drainage?: serous drainage
Wound Color?: pink

## 2023-08-09 NOTE — PROCEDURE: LIQUID NITROGEN
Duration Of Freeze Thaw-Cycle (Seconds): 5
Consent: The patient's consent was obtained including but not limited to risks of crusting, scabbing, blistering, scarring, darker or lighter pigmentary change, recurrence, incomplete removal and infection.
Show Aperture Variable?: Yes
Detail Level: Detailed
Post-Care Instructions: I reviewed with the patient in detail post-care instructions. Patient is to wear sunprotection, and avoid picking at any of the treated lesions. Pt may apply Vaseline to crusted or scabbing areas.
Application Tool (Optional): Liquid Nitrogen Sprayer
Render Note In Bullet Format When Appropriate: No
Number Of Freeze-Thaw Cycles: 1 freeze-thaw cycle

## 2023-08-21 ENCOUNTER — OFFICE VISIT (OUTPATIENT)
Dept: URGENT CARE | Facility: PHYSICIAN GROUP | Age: 88
End: 2023-08-21
Payer: MEDICARE

## 2023-08-21 VITALS
OXYGEN SATURATION: 94 % | SYSTOLIC BLOOD PRESSURE: 116 MMHG | WEIGHT: 130 LBS | TEMPERATURE: 97.2 F | HEIGHT: 62 IN | HEART RATE: 78 BPM | BODY MASS INDEX: 23.92 KG/M2 | DIASTOLIC BLOOD PRESSURE: 58 MMHG | RESPIRATION RATE: 18 BRPM

## 2023-08-21 DIAGNOSIS — S61.411A SKIN TEAR OF RIGHT HAND WITHOUT COMPLICATION, INITIAL ENCOUNTER: ICD-10-CM

## 2023-08-21 PROCEDURE — 3078F DIAST BP <80 MM HG: CPT | Performed by: PHYSICIAN ASSISTANT

## 2023-08-21 PROCEDURE — 3074F SYST BP LT 130 MM HG: CPT | Performed by: PHYSICIAN ASSISTANT

## 2023-08-21 PROCEDURE — 99213 OFFICE O/P EST LOW 20 MIN: CPT | Performed by: PHYSICIAN ASSISTANT

## 2023-08-21 ASSESSMENT — FIBROSIS 4 INDEX: FIB4 SCORE: 3.87

## 2023-08-21 NOTE — PROGRESS NOTES
Brad Cedillo is a 89 y.o. male who presents with Laceration (Last night pt was trying to get grandchild out of closet cut RT hand on wire in closet)            HPI    This is a new problem.  The patient presents to clinic with his daughter secondary to a skin tear to the back of the right hand.  The patient states he sustained the skin tear to the back of his right hand last night while attempting to get his 8-year-old grandchild out of the closet.  The patient states while attempting to remove his child from the closet he accidentally scraped the back of his left hand on a wire shelf.  The patient sustained a moderate-sized skin tear to the back of his right hand with some continued active bleeding.  The patient notes slight pain/discomfort to the injured area.  He reports no associated swelling.  He also reports no redness, increased warmth, or discharge/drainage.  The patient reports no associated fever.  He also reports no decreased range of motion of the right hand.  The use of anticoagulants.  He has not taken any OTC medications for his current symptoms.    PMH:  has a past medical history of Cancer ([facility]) (2011), Cataract (01/27/2021), Diarrhea, Pacemaker, and Urinary incontinence (01/27/2021).  MEDS:   Current Outpatient Medications:     acetaminophen (TYLENOL) 500 MG Tab, Take 2 Tablets by mouth every 6 hours as needed for Mild Pain. (Patient not taking: Reported on 8/21/2023), Disp: 30 Tablet, Rfl: 11  ALLERGIES: No Known Allergies  SURGHX:   Past Surgical History:   Procedure Laterality Date    CATARACT PHACO WITH IOL Right 2/10/2021    Procedure: PHACOEMULSIFICATION, CATARACT, WITH IOL INSERTION;  Surgeon: Ralph Saldivar M.D.;  Location: SURGERY SAME DAY [facility];  Service: Ophthalmology    OTHER      PACEMAKER INSERTION      TONSILLECTOMY      when he was 4 years old     SOCHX:  reports that he has never smoked. He has never used smokeless tobacco. He reports that he does not drink  "alcohol and does not use drugs.  FH: Family history was reviewed, no pertinent findings to report      Review of Systems   Skin:         + skin tear to back of the right hand   All other systems reviewed and are negative.             Objective     /58 (BP Location: Left arm, Patient Position: Sitting, BP Cuff Size: Adult)   Pulse 78   Temp 36.2 °C (97.2 °F) (Temporal)   Resp 18   Ht 1.575 m (5' 2\")   Wt 59 kg (130 lb)   SpO2 94%   BMI 23.78 kg/m²      Physical Exam  Constitutional:       General: He is not in acute distress.     Appearance: Normal appearance. He is well-developed. He is not ill-appearing.   HENT:      Head: Normocephalic and atraumatic.      Right Ear: External ear normal.      Left Ear: External ear normal.   Eyes:      Extraocular Movements: Extraocular movements intact.      Conjunctiva/sclera: Conjunctivae normal.   Cardiovascular:      Rate and Rhythm: Normal rate.   Pulmonary:      Effort: Pulmonary effort is normal.   Musculoskeletal:         General: Normal range of motion.      Cervical back: Normal range of motion and neck supple.   Skin:     General: Skin is warm and dry.      Comments:   A large skin tear is present to the dorsal aspect of the right hand with associated ecchymosis.  No tenderness palpation.  No edema.  No surrounding erythema.  No increased warmth.  No active bleeding.  No discharge/drainage.  No visible foreign bodies.  No visible underlying structures.  The patient demonstrates full active range of motion of the right hand.  The patient is distally neurovascular intact with full and equal strength.   Neurological:      Mental Status: He is alert and oriented to person, place, and time.                 Progress:  Wound Care:  Cleansed the patient's skin tear with diluted Hibiclens.  Irrigated the skin tear with saline bullets.  Used tissue forceps to unfold the patient's skin tear and reapproximate the wound edges.  Secured the wound edges of the skin flap " with Steri-Strips with good wound approximation.  Educated the patient and his daughter on proper wound care.  Advised the patient and his daughter to monitor for signs of infection.             Assessment & Plan          1. Skin tear of right hand without complication, initial encounter    Differential diagnoses, supportive care, and indications for immediate follow-up discussed with patient.   Instructed to return to clinic or nearest emergency department for any change in condition, further concerns, or worsening of symptoms.    OTC Tylenol or Motrin for fever/discomfort.  Keep wound clean and dry  Apply Polysporin/Neosporin to the wound as needed  Cover wound as needed  Allow wound to be open to the air for at least a portion of the day  Avoid soaking the wound  Monitor for signs of infection  Follow-up with PCP  Return to clinic or go to the ED if symptoms worsen or fail to improve, or if patient should develop worsening/increasing/persistent pain/tenderness to the injured area, swelling, bruising, redness or warmth to the injured area, discharge/drainage from the wound, decreased range of motion, fever/chills, secondary signs of infection, and/or any concerning symptoms.    Discussed plan with patient and his daughter, and they agree to the above.    I personally reviewed prior external notes and test results pertinent to today's visit.  I have independently reviewed and interpreted all diagnostics ordered during this urgent care visit.     Please note that this dictation was created using voice recognition software. I have made every reasonable attempt to correct obvious errors, but I expect that there may be errors of grammar and possibly content that I did not discover before finalizing the note.     This note was electronically signed by Kristan Sorto PA-C

## 2023-09-08 ENCOUNTER — TELEPHONE (OUTPATIENT)
Dept: HEALTH INFORMATION MANAGEMENT | Facility: OTHER | Age: 88
End: 2023-09-08
Payer: MEDICARE

## 2023-10-10 ENCOUNTER — HOSPITAL ENCOUNTER (OUTPATIENT)
Dept: LAB | Facility: MEDICAL CENTER | Age: 88
End: 2023-10-10
Attending: PHYSICIAN ASSISTANT
Payer: MEDICARE

## 2023-10-10 LAB — PSA SERPL-MCNC: 6.61 NG/ML (ref 0–4)

## 2023-10-10 PROCEDURE — 36415 COLL VENOUS BLD VENIPUNCTURE: CPT

## 2023-10-10 PROCEDURE — 84153 ASSAY OF PSA TOTAL: CPT

## 2023-10-19 ENCOUNTER — HOSPITAL ENCOUNTER (OUTPATIENT)
Dept: LAB | Facility: MEDICAL CENTER | Age: 88
End: 2023-10-19
Attending: PHYSICIAN ASSISTANT
Payer: MEDICARE

## 2023-10-19 LAB — TESTOST SERPL-MCNC: 736 NG/DL (ref 175–781)

## 2023-10-19 PROCEDURE — 84403 ASSAY OF TOTAL TESTOSTERONE: CPT

## 2023-10-19 PROCEDURE — 36415 COLL VENOUS BLD VENIPUNCTURE: CPT

## 2023-10-26 ENCOUNTER — HOSPITAL ENCOUNTER (OUTPATIENT)
Dept: RADIOLOGY | Facility: MEDICAL CENTER | Age: 88
End: 2023-10-26
Attending: PHYSICIAN ASSISTANT
Payer: MEDICARE

## 2023-10-26 DIAGNOSIS — C61 MALIGNANT NEOPLASM OF PROSTATE (HCC): ICD-10-CM

## 2023-10-26 PROCEDURE — A9503 TC99M MEDRONATE: HCPCS

## 2023-11-29 ENCOUNTER — PATIENT MESSAGE (OUTPATIENT)
Dept: HEALTH INFORMATION MANAGEMENT | Facility: OTHER | Age: 88
End: 2023-11-29

## 2023-12-12 ENCOUNTER — APPOINTMENT (RX ONLY)
Dept: URBAN - METROPOLITAN AREA CLINIC 22 | Facility: CLINIC | Age: 88
Setting detail: DERMATOLOGY
End: 2023-12-12

## 2023-12-12 DIAGNOSIS — Z71.89 OTHER SPECIFIED COUNSELING: ICD-10-CM

## 2023-12-12 DIAGNOSIS — D18.0 HEMANGIOMA: ICD-10-CM

## 2023-12-12 DIAGNOSIS — Z85.828 PERSONAL HISTORY OF OTHER MALIGNANT NEOPLASM OF SKIN: ICD-10-CM

## 2023-12-12 DIAGNOSIS — L81.4 OTHER MELANIN HYPERPIGMENTATION: ICD-10-CM

## 2023-12-12 DIAGNOSIS — D22 MELANOCYTIC NEVI: ICD-10-CM

## 2023-12-12 DIAGNOSIS — L82.1 OTHER SEBORRHEIC KERATOSIS: ICD-10-CM

## 2023-12-12 DIAGNOSIS — L57.0 ACTINIC KERATOSIS: ICD-10-CM

## 2023-12-12 PROBLEM — D18.01 HEMANGIOMA OF SKIN AND SUBCUTANEOUS TISSUE: Status: ACTIVE | Noted: 2023-12-12

## 2023-12-12 PROBLEM — D22.5 MELANOCYTIC NEVI OF TRUNK: Status: ACTIVE | Noted: 2023-12-12

## 2023-12-12 PROCEDURE — ? COUNSELING

## 2023-12-12 PROCEDURE — 17004 DESTROY PREMAL LESIONS 15/>: CPT

## 2023-12-12 PROCEDURE — 99213 OFFICE O/P EST LOW 20 MIN: CPT | Mod: 25

## 2023-12-12 PROCEDURE — ? SUNSCREEN RECOMMENDATIONS

## 2023-12-12 PROCEDURE — ? LIQUID NITROGEN

## 2023-12-12 ASSESSMENT — LOCATION SIMPLE DESCRIPTION DERM
LOCATION SIMPLE: LEFT FOREARM
LOCATION SIMPLE: RIGHT HAND
LOCATION SIMPLE: NOSE
LOCATION SIMPLE: LEFT UPPER BACK
LOCATION SIMPLE: RIGHT UPPER ARM
LOCATION SIMPLE: RIGHT EAR
LOCATION SIMPLE: LEFT HAND
LOCATION SIMPLE: LEFT EAR
LOCATION SIMPLE: LEFT SCALP
LOCATION SIMPLE: RIGHT FOREHEAD
LOCATION SIMPLE: RIGHT WRIST
LOCATION SIMPLE: LEFT FOREHEAD
LOCATION SIMPLE: LEFT WRIST
LOCATION SIMPLE: ABDOMEN
LOCATION SIMPLE: RIGHT LOWER BACK

## 2023-12-12 ASSESSMENT — LOCATION DETAILED DESCRIPTION DERM
LOCATION DETAILED: LEFT VENTRAL PROXIMAL FOREARM
LOCATION DETAILED: LEFT DISTAL RADIAL DORSAL FOREARM
LOCATION DETAILED: RIGHT DORSAL WRIST
LOCATION DETAILED: EPIGASTRIC SKIN
LOCATION DETAILED: LEFT INFERIOR HELIX
LOCATION DETAILED: RIGHT PROXIMAL POSTERIOR UPPER ARM
LOCATION DETAILED: RIGHT RADIAL DORSAL HAND
LOCATION DETAILED: LEFT CENTRAL FRONTAL SCALP
LOCATION DETAILED: LEFT PROXIMAL DORSAL FOREARM
LOCATION DETAILED: LEFT VENTRAL DISTAL FOREARM
LOCATION DETAILED: LEFT RADIAL DORSAL HAND
LOCATION DETAILED: LEFT DORSAL WRIST
LOCATION DETAILED: LEFT SUPERIOR MEDIAL UPPER BACK
LOCATION DETAILED: LEFT SUPERIOR FOREHEAD
LOCATION DETAILED: RIGHT SUPERIOR FOREHEAD
LOCATION DETAILED: RIGHT SUPERIOR MEDIAL MIDBACK
LOCATION DETAILED: NASAL TIP
LOCATION DETAILED: RIGHT SUPERIOR HELIX

## 2023-12-12 ASSESSMENT — LOCATION ZONE DERM
LOCATION ZONE: EAR
LOCATION ZONE: FACE
LOCATION ZONE: TRUNK
LOCATION ZONE: HAND
LOCATION ZONE: NOSE
LOCATION ZONE: SCALP
LOCATION ZONE: ARM

## 2023-12-12 NOTE — PROCEDURE: LIQUID NITROGEN
Detail Level: Detailed
Number Of Freeze-Thaw Cycles: 2 freeze-thaw cycles
Duration Of Freeze Thaw-Cycle (Seconds): 3
Show Aperture Variable?: Yes
Render Note In Bullet Format When Appropriate: No
Post-Care Instructions: I reviewed with the patient in detail post-care instructions. Patient is to wear sunprotection, and avoid picking at any of the treated lesions. Pt may apply Vaseline to crusted or scabbing areas.
Consent: The patient's consent was obtained including but not limited to risks of crusting, scabbing, blistering, scarring, darker or lighter pigmentary change, recurrence, incomplete removal and infection.
Negative

## 2024-02-26 ENCOUNTER — OFFICE VISIT (OUTPATIENT)
Dept: MEDICAL GROUP | Facility: PHYSICIAN GROUP | Age: 89
End: 2024-02-26
Payer: MEDICARE

## 2024-02-26 VITALS
SYSTOLIC BLOOD PRESSURE: 112 MMHG | OXYGEN SATURATION: 94 % | WEIGHT: 138 LBS | HEIGHT: 62 IN | DIASTOLIC BLOOD PRESSURE: 78 MMHG | HEART RATE: 65 BPM | BODY MASS INDEX: 25.4 KG/M2 | TEMPERATURE: 97.7 F

## 2024-02-26 DIAGNOSIS — Z02.9 ADMINISTRATIVE ENCOUNTER: ICD-10-CM

## 2024-02-26 DIAGNOSIS — E78.5 DYSLIPIDEMIA: ICD-10-CM

## 2024-02-26 DIAGNOSIS — C61 PROSTATE CANCER (HCC): ICD-10-CM

## 2024-02-26 DIAGNOSIS — F02.80 LATE ONSET ALZHEIMER'S DEMENTIA WITHOUT BEHAVIORAL DISTURBANCE, PSYCHOTIC DISTURBANCE, MOOD DISTURBANCE, OR ANXIETY, UNSPECIFIED DEMENTIA SEVERITY (HCC): ICD-10-CM

## 2024-02-26 DIAGNOSIS — G30.1 LATE ONSET ALZHEIMER'S DEMENTIA WITHOUT BEHAVIORAL DISTURBANCE, PSYCHOTIC DISTURBANCE, MOOD DISTURBANCE, OR ANXIETY, UNSPECIFIED DEMENTIA SEVERITY (HCC): ICD-10-CM

## 2024-02-26 DIAGNOSIS — D61.818 PANCYTOPENIA (HCC): ICD-10-CM

## 2024-02-26 PROBLEM — I44.2 HEART BLOCK AV THIRD DEGREE (HCC): Status: RESOLVED | Noted: 2023-02-28 | Resolved: 2024-02-26

## 2024-02-26 PROCEDURE — 99215 OFFICE O/P EST HI 40 MIN: CPT | Performed by: INTERNAL MEDICINE

## 2024-02-26 PROCEDURE — 3074F SYST BP LT 130 MM HG: CPT | Performed by: INTERNAL MEDICINE

## 2024-02-26 PROCEDURE — 3078F DIAST BP <80 MM HG: CPT | Performed by: INTERNAL MEDICINE

## 2024-02-26 ASSESSMENT — FIBROSIS 4 INDEX: FIB4 SCORE: 3.91

## 2024-02-26 ASSESSMENT — PATIENT HEALTH QUESTIONNAIRE - PHQ9: CLINICAL INTERPRETATION OF PHQ2 SCORE: 0

## 2024-02-26 NOTE — ASSESSMENT & PLAN NOTE
Chronic condition.  Noted with chart review.   Latest Reference Range & Units 03/01/23 18:48 03/02/23 01:20 03/03/23 06:14 03/04/23 08:24   WBC 4.8 - 10.8 K/uL 5.1 5.3 5.9 3.7 (L)   RBC 4.70 - 6.10 M/uL 3.81 (L) 3.85 (L) 4.41 (L) 4.24 (L)   Hemoglobin 14.0 - 18.0 g/dL 12.1 (L) 12.4 (L) 14.0 13.4 (L)   Hematocrit 42.0 - 52.0 % 35.8 (L) 36.9 (L) 40.9 (L) 39.5 (L)   MCV 81.4 - 97.8 fL 94.0 95.8 92.7 93.2   MCH 27.0 - 33.0 pg 31.8 32.2 31.7 31.6   MCHC 33.7 - 35.3 g/dL 33.8 33.6 (L) 34.2 33.9   RDW 35.9 - 50.0 fL 47.8 48.1 45.6 46.5   Platelet Count 164 - 446 K/uL 134 (L) 142 (L) 164 148 (L)       Patient asymptomatic presently.

## 2024-02-26 NOTE — ASSESSMENT & PLAN NOTE
This is a chronic condition.  Patient followed by urology service.  Diagnosed in 2012.  The patient is status post radiation treatment.  Patient was treated with Lupron.    Recent lab test showed a rise in PSA level.  Advised the patient to follow-up with urology service     Latest Reference Range & Units 04/11/23 14:15 10/10/23 11:57   Prostatic Specific Antigen Tot 0.00 - 4.00 ng/mL 0.21 6.61 (H)   (H): Data is abnormally high

## 2024-02-26 NOTE — PROGRESS NOTES
PRIMARY CARE CLINIC VISIT        Chief Complaint   Patient presents with    Follow-Up         To complete [employer] disability/certification  Alzheimer dementia  Pancytopenia  Prostate cancer    Pcp  Gely Rose M.D.       History of Present Illness     Late onset Alzheimer dementia (HCC)  This is a chronic condition.  As noted per patient PCP's note.  Patient has been followed by neurology service.  Previous head CT scan completed April 2022 showed progressive parenchymal volume loss greatest in the temporal lobes supporting diagnosis of Alzheimer's disease.    Pancytopenia (HCC)  Chronic condition.  Noted with chart review.   Latest Reference Range & Units 03/01/23 18:48 03/02/23 01:20 03/03/23 06:14 03/04/23 08:24   WBC 4.8 - 10.8 K/uL 5.1 5.3 5.9 3.7 (L)   RBC 4.70 - 6.10 M/uL 3.81 (L) 3.85 (L) 4.41 (L) 4.24 (L)   Hemoglobin 14.0 - 18.0 g/dL 12.1 (L) 12.4 (L) 14.0 13.4 (L)   Hematocrit 42.0 - 52.0 % 35.8 (L) 36.9 (L) 40.9 (L) 39.5 (L)   MCV 81.4 - 97.8 fL 94.0 95.8 92.7 93.2   MCH 27.0 - 33.0 pg 31.8 32.2 31.7 31.6   MCHC 33.7 - 35.3 g/dL 33.8 33.6 (L) 34.2 33.9   RDW 35.9 - 50.0 fL 47.8 48.1 45.6 46.5   Platelet Count 164 - 446 K/uL 134 (L) 142 (L) 164 148 (L)       Patient asymptomatic presently.    Prostate cancer (HCC)  This is a chronic condition.  Patient followed by urology service.  Diagnosed in 2012.  The patient is status post radiation treatment.  Patient was treated with Lupron.    Recent lab test showed a rise in PSA level.  Advised the patient to follow-up with urology service     Latest Reference Range & Units 04/11/23 14:15 10/10/23 11:57   Prostatic Specific Antigen Tot 0.00 - 4.00 ng/mL 0.21 6.61 (H)   (H): Data is abnormally high    Administrative encounter  Patient is accompanied by daughter today.    Daughter is requesting [employer] disability form to be completed.    I have filled out the form to the best of my ability.  Original form given to the patient's daughter today before  "leaving.    Dyslipidemia  This is a chronic condition.  The patient presently on diet therapy.      Allergies: Patient has no known allergies.    No current [facility]-ordered outpatient medications on file.     No current [facility]-ordered facility-administered medications on file.       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 Samuel    Urinary incontinence 01/27/2021       Past Surgical History:   Procedure Laterality Date    CATARACT PHACO WITH IOL Right 2/10/2021    Procedure: PHACOEMULSIFICATION, CATARACT, WITH IOL INSERTION;  Surgeon: Ralph Saldivar M.D.;  Location: SURGERY SAME DAY [facility];  Service: Ophthalmology    OTHER      PACEMAKER INSERTION      TONSILLECTOMY      when he was 4 years old       Family History   Problem Relation Age of Onset    Heart Disease Father        Social History     Tobacco Use   Smoking Status Never   Smokeless Tobacco Never       Social History     Substance and Sexual Activity   Alcohol Use No    Alcohol/week: 0.0 oz       Review of systems.  As per HPI above. All other systems reviewed and negative.      Past Medical, Social, and Family history reviewed and updated in EPIC     Objective     /78 (BP Location: Right arm, Patient Position: Sitting, BP Cuff Size: Adult)   Pulse 65   Temp 36.5 °C (97.7 °F) (Temporal)   Ht 1.575 m (5' 2\")   Wt 62.6 kg (138 lb)   SpO2 94%    Body mass index is 25.24 kg/m².    General: alert in no apparent distress.  Cardiovascular: regular rate and rhythm  Pulmonary: lungs : no wheezing   Gastrointestinal: BS present.         No results found for: \"HBA1C\"    Lab Results   Component Value Date/Time    WBC 3.7 (L) 03/04/2023 08:24 AM    HEMOGLOBIN 13.4 (L) 03/04/2023 08:24 AM    HEMATOCRIT 39.5 (L) 03/04/2023 08:24 AM    MCV 93.2 03/04/2023 08:24 AM    PLATELETCT 148 (L) 03/04/2023 08:24 AM     "     Lab Results   Component Value Date/Time    SODIUM 141 03/04/2023 08:24 AM    POTASSIUM 4.1 03/04/2023 08:24 AM    GLUCOSE 92 03/04/2023 08:24 AM    BUN 21 03/04/2023 08:24 AM    CREATININE 1.03 03/04/2023 08:24 AM    CREATININE 1.1 09/17/2008 04:10 AM       Lab Results   Component Value Date/Time    CHOLSTRLTOT 184 02/16/2023 09:39 AM    TRIGLYCERIDE 79 02/16/2023 09:39 AM    HDL 63 02/16/2023 09:39 AM     (H) 02/16/2023 09:39 AM       Lab Results   Component Value Date/Time    ALTSGPT 8 03/02/2023 01:20 AM             Assessment and Plan     1. Late onset Alzheimer's dementia without behavioral disturbance, psychotic disturbance, mood disturbance, or anxiety, unspecified dementia severity (HCC)  This is a chronic condition.  Stable.  Patient has been seen by neurologist.  Patient presently not on medication.  Recommend to continue follow-up with neurology as directed.      2. Prostate cancer (HCC)  Chronic condition.  Status post radiation treatment.  Patient was noted with rising trend of PSA.  Recommend to follow-up with urologist Dr. Tejeda      3. Pancytopenia (HCC)  This is a chronic condition.  Stable.  The patient denies fever chills unexplained weight loss or night sweats.  Recommend to continue follow-up with PCP      4.  Dyslipidemia.  Chart review showed slightly elevated LDL.  Recommend to continue with low-fat low-cholesterol diet.  Patient to follow-up with PCP to consider repeating the lipid panel.      5. Administrative encounter  Date form disability form of several pages completed today.  Original form given back to the patient's daughter leaving the office.          Attestation: I spent: 41 min -    That include  time complete disability form of several pages  Time for chart review before the visit, the actual patient visit, and time spent on documentation in EMR after the visit.  Chart review/prep, review of other providers' records, imaging/lab review, face-to-face time for  history/examination, pt's counseling/education, ordering, prescribing,  review of results/meds/ treatment plan with patient, and care coordination.                   Please note that this dictation was created using voice recognition software. I have made every reasonable attempt to correct obvious errors, but I expect that there are errors of grammar and possibly content that I did not discover before finalizing the note.    Catarino Harry MD  Internal Medicine  [facility]

## 2024-02-26 NOTE — ASSESSMENT & PLAN NOTE
Patient is accompanied by daughter today.    Daughter is requesting [employer] disability form to be completed.    I have filled out the form to the best of my ability.  Original form given to the patient's daughter today before leaving.

## 2024-02-26 NOTE — ASSESSMENT & PLAN NOTE
This is a chronic condition.  As noted per patient PCP's note.  Patient has been followed by neurology service.  Previous head CT scan completed April 2022 showed progressive parenchymal volume loss greatest in the temporal lobes supporting diagnosis of Alzheimer's disease.

## 2024-05-13 ENCOUNTER — HOSPITAL ENCOUNTER (OUTPATIENT)
Dept: LAB | Facility: MEDICAL CENTER | Age: 89
End: 2024-05-13
Attending: UROLOGY
Payer: MEDICARE

## 2024-05-13 LAB
PSA SERPL-MCNC: 11.8 NG/ML (ref 0–4)
TESTOST SERPL-MCNC: 767 NG/DL (ref 175–781)

## 2024-06-10 ENCOUNTER — HOSPITAL ENCOUNTER (OUTPATIENT)
Dept: RADIOLOGY | Facility: MEDICAL CENTER | Age: 89
End: 2024-06-10
Attending: PHYSICIAN ASSISTANT
Payer: MEDICARE

## 2024-06-10 DIAGNOSIS — C61 MALIGNANT NEOPLASM OF PROSTATE (HCC): ICD-10-CM

## 2024-06-10 PROCEDURE — A9503 TC99M MEDRONATE: HCPCS

## 2024-06-14 NOTE — PROGRESS NOTES
Verbal consent was acquired by the patient to use [employer] ambient listening note generation during this visit Yes     Subjective:     CC:   Chief Complaint   Patient presents with    Loose Stools    Extremity Weakness         HPI:   History of Present Illness  Chico Cedillo  is a 89-year-old young male here for follow-up visit. He is accompanied by his daughter.  The patient continues to reside at [facility].  His daughter reports he has been experiencing diarrhea since the previous Saturday and Monday, a condition previously experienced every 6 to 8 weeks for the past 4 to 5 years.  She states prior to the recent episodes the patient consumed beef when his typical diet consists of scrambled eggs, hashbrowns, and phillips for all meals of the day. The patient denies experiencing any cramping or pain, and the diarrhea has since resolved. The patient's vitals suggest the possibility of dehydration.  He denies experiencing dizziness or lightheadedness upon standing.The patient typically eats two meals a day, rarely three, and drinks less than a full bottle of water daily. The patient is currently undergoing physical therapy three times a week. The patient's caregivers report that the patient often complains of a lack of appetite. The patient's favorite food is chocolate, and he occasionally consumes vanilla ice cream after dinner. The patient does not engage in regular exercise. The patient has made some friends at the facility.  His sons visit him once a month, and his daughter brings him to his appointments. The patient enjoys reading and watching television in his apartment.      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 Samuel    Urinary incontinence 01/27/2021       Social History     Tobacco Use    Smoking status: Never    Smokeless tobacco: Never   Vaping Use  "   Vaping status: Never Used   Substance Use Topics    Alcohol use: No     Alcohol/week: 0.0 oz    Drug use: No       No current Epic-ordered outpatient medications on file.     No current Epic-ordered facility-administered medications on file.       Allergies:  Patient has no known allergies.    Health Maintenance: Completed    Review of Systems:  No fevers or chills. No cough, chest pain, or shortness of breath.       Objective:       Exam:  BP 90/40 (BP Location: Left arm, Patient Position: Sitting, BP Cuff Size: Child)   Pulse 74   Temp 36.8 °C (98.2 °F) (Temporal)   Resp 20   Ht 1.575 m (5' 2\")   Wt 60.5 kg (133 lb 7 oz)   SpO2 95%   BMI 24.41 kg/m²  Body mass index is 24.41 kg/m².    Constitutional: Thin, no acute distress.  Lungs: Clear to auscultation bilaterally. No wheezes, ronchi, or rales.   Cardiovascular: Regular rate and rhythm, no murmurs noted.   Abdomen: Soft, nontender, nondistended  Extremities: Full upper and lower extremity strength      Assessment & Plan:     89 y.o. male with the following -     Weakness generalized  Diarrhea, unspecified type  Chronic condition, recurrent.  The patient's generalized weakness is likely secondary to his poor appetite and oral intake and possibly related to his dementia.  He does appear to be a bit dehydrated in general with worsening of this during episodes of his diarrhea.  Encouraged the patient and his daughter to supplement with protein drinks as possible as well as electrolyte based fluid consumption.        My total time spent caring for the patient on the day of the encounter was 26 minutes. This does not include time spent on separately billable procedures/tests.        Return in about 6 months (around 12/19/2024) for 6-month f/u visit.    Please note that this dictation was created using voice recognition software. I have made every reasonable attempt to correct obvious errors, but I expect that there are errors of grammar and possibly content " that I did not discover before finalizing the note.

## 2024-06-17 ENCOUNTER — APPOINTMENT (RX ONLY)
Dept: URBAN - METROPOLITAN AREA CLINIC 22 | Facility: CLINIC | Age: 89
Setting detail: DERMATOLOGY
End: 2024-06-17

## 2024-06-17 DIAGNOSIS — L82.1 OTHER SEBORRHEIC KERATOSIS: ICD-10-CM

## 2024-06-17 DIAGNOSIS — L57.0 ACTINIC KERATOSIS: ICD-10-CM

## 2024-06-17 DIAGNOSIS — L81.4 OTHER MELANIN HYPERPIGMENTATION: ICD-10-CM

## 2024-06-17 DIAGNOSIS — Z85.828 PERSONAL HISTORY OF OTHER MALIGNANT NEOPLASM OF SKIN: ICD-10-CM

## 2024-06-17 DIAGNOSIS — L72.8 OTHER FOLLICULAR CYSTS OF THE SKIN AND SUBCUTANEOUS TISSUE: ICD-10-CM

## 2024-06-17 DIAGNOSIS — D22 MELANOCYTIC NEVI: ICD-10-CM

## 2024-06-17 DIAGNOSIS — Z71.89 OTHER SPECIFIED COUNSELING: ICD-10-CM

## 2024-06-17 DIAGNOSIS — D18.0 HEMANGIOMA: ICD-10-CM

## 2024-06-17 PROBLEM — D18.01 HEMANGIOMA OF SKIN AND SUBCUTANEOUS TISSUE: Status: ACTIVE | Noted: 2024-06-17

## 2024-06-17 PROBLEM — D22.5 MELANOCYTIC NEVI OF TRUNK: Status: ACTIVE | Noted: 2024-06-17

## 2024-06-17 PROCEDURE — ? LIQUID NITROGEN

## 2024-06-17 PROCEDURE — ? INCISION AND DRAINAGE

## 2024-06-17 PROCEDURE — 10060 I&D ABSCESS SIMPLE/SINGLE: CPT

## 2024-06-17 PROCEDURE — 99213 OFFICE O/P EST LOW 20 MIN: CPT | Mod: 25

## 2024-06-17 PROCEDURE — ? COUNSELING

## 2024-06-17 PROCEDURE — 17004 DESTROY PREMAL LESIONS 15/>: CPT

## 2024-06-17 PROCEDURE — ? SUNSCREEN RECOMMENDATIONS

## 2024-06-17 SDOH — ECONOMIC STABILITY: FOOD INSECURITY: WITHIN THE PAST 12 MONTHS, THE FOOD YOU BOUGHT JUST DIDN'T LAST AND YOU DIDN'T HAVE MONEY TO GET MORE.: NEVER TRUE

## 2024-06-17 SDOH — ECONOMIC STABILITY: FOOD INSECURITY: WITHIN THE PAST 12 MONTHS, YOU WORRIED THAT YOUR FOOD WOULD RUN OUT BEFORE YOU GOT MONEY TO BUY MORE.: NEVER TRUE

## 2024-06-17 SDOH — ECONOMIC STABILITY: INCOME INSECURITY: IN THE LAST 12 MONTHS, WAS THERE A TIME WHEN YOU WERE NOT ABLE TO PAY THE MORTGAGE OR RENT ON TIME?: NO

## 2024-06-17 SDOH — ECONOMIC STABILITY: INCOME INSECURITY: HOW HARD IS IT FOR YOU TO PAY FOR THE VERY BASICS LIKE FOOD, HOUSING, MEDICAL CARE, AND HEATING?: NOT HARD AT ALL

## 2024-06-17 SDOH — ECONOMIC STABILITY: HOUSING INSECURITY: IN THE LAST 12 MONTHS, HOW MANY PLACES HAVE YOU LIVED?: 1

## 2024-06-17 SDOH — HEALTH STABILITY: PHYSICAL HEALTH

## 2024-06-17 SDOH — HEALTH STABILITY: MENTAL HEALTH
STRESS IS WHEN SOMEONE FEELS TENSE, NERVOUS, ANXIOUS, OR CAN'T SLEEP AT NIGHT BECAUSE THEIR MIND IS TROUBLED. HOW STRESSED ARE YOU?: ONLY A LITTLE

## 2024-06-17 ASSESSMENT — LOCATION SIMPLE DESCRIPTION DERM
LOCATION SIMPLE: LEFT UPPER BACK
LOCATION SIMPLE: RIGHT LOWER BACK
LOCATION SIMPLE: LEFT ELBOW
LOCATION SIMPLE: LEFT EAR
LOCATION SIMPLE: RIGHT WRIST
LOCATION SIMPLE: LEFT HAND
LOCATION SIMPLE: ABDOMEN
LOCATION SIMPLE: RIGHT EAR
LOCATION SIMPLE: SCALP
LOCATION SIMPLE: RIGHT CHEEK
LOCATION SIMPLE: RIGHT FOREHEAD
LOCATION SIMPLE: RIGHT FOREARM
LOCATION SIMPLE: RIGHT HAND
LOCATION SIMPLE: LEFT FOREARM
LOCATION SIMPLE: RIGHT SCALP

## 2024-06-17 ASSESSMENT — LOCATION DETAILED DESCRIPTION DERM
LOCATION DETAILED: LEFT PROXIMAL DORSAL FOREARM
LOCATION DETAILED: LEFT SUPERIOR PARIETAL SCALP
LOCATION DETAILED: RIGHT LATERAL FRONTAL SCALP
LOCATION DETAILED: LEFT SUPERIOR HELIX
LOCATION DETAILED: RIGHT ULNAR DORSAL HAND
LOCATION DETAILED: LEFT LATERAL ANTECUBITAL SKIN
LOCATION DETAILED: LEFT SUPERIOR MEDIAL UPPER BACK
LOCATION DETAILED: RIGHT INFERIOR LATERAL MALAR CHEEK
LOCATION DETAILED: LEFT VENTRAL PROXIMAL FOREARM
LOCATION DETAILED: LEFT DISTAL DORSAL FOREARM
LOCATION DETAILED: RIGHT SUPERIOR HELIX
LOCATION DETAILED: EPIGASTRIC SKIN
LOCATION DETAILED: RIGHT SUPERIOR MEDIAL FOREHEAD
LOCATION DETAILED: RIGHT RADIAL DORSAL HAND
LOCATION DETAILED: RIGHT PROXIMAL DORSAL FOREARM
LOCATION DETAILED: RIGHT DORSAL WRIST
LOCATION DETAILED: RIGHT DISTAL RADIAL DORSAL FOREARM
LOCATION DETAILED: LEFT VENTRAL DISTAL FOREARM
LOCATION DETAILED: LEFT ULNAR DORSAL HAND
LOCATION DETAILED: LEFT VENTRAL LATERAL PROXIMAL FOREARM
LOCATION DETAILED: LEFT RADIAL DORSAL HAND
LOCATION DETAILED: RIGHT DISTAL DORSAL FOREARM
LOCATION DETAILED: RIGHT SUPERIOR LATERAL BUCCAL CHEEK
LOCATION DETAILED: RIGHT SUPERIOR MEDIAL MIDBACK

## 2024-06-17 ASSESSMENT — LOCATION ZONE DERM
LOCATION ZONE: FACE
LOCATION ZONE: HAND
LOCATION ZONE: EAR
LOCATION ZONE: SCALP
LOCATION ZONE: TRUNK
LOCATION ZONE: ARM

## 2024-06-17 ASSESSMENT — SOCIAL DETERMINANTS OF HEALTH (SDOH)
IN A TYPICAL WEEK, HOW MANY TIMES DO YOU TALK ON THE PHONE WITH FAMILY, FRIENDS, OR NEIGHBORS?: MORE THAN THREE TIMES A WEEK
IN A TYPICAL WEEK, HOW MANY TIMES DO YOU TALK ON THE PHONE WITH FAMILY, FRIENDS, OR NEIGHBORS?: MORE THAN THREE TIMES A WEEK
IN THE PAST 12 MONTHS, HAS THE ELECTRIC, GAS, OIL, OR WATER COMPANY THREATENED TO SHUT OFF SERVICE IN YOUR HOME?: NO
HOW OFTEN DO YOU GET TOGETHER WITH FRIENDS OR RELATIVES?: MORE THAN THREE TIMES A WEEK
HOW HARD IS IT FOR YOU TO PAY FOR THE VERY BASICS LIKE FOOD, HOUSING, MEDICAL CARE, AND HEATING?: NOT HARD AT ALL
HOW OFTEN DO YOU ATTEND CHURCH OR RELIGIOUS SERVICES?: NEVER
HOW OFTEN DO YOU ATTEND CHURCH OR RELIGIOUS SERVICES?: NEVER
HOW MANY DRINKS CONTAINING ALCOHOL DO YOU HAVE ON A TYPICAL DAY WHEN YOU ARE DRINKING: PATIENT DOES NOT DRINK
HOW OFTEN DO YOU HAVE A DRINK CONTAINING ALCOHOL: NEVER
HOW OFTEN DO YOU HAVE SIX OR MORE DRINKS ON ONE OCCASION: NEVER
HOW OFTEN DO YOU GET TOGETHER WITH FRIENDS OR RELATIVES?: MORE THAN THREE TIMES A WEEK
WITHIN THE PAST 12 MONTHS, YOU WORRIED THAT YOUR FOOD WOULD RUN OUT BEFORE YOU GOT THE MONEY TO BUY MORE: NEVER TRUE

## 2024-06-17 ASSESSMENT — LIFESTYLE VARIABLES
AUDIT-C TOTAL SCORE: 0
SKIP TO QUESTIONS 9-10: 1
HOW OFTEN DO YOU HAVE A DRINK CONTAINING ALCOHOL: NEVER
HOW MANY STANDARD DRINKS CONTAINING ALCOHOL DO YOU HAVE ON A TYPICAL DAY: PATIENT DOES NOT DRINK
HOW OFTEN DO YOU HAVE SIX OR MORE DRINKS ON ONE OCCASION: NEVER

## 2024-06-17 NOTE — PROCEDURE: INCISION AND DRAINAGE
Detail Level: Simple
Lesion Type: Cyst
Method: 11 blade
Curette: No
Size Of Lesion In Cm (Optional But May Be Required For Some Insurances): 0
Wound Care: Vaseline
Dressing: Band-Aid
Epidermal Sutures: 4-0 Ethilon
Epidermal Closure: simple interrupted
Suture Text: The incision was partially closed with
Preparation Text: The area was prepped in the usual clean fashion.
Curette Text (Optional): After the contents were expressed a curette was used to partially remove the cyst wall.
Consent was obtained and risks were reviewed including but not limited to delayed wound healing, infection, need for multiple I and D's, and pain.
Post-Care Instructions: I reviewed with the patient in detail post-care instructions. Patient should keep wound covered and call the office should any redness, pain, swelling or worsening occur.

## 2024-06-17 NOTE — PROCEDURE: LIQUID NITROGEN
Post-Care Instructions: I reviewed with the patient in detail post-care instructions. Patient is to wear sunprotection, and avoid picking at any of the treated lesions. Pt may apply Vaseline to crusted or scabbing areas.
Render Post-Care Instructions In Note?: no
Detail Level: Detailed
Consent: The patient's consent was obtained including but not limited to risks of crusting, scabbing, blistering, scarring, darker or lighter pigmentary change, recurrence, incomplete removal and infection.
Duration Of Freeze Thaw-Cycle (Seconds): 3
Number Of Freeze-Thaw Cycles: 2 freeze-thaw cycles
Show Aperture Variable?: Yes

## 2024-06-19 ENCOUNTER — APPOINTMENT (OUTPATIENT)
Dept: MEDICAL GROUP | Facility: PHYSICIAN GROUP | Age: 89
End: 2024-06-19
Payer: MEDICARE

## 2024-06-19 VITALS
TEMPERATURE: 98.2 F | SYSTOLIC BLOOD PRESSURE: 90 MMHG | HEIGHT: 62 IN | HEART RATE: 74 BPM | BODY MASS INDEX: 24.56 KG/M2 | DIASTOLIC BLOOD PRESSURE: 40 MMHG | WEIGHT: 133.44 LBS | OXYGEN SATURATION: 95 % | RESPIRATION RATE: 20 BRPM

## 2024-06-19 DIAGNOSIS — R19.7 DIARRHEA, UNSPECIFIED TYPE: ICD-10-CM

## 2024-06-19 DIAGNOSIS — R53.1 WEAKNESS GENERALIZED: ICD-10-CM

## 2024-06-19 PROCEDURE — 99213 OFFICE O/P EST LOW 20 MIN: CPT | Performed by: INTERNAL MEDICINE

## 2024-06-19 PROCEDURE — 3074F SYST BP LT 130 MM HG: CPT | Performed by: INTERNAL MEDICINE

## 2024-06-19 PROCEDURE — 3078F DIAST BP <80 MM HG: CPT | Performed by: INTERNAL MEDICINE

## 2024-06-19 ASSESSMENT — FIBROSIS 4 INDEX: FIB4 SCORE: 3.91

## 2024-06-25 DIAGNOSIS — R19.7 DIARRHEA, UNSPECIFIED TYPE: ICD-10-CM

## 2024-06-26 ENCOUNTER — HOSPITAL ENCOUNTER (OUTPATIENT)
Facility: MEDICAL CENTER | Age: 89
End: 2024-06-26
Attending: INTERNAL MEDICINE
Payer: MEDICARE

## 2024-06-26 DIAGNOSIS — R19.7 DIARRHEA, UNSPECIFIED TYPE: ICD-10-CM

## 2024-06-26 LAB
C DIFF DNA SPEC QL NAA+PROBE: NEGATIVE
C DIFF TOX GENS STL QL NAA+PROBE: NEGATIVE
C PARVUM AG STL QL IA.RAPID: NEGATIVE
G LAMBLIA AG STL QL IA.RAPID: NEGATIVE
LACTOFERRIN STL QL IA: POSITIVE

## 2024-06-26 PROCEDURE — 87899 AGENT NOS ASSAY W/OPTIC: CPT

## 2024-06-26 PROCEDURE — 87045 FECES CULTURE AEROBIC BACT: CPT

## 2024-06-26 PROCEDURE — 87328 CRYPTOSPORIDIUM AG IA: CPT

## 2024-06-26 PROCEDURE — 87329 GIARDIA AG IA: CPT

## 2024-06-26 PROCEDURE — 87046 STOOL CULTR AEROBIC BACT EA: CPT

## 2024-06-26 PROCEDURE — 87493 C DIFF AMPLIFIED PROBE: CPT

## 2024-06-26 PROCEDURE — 83630 LACTOFERRIN FECAL (QUAL): CPT

## 2024-06-27 ENCOUNTER — TELEPHONE (OUTPATIENT)
Dept: MEDICAL GROUP | Facility: PHYSICIAN GROUP | Age: 89
End: 2024-06-27
Payer: MEDICARE

## 2024-06-27 LAB
E COLI SXT1+2 STL IA: NORMAL
SIGNIFICANT IND 70042: NORMAL
SITE SITE: NORMAL
SOURCE SOURCE: NORMAL

## 2024-06-27 NOTE — TELEPHONE ENCOUNTER
[facility] facility   Want both verbal and written orders to administer Loperamide PRN. Daughter has already supplied it but they need you to write letter or something stating it was ok.     Thank you!    Marj's phone number: [phone]  Facility Fax: [phone]

## 2024-06-28 RX ORDER — LOPERAMIDE HYDROCHLORIDE 2 MG/1
2 CAPSULE ORAL 4 TIMES DAILY PRN
Qty: 30 CAPSULE | Refills: 2 | Status: SHIPPED
Start: 2024-06-28

## 2024-06-29 LAB
BACTERIA STL CULT: NORMAL
E COLI SXT1+2 STL IA: NORMAL
SIGNIFICANT IND 70042: NORMAL
SITE SITE: NORMAL
SOURCE SOURCE: NORMAL

## 2024-08-14 ENCOUNTER — HOSPITAL ENCOUNTER (OUTPATIENT)
Facility: MEDICAL CENTER | Age: 89
End: 2024-08-14
Attending: INTERNAL MEDICINE
Payer: MEDICARE

## 2024-08-14 DIAGNOSIS — R41.0 CONFUSION: ICD-10-CM

## 2024-08-14 LAB
APPEARANCE UR: CLEAR
BACTERIA #/AREA URNS HPF: NEGATIVE /HPF
BILIRUB UR QL STRIP.AUTO: NEGATIVE
COLOR UR: YELLOW
EPI CELLS #/AREA URNS HPF: NEGATIVE /HPF
GLUCOSE UR STRIP.AUTO-MCNC: NEGATIVE MG/DL
HYALINE CASTS #/AREA URNS LPF: ABNORMAL /LPF
KETONES UR STRIP.AUTO-MCNC: NEGATIVE MG/DL
LEUKOCYTE ESTERASE UR QL STRIP.AUTO: NEGATIVE
MICRO URNS: ABNORMAL
NITRITE UR QL STRIP.AUTO: NEGATIVE
PH UR STRIP.AUTO: 5.5 [PH] (ref 5–8)
PROT UR QL STRIP: 30 MG/DL
RBC # URNS HPF: ABNORMAL /HPF
RBC UR QL AUTO: NEGATIVE
SP GR UR STRIP.AUTO: 1.02
UROBILINOGEN UR STRIP.AUTO-MCNC: 0.2 MG/DL
WBC #/AREA URNS HPF: ABNORMAL /HPF

## 2024-08-14 PROCEDURE — 81001 URINALYSIS AUTO W/SCOPE: CPT

## 2024-08-15 ENCOUNTER — OFFICE VISIT (OUTPATIENT)
Dept: URGENT CARE | Facility: PHYSICIAN GROUP | Age: 89
End: 2024-08-15
Payer: MEDICARE

## 2024-08-15 VITALS
OXYGEN SATURATION: 97 % | TEMPERATURE: 97.3 F | SYSTOLIC BLOOD PRESSURE: 102 MMHG | RESPIRATION RATE: 16 BRPM | BODY MASS INDEX: 24.48 KG/M2 | DIASTOLIC BLOOD PRESSURE: 52 MMHG | HEIGHT: 62 IN | HEART RATE: 79 BPM | WEIGHT: 133 LBS

## 2024-08-15 DIAGNOSIS — R41.82 ALTERED MENTAL STATUS, UNSPECIFIED ALTERED MENTAL STATUS TYPE: ICD-10-CM

## 2024-08-15 PROCEDURE — 3078F DIAST BP <80 MM HG: CPT | Performed by: NURSE PRACTITIONER

## 2024-08-15 PROCEDURE — 3074F SYST BP LT 130 MM HG: CPT | Performed by: NURSE PRACTITIONER

## 2024-08-15 PROCEDURE — 99213 OFFICE O/P EST LOW 20 MIN: CPT | Performed by: NURSE PRACTITIONER

## 2024-08-15 ASSESSMENT — FIBROSIS 4 INDEX: FIB4 SCORE: 3.91

## 2024-08-15 NOTE — PROGRESS NOTES
"Verbal consent was acquired by the patient to use [employer] ambient listening note generation during this visit.    Subjective:     HPI:   History of Present Illness  The patient is a 89-year-old male with dementia. He is accompanied by his daughter here for evaluation of atypical behavior on Tuesday night.     On Tuesday night, he exhibited unusual behavior. He does not recall the incident, but his daughter reports that during a routine check by caregivers, he opened the door holding a paring knife. He did not use the knife, but later taped the lock to prevent anyone from entering. His daughter called him and had to intervene to get him to remove the tape. Patient does not remember the paring knife, but he does remember taping the lock and untaping the lock.  Since then, he has been cooperative and his behavior has returned to its usual state. He denies experiencing any pain or headaches.  He did have a UA performed ordered by PCP, which was negative for any infection. Patient reports no weakness. Daughter reports no noticeable weakness or other changes to his health.       Objective:     Exam:  /52   Pulse 79   Temp 36.3 °C (97.3 °F) (Temporal)   Resp 16   Ht 1.575 m (5' 2\")   Wt 60.3 kg (133 lb)   SpO2 97%   BMI 24.33 kg/m²  Body mass index is 24.33 kg/m².    Physical Exam  Vitals and nursing note reviewed.   Constitutional:       General: He is not in acute distress.     Appearance: Normal appearance. He is not ill-appearing.   HENT:      Head: Normocephalic and atraumatic.      Nose: Nose normal.   Cardiovascular:      Rate and Rhythm: Normal rate.      Pulses: Normal pulses.   Pulmonary:      Effort: Pulmonary effort is normal.   Musculoskeletal:      Cervical back: Normal range of motion.   Neurological:      General: No focal deficit present.      Mental Status: He is alert. Mental status is at baseline.      GCS: GCS eye subscore is 4. GCS verbal subscore is 5. GCS motor subscore is 6.      " Cranial Nerves: Cranial nerves 2-12 are intact.      Sensory: Sensation is intact.      Motor: Motor function is intact.      Coordination: Coordination is intact.      Gait: Gait is intact.      Comments: Patient has baseline dementia, but otherwise is symmetrically strong, PERRLA, smile is symmetric. No focal weakness. Walks well with his walker and looks well on exam today.           Assessment & Plan:     1. Altered mental status, unspecified altered mental status type            Assessment & Plan  1. Dementia, chronic and ongoing.  He exhibited unusual behavior on Tuesday night, including holding a knife and taping the door lock. His physical examination shows no signs of weakness or asymmetry, and his urine test was normal, ruling out a urinary infection. The episode does not appear to be neurological. Blood work was offered, but daughter declines as she believes her dad looks and acts well, and this is just a progression of his known dementia.  He is currently undergoing physical therapy and [facility] which has been helpful. It was discussed that as dementia progresses, such episodes might occur, but he is currently stable and cooperative.  Follow up with PCP for further management of his chronic dementia.                 JIMMY Villaseñor.      Please note that this dictation was created using voice recognition software. I have made every reasonable attempt to correct obvious errors, but I expect that there are errors of grammar and possibly content that I did not discover before finalizing the note.

## 2024-10-08 ENCOUNTER — OFFICE VISIT (OUTPATIENT)
Dept: URGENT CARE | Facility: PHYSICIAN GROUP | Age: 89
End: 2024-10-08
Payer: MEDICARE

## 2024-10-08 VITALS
HEART RATE: 66 BPM | WEIGHT: 134.8 LBS | DIASTOLIC BLOOD PRESSURE: 72 MMHG | OXYGEN SATURATION: 95 % | HEIGHT: 60 IN | BODY MASS INDEX: 26.46 KG/M2 | SYSTOLIC BLOOD PRESSURE: 118 MMHG | RESPIRATION RATE: 15 BRPM | TEMPERATURE: 96.7 F

## 2024-10-08 DIAGNOSIS — L30.9 DERMATITIS: ICD-10-CM

## 2024-10-08 PROCEDURE — 99213 OFFICE O/P EST LOW 20 MIN: CPT | Performed by: PHYSICIAN ASSISTANT

## 2024-10-08 PROCEDURE — 3078F DIAST BP <80 MM HG: CPT | Performed by: PHYSICIAN ASSISTANT

## 2024-10-08 PROCEDURE — 3074F SYST BP LT 130 MM HG: CPT | Performed by: PHYSICIAN ASSISTANT

## 2024-10-08 RX ORDER — HYDROCORTISONE 10 MG/ML
LOTION TOPICAL
Qty: 60 ML | Refills: 0 | Status: SHIPPED | OUTPATIENT
Start: 2024-10-08

## 2024-10-08 ASSESSMENT — ENCOUNTER SYMPTOMS
FOCAL WEAKNESS: 0
CHILLS: 0
FEVER: 0
TINGLING: 1

## 2024-10-08 ASSESSMENT — FIBROSIS 4 INDEX: FIB4 SCORE: 3.96

## 2024-11-14 ENCOUNTER — HOSPITAL ENCOUNTER (OUTPATIENT)
Dept: LAB | Facility: MEDICAL CENTER | Age: 89
End: 2024-11-14
Attending: PHYSICIAN ASSISTANT
Payer: MEDICARE

## 2024-11-14 LAB
PSA SERPL DL<=0.01 NG/ML-MCNC: 15.8 NG/ML (ref 0–4)
TESTOST SERPL-MCNC: 1016 NG/DL (ref 175–781)

## 2024-11-14 PROCEDURE — 84153 ASSAY OF PSA TOTAL: CPT

## 2024-11-14 PROCEDURE — 84403 ASSAY OF TOTAL TESTOSTERONE: CPT

## 2024-11-14 PROCEDURE — 36415 COLL VENOUS BLD VENIPUNCTURE: CPT

## 2024-12-03 SDOH — HEALTH STABILITY: PHYSICAL HEALTH: ON AVERAGE, HOW MANY MINUTES DO YOU ENGAGE IN EXERCISE AT THIS LEVEL?: 0 MIN

## 2024-12-03 SDOH — ECONOMIC STABILITY: FOOD INSECURITY: WITHIN THE PAST 12 MONTHS, THE FOOD YOU BOUGHT JUST DIDN'T LAST AND YOU DIDN'T HAVE MONEY TO GET MORE.: NEVER TRUE

## 2024-12-03 SDOH — ECONOMIC STABILITY: FOOD INSECURITY: WITHIN THE PAST 12 MONTHS, YOU WORRIED THAT YOUR FOOD WOULD RUN OUT BEFORE YOU GOT MONEY TO BUY MORE.: NEVER TRUE

## 2024-12-03 SDOH — ECONOMIC STABILITY: INCOME INSECURITY: IN THE LAST 12 MONTHS, WAS THERE A TIME WHEN YOU WERE NOT ABLE TO PAY THE MORTGAGE OR RENT ON TIME?: NO

## 2024-12-03 SDOH — HEALTH STABILITY: PHYSICAL HEALTH: ON AVERAGE, HOW MANY DAYS PER WEEK DO YOU ENGAGE IN MODERATE TO STRENUOUS EXERCISE (LIKE A BRISK WALK)?: 0 DAYS

## 2024-12-03 SDOH — ECONOMIC STABILITY: INCOME INSECURITY: HOW HARD IS IT FOR YOU TO PAY FOR THE VERY BASICS LIKE FOOD, HOUSING, MEDICAL CARE, AND HEATING?: NOT HARD AT ALL

## 2024-12-03 ASSESSMENT — SOCIAL DETERMINANTS OF HEALTH (SDOH)
HOW OFTEN DO YOU ATTEND CHURCH OR RELIGIOUS SERVICES?: NEVER
HOW OFTEN DO YOU GET TOGETHER WITH FRIENDS OR RELATIVES?: MORE THAN THREE TIMES A WEEK
IN A TYPICAL WEEK, HOW MANY TIMES DO YOU TALK ON THE PHONE WITH FAMILY, FRIENDS, OR NEIGHBORS?: MORE THAN THREE TIMES A WEEK
DO YOU BELONG TO ANY CLUBS OR ORGANIZATIONS SUCH AS CHURCH GROUPS UNIONS, FRATERNAL OR ATHLETIC GROUPS, OR SCHOOL GROUPS?: NO
HOW OFTEN DO YOU HAVE A DRINK CONTAINING ALCOHOL: NEVER
HOW MANY DRINKS CONTAINING ALCOHOL DO YOU HAVE ON A TYPICAL DAY WHEN YOU ARE DRINKING: PATIENT DOES NOT DRINK
HOW OFTEN DO YOU GET TOGETHER WITH FRIENDS OR RELATIVES?: MORE THAN THREE TIMES A WEEK
HOW OFTEN DO YOU ATTENT MEETINGS OF THE CLUB OR ORGANIZATION YOU BELONG TO?: NEVER
IN A TYPICAL WEEK, HOW MANY TIMES DO YOU TALK ON THE PHONE WITH FAMILY, FRIENDS, OR NEIGHBORS?: MORE THAN THREE TIMES A WEEK
WITHIN THE PAST 12 MONTHS, YOU WORRIED THAT YOUR FOOD WOULD RUN OUT BEFORE YOU GOT THE MONEY TO BUY MORE: NEVER TRUE
HOW HARD IS IT FOR YOU TO PAY FOR THE VERY BASICS LIKE FOOD, HOUSING, MEDICAL CARE, AND HEATING?: NOT HARD AT ALL
DO YOU BELONG TO ANY CLUBS OR ORGANIZATIONS SUCH AS CHURCH GROUPS UNIONS, FRATERNAL OR ATHLETIC GROUPS, OR SCHOOL GROUPS?: NO
HOW OFTEN DO YOU ATTEND CHURCH OR RELIGIOUS SERVICES?: NEVER
IN THE PAST 12 MONTHS, HAS THE ELECTRIC, GAS, OIL, OR WATER COMPANY THREATENED TO SHUT OFF SERVICE IN YOUR HOME?: NO
HOW OFTEN DO YOU HAVE SIX OR MORE DRINKS ON ONE OCCASION: NEVER
HOW OFTEN DO YOU ATTENT MEETINGS OF THE CLUB OR ORGANIZATION YOU BELONG TO?: NEVER

## 2024-12-03 ASSESSMENT — LIFESTYLE VARIABLES
AUDIT-C TOTAL SCORE: 0
SKIP TO QUESTIONS 9-10: 1
HOW OFTEN DO YOU HAVE SIX OR MORE DRINKS ON ONE OCCASION: NEVER
HOW MANY STANDARD DRINKS CONTAINING ALCOHOL DO YOU HAVE ON A TYPICAL DAY: PATIENT DOES NOT DRINK
HOW OFTEN DO YOU HAVE A DRINK CONTAINING ALCOHOL: NEVER

## 2024-12-05 ENCOUNTER — APPOINTMENT (OUTPATIENT)
Dept: MEDICAL GROUP | Age: 89
End: 2024-12-05
Payer: MEDICARE

## 2024-12-10 ENCOUNTER — OFFICE VISIT (OUTPATIENT)
Dept: MEDICAL GROUP | Age: 89
End: 2024-12-10
Payer: MEDICARE

## 2024-12-10 VITALS
DIASTOLIC BLOOD PRESSURE: 62 MMHG | BODY MASS INDEX: 25.52 KG/M2 | OXYGEN SATURATION: 97 % | TEMPERATURE: 97.3 F | HEART RATE: 62 BPM | WEIGHT: 130 LBS | SYSTOLIC BLOOD PRESSURE: 118 MMHG | HEIGHT: 60 IN

## 2024-12-10 DIAGNOSIS — Z00.00 WELLNESS EXAMINATION: ICD-10-CM

## 2024-12-10 DIAGNOSIS — F02.80 LATE ONSET ALZHEIMER'S DEMENTIA WITHOUT BEHAVIORAL DISTURBANCE, PSYCHOTIC DISTURBANCE, MOOD DISTURBANCE, OR ANXIETY, UNSPECIFIED DEMENTIA SEVERITY (HCC): Chronic | ICD-10-CM

## 2024-12-10 DIAGNOSIS — Z78.9 POLST (PHYSICIAN ORDERS FOR LIFE-SUSTAINING TREATMENT): ICD-10-CM

## 2024-12-10 DIAGNOSIS — G30.1 LATE ONSET ALZHEIMER'S DEMENTIA WITHOUT BEHAVIORAL DISTURBANCE, PSYCHOTIC DISTURBANCE, MOOD DISTURBANCE, OR ANXIETY, UNSPECIFIED DEMENTIA SEVERITY (HCC): Chronic | ICD-10-CM

## 2024-12-10 DIAGNOSIS — Z95.0 CARDIAC PACEMAKER IN SITU: ICD-10-CM

## 2024-12-10 DIAGNOSIS — C61 PROSTATE CANCER (HCC): Chronic | ICD-10-CM

## 2024-12-10 DIAGNOSIS — N39.41 URGE INCONTINENCE OF URINE: ICD-10-CM

## 2024-12-10 PROBLEM — S01.81XA FACIAL LACERATION: Status: RESOLVED | Noted: 2023-03-01 | Resolved: 2024-12-10

## 2024-12-10 PROBLEM — R41.0 DELIRIUM: Status: RESOLVED | Noted: 2023-03-02 | Resolved: 2024-12-10

## 2024-12-10 PROBLEM — Z02.9 ADMINISTRATIVE ENCOUNTER: Status: RESOLVED | Noted: 2024-02-26 | Resolved: 2024-12-10

## 2024-12-10 PROBLEM — R29.6 FREQUENT FALLS: Status: RESOLVED | Noted: 2023-03-01 | Resolved: 2024-12-10

## 2024-12-10 PROBLEM — S32.509A PUBIC BONE FRACTURE (HCC): Status: RESOLVED | Noted: 2023-03-01 | Resolved: 2024-12-10

## 2024-12-10 PROBLEM — E83.42 HYPOMAGNESEMIA: Status: RESOLVED | Noted: 2023-03-03 | Resolved: 2024-12-10

## 2024-12-10 PROBLEM — R03.0 ELEVATED BP WITHOUT DIAGNOSIS OF HYPERTENSION: Status: RESOLVED | Noted: 2023-03-03 | Resolved: 2024-12-10

## 2024-12-10 PROBLEM — E87.6 HYPOKALEMIA: Status: RESOLVED | Noted: 2023-03-03 | Resolved: 2024-12-10

## 2024-12-10 PROBLEM — R17 PAINLESS JAUNDICE: Status: RESOLVED | Noted: 2021-04-21 | Resolved: 2024-12-10

## 2024-12-10 PROBLEM — E53.8 VITAMIN B12 DEFICIENCY: Status: RESOLVED | Noted: 2023-03-03 | Resolved: 2024-12-10

## 2024-12-10 PROCEDURE — 99214 OFFICE O/P EST MOD 30 MIN: CPT | Performed by: STUDENT IN AN ORGANIZED HEALTH CARE EDUCATION/TRAINING PROGRAM

## 2024-12-10 PROCEDURE — 3078F DIAST BP <80 MM HG: CPT | Performed by: STUDENT IN AN ORGANIZED HEALTH CARE EDUCATION/TRAINING PROGRAM

## 2024-12-10 PROCEDURE — 3074F SYST BP LT 130 MM HG: CPT | Performed by: STUDENT IN AN ORGANIZED HEALTH CARE EDUCATION/TRAINING PROGRAM

## 2024-12-10 RX ORDER — ACETAMINOPHEN 120 MG/1
120 SUPPOSITORY RECTAL EVERY 4 HOURS PRN
COMMUNITY

## 2024-12-10 ASSESSMENT — ENCOUNTER SYMPTOMS
EYE PAIN: 0
FEVER: 0
NECK PAIN: 0
HEADACHES: 0
SHORTNESS OF BREATH: 0
CHILLS: 0
VOMITING: 0
TREMORS: 0
TINGLING: 0
COUGH: 0
SORE THROAT: 0
BLURRED VISION: 0
PALPITATIONS: 0
WEIGHT LOSS: 0
EYE DISCHARGE: 0
DIARRHEA: 0
BLOOD IN STOOL: 0
HEMOPTYSIS: 0
BACK PAIN: 0
NAUSEA: 0
DIZZINESS: 0
ORTHOPNEA: 0
DEPRESSION: 0
CONSTIPATION: 0
MYALGIAS: 0
ABDOMINAL PAIN: 0

## 2024-12-10 ASSESSMENT — FIBROSIS 4 INDEX: FIB4 SCORE: 3.96

## 2024-12-11 NOTE — PROGRESS NOTES
Verbal consent was acquired by the patient to use [email or web address] ambient listening note generation during this visit     Subjective:     HPI:   History of Present Illness  The patient is a 89-year-old male who is here to establish care. He is accompanied by his son.    He has been residing at [facility] for the past 2 years. He reports no complaints and maintains a generally good health status. He has a POLST form on his refrigerator and is a DNR/DNI. He has a durable power of  for healthcare from 2005, which states that he wishes to die naturally without heroic measures. He does not consume alcohol or smoke. He does not have home health care at this point.    His medication regimen includes Tylenol, taken as needed for a compressed fracture in his back, although he occasionally forgets to request it.    He was diagnosed with prostate cancer in 2012, for which he underwent radiation treatment. He is under the care of Dr. Tejeda at [facility], with biannual PSA tests. The most recent test was conducted last month. A bone scan in May revealed 2 small cancerous spots in the thoracic and pelvic areas, which do not appear to be growing. A follow-up bone scan is scheduled for May to monitor any potential growth.    He has a pacemaker, initially installed in 1999 following a syncopal episode, and subsequently replaced in 2008 and May 2019. Dr. Samuel informed him on 11/07/2024 that the pacemaker has approximately 2.5 years of battery life remaining. He is under the care of Dr. Samuel for this device.    He was diagnosed with Alzheimer's disease in March 2022 by neurologist Dr. Jason Hearn. His condition has remained stable, with only minor declines noted. He continues to function independently.    He has experienced a few falls since moving to [facility], but these incidents were more frequent when he lived at home and walked outside. He had a fall in the bathroom a few weeks ago but was able  to get up independently.    He has experienced urinary incontinence on a few occasions since moving to [facility], but this issue was more frequent prior to his move. He currently uses Depends.    Supplemental Information  He had gallstones a couple of years ago and was taken to the ER. He followed up with a gastroenterologist after that. They seemed to have passed or dissolved after that incident.    SOCIAL HISTORY  He does not drink alcohol. He does not smoke.    MEDICATIONS  Tylenol    Health Maintenance: Completed    Review of Systems   Constitutional:  Negative for chills, fever, malaise/fatigue and weight loss.   HENT:  Negative for congestion, ear pain, hearing loss and sore throat.    Eyes:  Negative for blurred vision, pain and discharge.   Respiratory:  Negative for cough, hemoptysis and shortness of breath.    Cardiovascular:  Negative for chest pain, palpitations and orthopnea.   Gastrointestinal:  Negative for abdominal pain, blood in stool, constipation, diarrhea, melena, nausea and vomiting.   Genitourinary:  Negative for dysuria, frequency, hematuria and urgency.   Musculoskeletal:  Negative for back pain, joint pain, myalgias and neck pain.   Skin:  Negative for rash.   Neurological:  Negative for dizziness, tingling, tremors and headaches.   Psychiatric/Behavioral:  Negative for depression and suicidal ideas.          Objective:     Exam:  /62 (BP Location: Left arm, Patient Position: Sitting, BP Cuff Size: Adult)   Pulse 62   Temp 36.3 °C (97.3 °F) (Temporal)   Ht 1.524 m (5')   Wt 59 kg (130 lb)   SpO2 97%   BMI 25.39 kg/m²  Body mass index is 25.39 kg/m².    Physical Exam  Constitutional:       General: He is not in acute distress.     Appearance: Normal appearance. He is normal weight. He is not ill-appearing, toxic-appearing or diaphoretic.   HENT:      Head: Normocephalic and atraumatic.      Right Ear: External ear normal.      Left Ear: External ear normal.      Nose: Nose  normal.      Mouth/Throat:      Mouth: Mucous membranes are moist.      Pharynx: Oropharynx is clear.   Eyes:      General:         Right eye: No discharge.         Left eye: No discharge.      Extraocular Movements: Extraocular movements intact.      Conjunctiva/sclera: Conjunctivae normal.      Pupils: Pupils are equal, round, and reactive to light.   Cardiovascular:      Rate and Rhythm: Normal rate and regular rhythm.      Pulses: Normal pulses.      Heart sounds: Normal heart sounds. No murmur heard.  Pulmonary:      Effort: No respiratory distress.      Breath sounds: No stridor. No wheezing.   Abdominal:      General: Abdomen is flat. Bowel sounds are normal. There is no distension.      Palpations: Abdomen is soft.      Tenderness: There is no abdominal tenderness. There is no right CVA tenderness or guarding.   Musculoskeletal:         General: No swelling, tenderness, deformity or signs of injury. Normal range of motion.      Cervical back: Normal range of motion and neck supple. No rigidity or tenderness.      Right lower leg: No edema.      Left lower leg: No edema.   Lymphadenopathy:      Cervical: No cervical adenopathy.   Skin:     General: Skin is warm and dry.      Capillary Refill: Capillary refill takes less than 2 seconds.      Coloration: Skin is not jaundiced or pale.      Findings: No bruising, erythema, lesion or rash.   Neurological:      General: No focal deficit present.      Mental Status: He is alert and oriented to person, place, and time. Mental status is at baseline.      Cranial Nerves: No cranial nerve deficit.      Sensory: No sensory deficit.      Motor: No weakness.      Coordination: Coordination normal.      Gait: Gait normal.      Deep Tendon Reflexes: Reflexes normal.   Psychiatric:         Mood and Affect: Mood normal.         Behavior: Behavior normal.         Thought Content: Thought content normal.         Judgment: Judgment normal.             Results  Imaging  Bone scan  done in May showed 2 spots of cancer in the thoracic area and the pelvic area.    Assessment & Plan:     1. Prostate cancer (HCC)        2. Wellness examination  CBC WITHOUT DIFFERENTIAL    HEMOGLOBIN A1C    Comp Metabolic Panel    TSH WITH REFLEX TO FT4      3. Late onset Alzheimer's dementia without behavioral disturbance, psychotic disturbance, mood disturbance, or anxiety, unspecified dementia severity (HCC)        4. Cardiac pacemaker in situ        5. Urge incontinence of urine        6. POLST (Physician Orders for Life-Sustaining Treatment)            Assessment & Plan  1. Prostate CA  He is here to establish care. He has a history of prostate cancer, diagnosed in 2012, and has undergone radiation treatment. He is under the care of Dr. Tejeda at [facility]. A bone scan in May 2024 showed 2 small spots of cancer in the thoracic and pelvic areas, which are currently stable. Another bone scan is scheduled for May 2025 to monitor for any growth.     2. Cardiac Pacemaker  He has a pacemaker, initially installed in 1999 due to syncope, and replaced in 2008 and again in May 2019. The pacemaker is being used more frequently than usual, with an estimated 2.5 years of battery life remaining as of November 7, 2024. He is followed by Dr. Samuel for the pacemaker.       3. Alzheimers  He was diagnosed with Alzheimer's disease in March 2022 by Dr. Jason Hearn, a neurologist. His condition is stable with incremental decline. He has experienced a couple of falls since living at [facility] but has not had frequent falls recently.     4. Ho compression back fractures  He uses Tylenol as needed for a compression fracture in his back.     5. Urinary urge incontinence   He has had some urinary incontinence episodes, which have decreased in frequency since moving to [facility]. He wears Depends to manage this issue. Non-fasting labs have been ordered to ensure he is not anemic and to check electrolyte levels. An A1c test  has also been ordered to monitor blood sugar levels. He will return in 6 to 8 weeks for a follow-up visit, with lab work to be completed 1 to 2 weeks prior.    Follow-up  The patient will follow up in 6 to 8 weeks.    PROCEDURE  The patient underwent radiation treatment for prostate cancer in 2012. A pacemaker was initially installed in 1999, replaced in 2008, and again in May 2019.          Return in about 3 months (around 3/10/2025) for annual visit.    Please note that this dictation was created using voice recognition software. I have made every reasonable attempt to correct obvious errors, but I expect that there are errors of grammar and possibly content that I did not discover before finalizing the note.

## 2024-12-18 ENCOUNTER — APPOINTMENT (OUTPATIENT)
Dept: URBAN - METROPOLITAN AREA CLINIC 22 | Facility: CLINIC | Age: 89
Setting detail: DERMATOLOGY
End: 2024-12-18

## 2024-12-18 DIAGNOSIS — L259 CONTACT DERMATITIS AND OTHER ECZEMA, UNSPECIFIED CAUSE: ICD-10-CM | Status: INADEQUATELY CONTROLLED

## 2024-12-18 DIAGNOSIS — Z71.89 OTHER SPECIFIED COUNSELING: ICD-10-CM

## 2024-12-18 DIAGNOSIS — L57.0 ACTINIC KERATOSIS: ICD-10-CM

## 2024-12-18 DIAGNOSIS — Z85.828 PERSONAL HISTORY OF OTHER MALIGNANT NEOPLASM OF SKIN: ICD-10-CM

## 2024-12-18 PROBLEM — L30.8 OTHER SPECIFIED DERMATITIS: Status: ACTIVE | Noted: 2024-12-18

## 2024-12-18 PROCEDURE — ? SUNSCREEN RECOMMENDATIONS

## 2024-12-18 PROCEDURE — 99214 OFFICE O/P EST MOD 30 MIN: CPT | Mod: 25

## 2024-12-18 PROCEDURE — ? LIQUID NITROGEN

## 2024-12-18 PROCEDURE — ? COUNSELING

## 2024-12-18 PROCEDURE — 17000 DESTRUCT PREMALG LESION: CPT

## 2024-12-18 PROCEDURE — ? TREATMENT REGIMEN

## 2024-12-18 PROCEDURE — ? PRESCRIPTION

## 2024-12-18 RX ORDER — TRIAMCINOLONE ACETONIDE 1 MG/G
CREAM TOPICAL BID
Qty: 80 | Refills: 1 | Status: ERX | COMMUNITY
Start: 2024-12-18

## 2024-12-18 RX ADMIN — TRIAMCINOLONE ACETONIDE: 1 CREAM TOPICAL at 00:00

## 2024-12-18 ASSESSMENT — LOCATION ZONE DERM
LOCATION ZONE: FACE
LOCATION ZONE: ARM
LOCATION ZONE: EAR
LOCATION ZONE: LEG

## 2024-12-18 ASSESSMENT — LOCATION DETAILED DESCRIPTION DERM
LOCATION DETAILED: RIGHT SUPERIOR HELIX
LOCATION DETAILED: LEFT FOREHEAD
LOCATION DETAILED: RIGHT DORSAL WRIST
LOCATION DETAILED: RIGHT PROXIMAL PRETIBIAL REGION
LOCATION DETAILED: LEFT PROXIMAL PRETIBIAL REGION

## 2024-12-18 ASSESSMENT — LOCATION SIMPLE DESCRIPTION DERM
LOCATION SIMPLE: RIGHT WRIST
LOCATION SIMPLE: LEFT FOREHEAD
LOCATION SIMPLE: RIGHT PRETIBIAL REGION
LOCATION SIMPLE: RIGHT EAR
LOCATION SIMPLE: LEFT PRETIBIAL REGION

## 2024-12-18 ASSESSMENT — SEVERITY ASSESSMENT 2020: SEVERITY 2020: MILD

## 2024-12-18 NOTE — PROCEDURE: LIQUID NITROGEN
Post-Care Instructions: I reviewed with the patient in detail post-care instructions. Patient is to wear sunprotection, and avoid picking at any of the treated lesions. Pt may apply Vaseline to crusted or scabbing areas.
Show Applicator Variable?: Yes
Duration Of Freeze Thaw-Cycle (Seconds): 3
Number Of Freeze-Thaw Cycles: 2 freeze-thaw cycles
Detail Level: Detailed
Render Post-Care Instructions In Note?: no
Consent: The patient's consent was obtained including but not limited to risks of crusting, scabbing, blistering, scarring, darker or lighter pigmentary change, recurrence, incomplete removal and infection.

## 2025-02-18 ENCOUNTER — OFFICE VISIT (OUTPATIENT)
Dept: MEDICAL GROUP | Age: OVER 89
End: 2025-02-18
Payer: MEDICARE

## 2025-02-18 VITALS
BODY MASS INDEX: 26.93 KG/M2 | HEIGHT: 60 IN | HEART RATE: 72 BPM | WEIGHT: 137.2 LBS | TEMPERATURE: 98.6 F | OXYGEN SATURATION: 97 % | DIASTOLIC BLOOD PRESSURE: 66 MMHG | SYSTOLIC BLOOD PRESSURE: 120 MMHG

## 2025-02-18 DIAGNOSIS — F02.80 LATE ONSET ALZHEIMER'S DEMENTIA WITHOUT BEHAVIORAL DISTURBANCE, PSYCHOTIC DISTURBANCE, MOOD DISTURBANCE, OR ANXIETY, UNSPECIFIED DEMENTIA SEVERITY (HCC): Chronic | ICD-10-CM

## 2025-02-18 DIAGNOSIS — G30.1 LATE ONSET ALZHEIMER'S DEMENTIA WITHOUT BEHAVIORAL DISTURBANCE, PSYCHOTIC DISTURBANCE, MOOD DISTURBANCE, OR ANXIETY, UNSPECIFIED DEMENTIA SEVERITY (HCC): Chronic | ICD-10-CM

## 2025-02-18 DIAGNOSIS — R44.3 HALLUCINATIONS: ICD-10-CM

## 2025-02-18 DIAGNOSIS — Z95.0 CARDIAC PACEMAKER IN SITU: ICD-10-CM

## 2025-02-18 LAB
APPEARANCE UR: NORMAL
BILIRUB UR STRIP-MCNC: NEGATIVE MG/DL
COLOR UR AUTO: NORMAL
GLUCOSE UR STRIP.AUTO-MCNC: NEGATIVE MG/DL
KETONES UR STRIP.AUTO-MCNC: NORMAL MG/DL
LEUKOCYTE ESTERASE UR QL STRIP.AUTO: NEGATIVE
NITRITE UR QL STRIP.AUTO: NEGATIVE
PH UR STRIP.AUTO: 5 [PH] (ref 5–8)
PROT UR QL STRIP: NORMAL MG/DL
RBC UR QL AUTO: NEGATIVE
SP GR UR STRIP.AUTO: 1.03
UROBILINOGEN UR STRIP-MCNC: 0.2 MG/DL

## 2025-02-18 PROCEDURE — 99213 OFFICE O/P EST LOW 20 MIN: CPT | Performed by: STUDENT IN AN ORGANIZED HEALTH CARE EDUCATION/TRAINING PROGRAM

## 2025-02-18 PROCEDURE — 3074F SYST BP LT 130 MM HG: CPT | Performed by: STUDENT IN AN ORGANIZED HEALTH CARE EDUCATION/TRAINING PROGRAM

## 2025-02-18 PROCEDURE — 3078F DIAST BP <80 MM HG: CPT | Performed by: STUDENT IN AN ORGANIZED HEALTH CARE EDUCATION/TRAINING PROGRAM

## 2025-02-18 PROCEDURE — 81002 URINALYSIS NONAUTO W/O SCOPE: CPT | Performed by: STUDENT IN AN ORGANIZED HEALTH CARE EDUCATION/TRAINING PROGRAM

## 2025-02-18 ASSESSMENT — PATIENT HEALTH QUESTIONNAIRE - PHQ9: CLINICAL INTERPRETATION OF PHQ2 SCORE: 0

## 2025-02-18 ASSESSMENT — FIBROSIS 4 INDEX: FIB4 SCORE: 3.96

## 2025-02-19 NOTE — PROGRESS NOTES
Verbal consent was acquired by the patient to use "[employer] ambient listening note generation during this visit     Subjective:     HPI:   History of Present Illness  The patient is a 89-year-old gentleman who was sent here from assisted living for memory issues.    History is reported by daughter in the presence of the patient.  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 He grew up [place] a farm in [place]. Yesterday, he exhibited disoriented behavior, such as sitting outside in his underwear at 7 AM, waiting for his cousin to pick him up. However, his condition improved today, with no significant memory issues reported since 10 AM. He is aware that he is at the doctor's office. He knows who the president is. He is not aware of the year but knows it is February and Tuesday. He reports no urinary difficulties but occasionally struggles to find a restroom in time. He maintains adequate hydration and reports no recent fevers or chills. He also reports no cough or runny nose, although he occasionally needs to blow his nose. He does not believe he is depressed and has a good social network within his assisted living facility. His appetite remains robust, although he sometimes skips breakfast. He consumes a variety of fluids, including water, coffee, lemonade, and orange juice. He also enjoys ice cream. He reports feeling well most of the time and does not use alcohol or tobacco. He has an annual appointment scheduled for 03/24/2025. A similar episode occurred a few months ago.    He has been experiencing leg cramps, which he describes as a sensation of pulling in the joints, but reports no associated pain. He occasionally takes Tylenol for pain management.    SOCIAL HISTORY  He does not drink or smoke. 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    MEDICATIONS  Tylenol    No n/v/d/c, fever, chills, sob, chest pain      Objective:     Exam:  /66 (BP Location: Left arm, Patient Position: Sitting, BP Cuff Size: Adult)   Pulse 72   Temp 37 °C (98.6 °F) (Temporal)   Ht 1.524 m (5')   Wt 62.2 kg (137 lb 3.2 oz)   SpO2 97%   BMI 26.80 kg/m²  Body mass index is 26.8 kg/m².    Physical Exam    Gen: nad, ambulating with a walker  HENT: ncat, EOMI  Resp: ctabl  Cardiac: rrr, no m  GI: nt/nd  Neuro: no focal deficits, cn 2-12 intact throughout, sensation to light touch intact throughout  Psych: appropriate mood and affect      Results  Laboratory Studies  Urinalysis is negative.    Assessment & Plan:     1. Hallucinations        2. Late onset Alzheimer's dementia without behavioral disturbance, psychotic disturbance, mood disturbance, or anxiety, unspecified dementia severity (HCC)        3. Cardiac pacemaker in situ            Assessment & Plan  1. Hallucinations  His urinalysis results were negative, suggesting that his memory issues could be attributed to various factors such as possible mild uri, dehydration, or low glucose levels. The possibility of an underlying depressive episode was also considered. He is currently at his baseline cognitive function. He was advised to maintain a healthy diet and engage in enjoyable activities. Non-fasting laboratory tests were ordered to be conducted a few weeks prior to his next appointment on 03/24/2025.    2. Dementia   Mild. Stable  He was diagnosed with Alzheimer's disease in March 2022 by Dr. Jason Hearn, a neurologist. His condition is stable with incremental decline.     3. Cardiac Pacemaker  He has a pacemaker, initially installed in 1999 due to syncope, and replaced in 2008 and again in May 2019. The pacemaker is being used more frequently than usual, with an estimated 2.5 years of battery life remaining as of November 7, 2024. He is followed by Dr. Samuel for the pacemaker.           Return in  about 4 weeks (around 3/18/2025) for annual visit.    Please note that this dictation was created using voice recognition software. I have made every reasonable attempt to correct obvious errors, but I expect that there are errors of grammar and possibly content that I did not discover before finalizing the note.

## 2025-02-24 ENCOUNTER — TELEPHONE (OUTPATIENT)
Dept: MEDICAL GROUP | Age: OVER 89
End: 2025-02-24
Payer: MEDICARE

## 2025-02-24 DIAGNOSIS — F02.80 LATE ONSET ALZHEIMER'S DEMENTIA WITHOUT BEHAVIORAL DISTURBANCE, PSYCHOTIC DISTURBANCE, MOOD DISTURBANCE, OR ANXIETY, UNSPECIFIED DEMENTIA SEVERITY (HCC): Chronic | ICD-10-CM

## 2025-02-24 DIAGNOSIS — G30.1 LATE ONSET ALZHEIMER'S DEMENTIA WITHOUT BEHAVIORAL DISTURBANCE, PSYCHOTIC DISTURBANCE, MOOD DISTURBANCE, OR ANXIETY, UNSPECIFIED DEMENTIA SEVERITY (HCC): Chronic | ICD-10-CM

## 2025-03-06 DIAGNOSIS — F02.80 LATE ONSET ALZHEIMER'S DEMENTIA WITHOUT BEHAVIORAL DISTURBANCE, PSYCHOTIC DISTURBANCE, MOOD DISTURBANCE, OR ANXIETY, UNSPECIFIED DEMENTIA SEVERITY (HCC): Chronic | ICD-10-CM

## 2025-03-06 DIAGNOSIS — G30.1 LATE ONSET ALZHEIMER'S DEMENTIA WITHOUT BEHAVIORAL DISTURBANCE, PSYCHOTIC DISTURBANCE, MOOD DISTURBANCE, OR ANXIETY, UNSPECIFIED DEMENTIA SEVERITY (HCC): Chronic | ICD-10-CM

## 2025-03-10 ENCOUNTER — HOSPITAL ENCOUNTER (OUTPATIENT)
Dept: LAB | Facility: MEDICAL CENTER | Age: OVER 89
End: 2025-03-10
Attending: STUDENT IN AN ORGANIZED HEALTH CARE EDUCATION/TRAINING PROGRAM
Payer: MEDICARE

## 2025-03-10 DIAGNOSIS — Z00.00 WELLNESS EXAMINATION: ICD-10-CM

## 2025-03-10 LAB
ALBUMIN SERPL BCP-MCNC: 3.9 G/DL (ref 3.2–4.9)
ALBUMIN/GLOB SERPL: 1.7 G/DL
ALP SERPL-CCNC: 55 U/L (ref 30–99)
ALT SERPL-CCNC: 10 U/L (ref 2–50)
ANION GAP SERPL CALC-SCNC: 9 MMOL/L (ref 7–16)
AST SERPL-CCNC: 21 U/L (ref 12–45)
BILIRUB SERPL-MCNC: 0.7 MG/DL (ref 0.1–1.5)
BUN SERPL-MCNC: 23 MG/DL (ref 8–22)
CALCIUM ALBUM COR SERPL-MCNC: 9.5 MG/DL (ref 8.5–10.5)
CALCIUM SERPL-MCNC: 9.4 MG/DL (ref 8.5–10.5)
CHLORIDE SERPL-SCNC: 110 MMOL/L (ref 96–112)
CO2 SERPL-SCNC: 25 MMOL/L (ref 20–33)
CREAT SERPL-MCNC: 1.2 MG/DL (ref 0.5–1.4)
ERYTHROCYTE [DISTWIDTH] IN BLOOD BY AUTOMATED COUNT: 53.1 FL (ref 35.9–50)
EST. AVERAGE GLUCOSE BLD GHB EST-MCNC: 103 MG/DL
GFR SERPLBLD CREATININE-BSD FMLA CKD-EPI: 57 ML/MIN/1.73 M 2
GLOBULIN SER CALC-MCNC: 2.3 G/DL (ref 1.9–3.5)
GLUCOSE SERPL-MCNC: 74 MG/DL (ref 65–99)
HBA1C MFR BLD: 5.2 % (ref 4–5.6)
HCT VFR BLD AUTO: 38 % (ref 42–52)
HGB BLD-MCNC: 12.4 G/DL (ref 14–18)
MCH RBC QN AUTO: 33.2 PG (ref 27–33)
MCHC RBC AUTO-ENTMCNC: 32.6 G/DL (ref 32.3–36.5)
MCV RBC AUTO: 101.9 FL (ref 81.4–97.8)
PLATELET # BLD AUTO: 166 K/UL (ref 164–446)
PMV BLD AUTO: 10.1 FL (ref 9–12.9)
POTASSIUM SERPL-SCNC: 4.4 MMOL/L (ref 3.6–5.5)
PROT SERPL-MCNC: 6.2 G/DL (ref 6–8.2)
RBC # BLD AUTO: 3.73 M/UL (ref 4.7–6.1)
SODIUM SERPL-SCNC: 144 MMOL/L (ref 135–145)
TSH SERPL DL<=0.005 MIU/L-ACNC: 1.56 UIU/ML (ref 0.38–5.33)
WBC # BLD AUTO: 4.2 K/UL (ref 4.8–10.8)

## 2025-03-10 PROCEDURE — 85027 COMPLETE CBC AUTOMATED: CPT | Mod: GZ

## 2025-03-10 PROCEDURE — 80053 COMPREHEN METABOLIC PANEL: CPT | Mod: GZ

## 2025-03-10 PROCEDURE — 84443 ASSAY THYROID STIM HORMONE: CPT

## 2025-03-10 PROCEDURE — 83036 HEMOGLOBIN GLYCOSYLATED A1C: CPT | Mod: GA,GZ

## 2025-03-10 PROCEDURE — 36415 COLL VENOUS BLD VENIPUNCTURE: CPT | Mod: GZ

## 2025-03-11 ENCOUNTER — RESULTS FOLLOW-UP (OUTPATIENT)
Dept: MEDICAL GROUP | Age: OVER 89
End: 2025-03-11
Payer: MEDICARE

## 2025-03-24 ENCOUNTER — OFFICE VISIT (OUTPATIENT)
Dept: MEDICAL GROUP | Age: OVER 89
End: 2025-03-24
Payer: MEDICARE

## 2025-03-24 VITALS
SYSTOLIC BLOOD PRESSURE: 106 MMHG | HEART RATE: 74 BPM | DIASTOLIC BLOOD PRESSURE: 62 MMHG | HEIGHT: 60 IN | WEIGHT: 138.6 LBS | TEMPERATURE: 97.9 F | OXYGEN SATURATION: 95 % | BODY MASS INDEX: 27.21 KG/M2

## 2025-03-24 DIAGNOSIS — Z00.00 ENCOUNTER FOR MEDICARE ANNUAL WELLNESS EXAM: ICD-10-CM

## 2025-03-24 DIAGNOSIS — Z95.0 CARDIAC PACEMAKER IN SITU: ICD-10-CM

## 2025-03-24 DIAGNOSIS — N17.9 AKI (ACUTE KIDNEY INJURY) (HCC): ICD-10-CM

## 2025-03-24 DIAGNOSIS — D51.9 ANEMIA DUE TO VITAMIN B12 DEFICIENCY, UNSPECIFIED B12 DEFICIENCY TYPE: ICD-10-CM

## 2025-03-24 DIAGNOSIS — G30.1 LATE ONSET ALZHEIMER'S DEMENTIA WITHOUT BEHAVIORAL DISTURBANCE, PSYCHOTIC DISTURBANCE, MOOD DISTURBANCE, OR ANXIETY, UNSPECIFIED DEMENTIA SEVERITY (HCC): Chronic | ICD-10-CM

## 2025-03-24 DIAGNOSIS — R26.89 BALANCE PROBLEM: ICD-10-CM

## 2025-03-24 DIAGNOSIS — F02.80 LATE ONSET ALZHEIMER'S DEMENTIA WITHOUT BEHAVIORAL DISTURBANCE, PSYCHOTIC DISTURBANCE, MOOD DISTURBANCE, OR ANXIETY, UNSPECIFIED DEMENTIA SEVERITY (HCC): Chronic | ICD-10-CM

## 2025-03-24 DIAGNOSIS — E78.5 DYSLIPIDEMIA: ICD-10-CM

## 2025-03-24 DIAGNOSIS — D61.818 PANCYTOPENIA (HCC): Chronic | ICD-10-CM

## 2025-03-24 DIAGNOSIS — S22.060D COMPRESSION FRACTURE OF T8 VERTEBRA WITH ROUTINE HEALING, SUBSEQUENT ENCOUNTER: ICD-10-CM

## 2025-03-24 DIAGNOSIS — C61 PROSTATE CANCER (HCC): Chronic | ICD-10-CM

## 2025-03-24 DIAGNOSIS — R71.8 ELEVATED MCV: ICD-10-CM

## 2025-03-24 DIAGNOSIS — R79.89 LOW VITAMIN D LEVEL: ICD-10-CM

## 2025-03-24 PROCEDURE — G0439 PPPS, SUBSEQ VISIT: HCPCS | Performed by: STUDENT IN AN ORGANIZED HEALTH CARE EDUCATION/TRAINING PROGRAM

## 2025-03-24 PROCEDURE — 3078F DIAST BP <80 MM HG: CPT | Performed by: STUDENT IN AN ORGANIZED HEALTH CARE EDUCATION/TRAINING PROGRAM

## 2025-03-24 PROCEDURE — 3074F SYST BP LT 130 MM HG: CPT | Performed by: STUDENT IN AN ORGANIZED HEALTH CARE EDUCATION/TRAINING PROGRAM

## 2025-03-24 ASSESSMENT — ACTIVITIES OF DAILY LIVING (ADL): BATHING_REQUIRES_ASSISTANCE: 0

## 2025-03-24 ASSESSMENT — PATIENT HEALTH QUESTIONNAIRE - PHQ9: CLINICAL INTERPRETATION OF PHQ2 SCORE: 0

## 2025-03-24 ASSESSMENT — ENCOUNTER SYMPTOMS: GENERAL WELL-BEING: GOOD

## 2025-03-24 ASSESSMENT — FIBROSIS 4 INDEX: FIB4 SCORE: 3.68

## 2025-03-24 NOTE — PROGRESS NOTES
Chief Complaint   Patient presents with    Annual Exam       HPI:  Chico Cedillo is a 89 y.o. here for Medicare Annual Wellness Visit     History of Present Illness  The patient is a 89-year-old gentleman here for a wellness check.    He has been focusing on improving his hydration status. His memory appears to be relatively stable, with no significant decline noted. He underwent cerumen removal last week, which was performed under microscopic guidance. He utilizes hearing aids, which he finds beneficial, and typically wears them during the day and removes them at night. His dietary preferences lean towards scrambled eggs, hash browns, and phillips, which he consumes at every meal. He resides in an assisted living facility where he participates in a physical therapy program called [facility]. This program was initiated by his previous primary care physician, Dr. Rose, approximately a year ago. He attended the program 3 times a week for about 4 to 5 months but discontinued it in November 2024. He has expressed a preference to resume this program and is seeking a referral. He is scheduled to see his cardiologist on Wednesday.    He experiences occasional pain from a compression fracture, which is managed with Tylenol. He is not currently taking vitamin D supplements.    He was diagnosed with prostate cancer in 2012 and underwent radiation therapy. A bone scan conducted in October 2024 revealed 2 small spots of possible cancer in the thoracic and pelvic regions. He has a follow-up appointment with Dr. Elkins on 05/21/2025 to monitor any growth.    MEDICATIONS  Current: Tylenol        Patient Active Problem List    Diagnosis Date Noted    Hallucinations 02/18/2025    Bilateral inguinal hernia 03/02/2023    Bladder stone 03/01/2023    Late onset Alzheimer dementia (HCC) 07/29/2022    Pancytopenia (HCC) 07/29/2022    Urge incontinence of urine 12/20/2021    Cholelithiasis 12/14/2021    POLST (Physician Orders for  Life-Sustaining Treatment) 07/28/2021    DNAR (do not attempt resuscitation) 04/21/2021    Dyslipidemia 12/04/2020    Compression fracture of T8 vertebra ([facility]) 12/04/2020    Prostate cancer ([facility]) 10/04/2016    Cardiac pacemaker in situ 05/09/2012       Current Outpatient Medications   Medication Sig Dispense Refill    acetaminophen (TYLENOL) 120 MG Suppos Insert 120 mg into the rectum every four hours as needed. As needed for back pain       No current facility-administered medications for this visit.          Current supplements as per medication list.     Allergies: Patient has no known allergies.    Current social contact/activities:      He  reports that he has never smoked. He has never used smokeless tobacco. He reports that he does not drink alcohol and does not use drugs.  Counseling given: Not Answered      ROS:    Gait: Uses a walker  Ostomy: No  Other tubes: No  Amputations: No  Chronic oxygen use: No  Last eye exam: 2 years ago (2023)  Wears hearing aids: Yes   : Reports urinary leakage during the last 6 months that has somewhat interfered with their daily activities or sleep.    Screening:    Depression Screening  Little interest or pleasure in doing things?  0 - not at all  Feeling down, depressed , or hopeless? 0 - not at all  Trouble falling or staying asleep, or sleeping too much?     Feeling tired or having little energy?     Poor appetite or overeating?     Feeling bad about yourself - or that you are a failure or have let yourself or your family down?    Trouble concentrating on things, such as reading the newspaper or watching television?    Moving or speaking so slowly that other people could have noticed.  Or the opposite - being so fidgety or restless that you have been moving around a lot more than usual?     Thoughts that you would be better off dead, or of hurting yourself?     Patient Health Questionnaire Score:      If depressive symptoms identified deferred to follow up visit unless  specifically addressed in assessment and plan.    Interpretation of PHQ-9 Total Score   Score Severity   1-4 No Depression   5-9 Mild Depression   10-14 Moderate Depression   15-19 Moderately Severe Depression   20-27 Severe Depression    Screening for Cognitive Impairment  Do you or any of your friends or family members have any concern about your memory? Yes  Three Minute Recall (Village, Kitchen, Baby) 0/3    Jorge clock face with all 12 numbers and set the hands to show 10 minutes past 11.  No    Cognitive concerns identified deferred for follow up unless specifically addressed in assessment and plan.    Fall Risk Assessment  Has the patient had two or more falls in the last year or any fall with injury in the last year?  Yes    Safety Assessment  Do you always wear your seatbelt?  Yes  Any changes to home needed to function safely? Yes  Difficulty hearing.  Yes  Patient counseled about all safety risks that were identified.    Functional Assessment ADLs  Are there any barriers preventing you from cooking for yourself or meeting nutritional needs?  Yes.    Are there any barriers preventing you from driving safely or obtaining transportation?  No.    Are there any barriers preventing you from using a telephone or calling for help?  No    Are there any barriers preventing you from shopping?  No. Short trip and assistance with driving   Are there any barriers preventing you from taking care of your own finances?  Yes    Are there any barriers preventing you from managing your medications?  No    Are there any barriers preventing you from showering, bathing or dressing yourself? No    Are there any barriers preventing you from doing housework or laundry? Yes    Are there any barriers preventing you from using the toilet?No    Are you currently engaging in any exercise or physical activity?  No.      Self-Assessment of Health  What is your perception of your health? Good    Do you sleep more than six hours a night?  Yes    In the past 7 days, how much did pain keep you from doing your normal work? Some Minor back pain   Do you spend quality time with family or friends (virtually or in person)? Yes    Do you usually eat a heart healthy diet that constists of a variety of fruits, vegetables, whole grains and fiber? No    Do you eat foods high in fat and/or Fast Food more than three times per week? No    How concerned are you that your medical conditions are not being well managed? Not at all    Are you worried that in the next 2 months, you may not have stable housing that you own, rent, or stay in as part of a household? No        Advance Care Planning  Do you have an Advance Directive, Living Will, Durable Power of , or POLST? Yes                 Health Maintenance Summary            Upcoming       Annual Wellness Visit (Yearly) Next due on 3/24/2026      03/24/2025  Visit Dx: Encounter for Medicare annual wellness exam    11/01/2021  Level of Service: [place] ANNUAL WELLNESS VISIT-INCLUDES PPPS SUBSEQUE*    11/19/2019  Visit Dx: Medicare annual wellness visit, subsequent    11/19/2019  Subsequent Annual Wellness Visit - Includes PPPS ()    10/04/2016  Visit Dx: Medicare annual wellness visit, subsequent     Only the first 5 history entries have been loaded, but more history exists.            IMM DTaP/Tdap/Td Vaccine (3 - Td or Tdap) Next due on 1/12/2033 01/12/2023  Imm Admin: Tdap Vaccine    10/03/2011  Imm Admin: Tdap Vaccine                      Completed or No Longer Recommended       Influenza Vaccine (Series Information) Completed      10/10/2024  Outside Immunization: Influenza Tri, Adj    10/04/2023  Imm Admin: Influenza Vaccine Adult HD    10/26/2022  Imm Admin: Influenza Vaccine Adult HD    09/29/2021  Imm Admin: Influenza, Unspecified - HISTORICAL DATA    10/12/2020  Imm Admin: Influenza Vaccine Adult HD      Only the first 5 history entries have been loaded, but more history exists.               Zoster (Shingles) Vaccines (Series Information) Completed      09/29/2021  Imm Admin: Zoster Vaccine Recombinant (RZV) (SHINGRIX)    07/28/2021  Imm Admin: Zoster Vaccine Recombinant (RZV) (SHINGRIX)              COVID-19 Vaccine (Series Information) Completed      08/29/2024  Imm Admin: Comirnaty (Covid-19 Vaccine, Mrna, [id number] Formula)    10/04/2023  Imm Admin: Comirnaty (Covid-19 Vaccine, Mrna, [id number] Formula)    05/12/2022  Imm Admin: MODERNA SARS-COV-2 VACCINE (12+)    10/11/2021  Imm Admin: PFIZER PURPLE CAP SARS-COV-2 VACCINATION (12+)    02/04/2021  Imm Admin: PFIZER PURPLE CAP SARS-COV-2 VACCINATION (12+)      Only the first 5 history entries have been loaded, but more history exists.              Pneumococcal Vaccine: 50+ Years (Series Information) Completed      01/23/2019  Imm Admin: Pneumococcal polysaccharide vaccine (PPSV-23)    01/10/2018  Imm Admin: Pneumococcal Conjugate Vaccine (Prevnar/PCV-13)    09/20/2016  Imm Admin: Pneumococcal Conjugate Vaccine (Prevnar/PCV-13)    02/26/2015  Imm Admin: Pneumococcal Conjugate Vaccine (Prevnar/PCV-13)              Hepatitis A Vaccine (Hep A) (Series Information) Aged Out      No completion history exists for this topic.              HPV Vaccines (Series Information) Aged Out     No completion history exists for this topic.              Polio Vaccine (Inactivated Polio) (Series Information) Aged Out     No completion history exists for this topic.              Meningococcal Immunization (Series Information) Aged Out     No completion history exists for this topic.              Hepatitis B Vaccine (Hep B)  Discontinued     No completion history exists for this topic.                            Patient Care Team:  Moe Sheikh M.D. as PCP - General (Family Medicine)  Delmar Tejeda M.D. as Consulting Physician (Urology)  Silvio Sherman M.D. as Consulting Physician (Radiation Therapy)  Sanjiv Samuel M.D. as Consulting Physician (Cardiovascular Disease  (Cardiology))      Social History     Tobacco Use    Smoking status: Never    Smokeless tobacco: Never   Vaping Use    Vaping status: Never Used   Substance Use Topics    Alcohol use: No     Alcohol/week: 0.0 oz    Drug use: No     Family History   Problem Relation Age of Onset    Heart Disease Father      He  has a past medical history of Cancer (HCC) (2011), Cataract (01/27/2021), Diarrhea, Pacemaker, and Urinary incontinence (01/27/2021).   Past Surgical History:   Procedure Laterality Date    CATARACT PHACO WITH IOL Right 2/10/2021    Procedure: PHACOEMULSIFICATION, CATARACT, WITH IOL INSERTION;  Surgeon: Ralph Saldivar M.D.;  Location: SURGERY SAME DAY [facility];  Service: Ophthalmology    OTHER      PACEMAKER INSERTION      TONSILLECTOMY      when he was 4 years old       Exam:   /62 (BP Location: Left arm, Patient Position: Sitting, BP Cuff Size: Small adult)   Pulse 74   Temp 36.6 °C (97.9 °F) (Temporal)   Ht 1.524 m (5')   Wt 62.9 kg (138 lb 9.6 oz)   SpO2 95%  Body mass index is 27.07 kg/m².    Hearing fair.    Dentition good  Alert, oriented in no acute distress.  Eye contact is good, speech goal directed, affect calm    Assessment and Plan. The following treatment and monitoring plan is recommended:    1. Encounter for Medicare annual wellness exam    2. Balance problem  - Referral to Physical Therapy    3. Cardiac pacemaker in situ  He has a pacemaker, initially installed in 1999 due to syncope, and replaced in 2008 and again in May 2019. The pacemaker is being used more frequently than usual, with an estimated 2.5 years of battery life remaining as of November 7, 2024. He is followed by Dr. Samuel for the pacemaker. Next appt 3/26/2025    4. Compression fracture of T8 vertebra with routine healing, subsequent encounter  Stable. Pt uses tylenol very rarely as needed. Advised to supplement with Vit D 2000 Iu's daily    5. Dyslipidemia    6. Late onset Alzheimer's dementia without behavioral  disturbance, psychotic disturbance, mood disturbance, or anxiety, unspecified dementia severity (HCC)  Mild. Stable  He was diagnosed with Alzheimer's disease in March 2022 by Dr. Jason Hearn, a neurologist. His condition is stable with incremental decline.     7. Pancytopenia (HCC)  8. Anemia, mild  9. Elevated MCV  Mildly anemic with MCV slightly above 100. Likely, due to poor diet. Advised to supplement with daily vit B complex as well as include Vit B12 rich foods. Will recheck Vit B12, CBC in 6 mo    10. Prostate cancer (HCC)  He has a history of prostate cancer, diagnosed in 2012, and has undergone radiation treatment. He is under the care of Dr. Tejeda at [facility]. A bone scan in October 2024 showed 2 small spots of cancer in the thoracic and pelvic areas, which are currently stable. Another bone scan is scheduled for May 2025 to monitor for any growth.     11. KENDRICK   Likely due to dehydration due to poor PO intake.     Services suggested: No services needed at this time  Health Care Screening: Age-appropriate preventive services recommended by USPTF and ACIP covered by Medicare were discussed today. Services ordered if indicated and agreed upon by the patient.  Referrals offered: Community-based lifestyle interventions to reduce health risks and promote self-management and wellness, fall prevention, nutrition, physical activity, tobacco-use cessation, weight loss, and mental health services as per orders if indicated.    Discussion today about general wellness and lifestyle habits:    Prevent falls and reduce trip hazards; Cautioned about securing or removing rugs.  Have a working fire alarm and carbon monoxide detector;   Engage in regular physical activity and social activities     Follow-up: Return in about 6 months (around 9/24/2025) for lab results.

## 2025-03-27 NOTE — Clinical Note
REFERRAL APPROVAL NOTICE         Sent on March 27, 2025                   Chico Mamadou  [address] [place] [address]                   Dear Mr. Cedillo,    After a careful review of the medical information and benefit coverage, [facility] has processed your referral. See below for additional details.    If applicable, you must be actively enrolled with your insurance for coverage of the authorized service. If you have any questions regarding your coverage, please contact your insurance directly.    REFERRAL INFORMATION   Referral #:  [id number]  Referred-To Provider    Referred-By Provider:  Physical Therapy    Moe Sheikh M.D.   [facility] [address] [place] [address]  [phone] [address] [place] [address]  [phone]    Referral Start Date:  03/24/2025  Referral End Date:   03/24/2026             SCHEDULING  If you do not already have an appointment, please call [phone] to make an appointment.     MORE INFORMATION  If you do not already have a [facility] account, sign up at: [email or web address]  You can access your medical information, make appointments, see lab results, billing information, and more.  If you have questions regarding this referral, please contact  the [facility] Referrals department at:             [phone]. Monday - Friday 8:00AM - 5:00PM.     Sincerely,    [facility]

## 2025-04-21 ENCOUNTER — NON-PROVIDER VISIT (OUTPATIENT)
Dept: MEDICAL GROUP | Age: OVER 89
End: 2025-04-21
Payer: MEDICARE

## 2025-04-21 PROCEDURE — 99999 PR NO CHARGE: CPT | Performed by: STUDENT IN AN ORGANIZED HEALTH CARE EDUCATION/TRAINING PROGRAM

## 2025-04-21 NOTE — PROGRESS NOTES
Chico Cedillo is a 89 y.o. male here for a non-provider visit for Ear Lavage    If abnormal was an in office provider notified today (if so, indicate provider)? Yes, Dr. Sheikh cleared both ears to make sure they looked good.     Routed to PCP? Yes

## 2025-05-07 ENCOUNTER — HOSPITAL ENCOUNTER (OUTPATIENT)
Dept: LAB | Facility: MEDICAL CENTER | Age: OVER 89
End: 2025-05-07
Attending: UROLOGY
Payer: MEDICARE

## 2025-05-07 LAB — PSA SERPL DL<=0.01 NG/ML-MCNC: 23.9 NG/ML (ref 0–4)

## 2025-05-07 PROCEDURE — 36415 COLL VENOUS BLD VENIPUNCTURE: CPT

## 2025-05-07 PROCEDURE — 84153 ASSAY OF PSA TOTAL: CPT

## 2025-06-16 ENCOUNTER — APPOINTMENT (OUTPATIENT)
Dept: URBAN - METROPOLITAN AREA CLINIC 22 | Facility: CLINIC | Age: OVER 89
Setting detail: DERMATOLOGY
End: 2025-06-16

## 2025-06-16 DIAGNOSIS — L81.4 OTHER MELANIN HYPERPIGMENTATION: ICD-10-CM

## 2025-06-16 DIAGNOSIS — D22 MELANOCYTIC NEVI: ICD-10-CM

## 2025-06-16 DIAGNOSIS — L82.1 OTHER SEBORRHEIC KERATOSIS: ICD-10-CM

## 2025-06-16 DIAGNOSIS — Z71.89 OTHER SPECIFIED COUNSELING: ICD-10-CM

## 2025-06-16 DIAGNOSIS — L57.0 ACTINIC KERATOSIS: ICD-10-CM

## 2025-06-16 DIAGNOSIS — Z85.828 PERSONAL HISTORY OF OTHER MALIGNANT NEOPLASM OF SKIN: ICD-10-CM

## 2025-06-16 PROBLEM — D22.5 MELANOCYTIC NEVI OF TRUNK: Status: ACTIVE | Noted: 2025-06-16

## 2025-06-16 PROCEDURE — ? COUNSELING

## 2025-06-16 PROCEDURE — ? SUNSCREEN RECOMMENDATIONS

## 2025-06-16 PROCEDURE — ? LIQUID NITROGEN

## 2025-06-16 ASSESSMENT — LOCATION SIMPLE DESCRIPTION DERM
LOCATION SIMPLE: LEFT HAND
LOCATION SIMPLE: NOSE
LOCATION SIMPLE: RIGHT EAR
LOCATION SIMPLE: RIGHT UPPER BACK
LOCATION SIMPLE: RIGHT WRIST
LOCATION SIMPLE: RIGHT FOREHEAD
LOCATION SIMPLE: LEFT EAR
LOCATION SIMPLE: RIGHT FOREARM
LOCATION SIMPLE: RIGHT HAND
LOCATION SIMPLE: LEFT FOREHEAD
LOCATION SIMPLE: LEFT FOREARM
LOCATION SIMPLE: LEFT UPPER BACK

## 2025-06-16 ASSESSMENT — LOCATION DETAILED DESCRIPTION DERM
LOCATION DETAILED: LEFT RADIAL DORSAL HAND
LOCATION DETAILED: RIGHT SUPERIOR UPPER BACK
LOCATION DETAILED: LEFT FOREHEAD
LOCATION DETAILED: LEFT MID-UPPER BACK
LOCATION DETAILED: RIGHT DORSAL WRIST
LOCATION DETAILED: RIGHT PROXIMAL RADIAL DORSAL FOREARM
LOCATION DETAILED: LEFT SUPERIOR HELIX
LOCATION DETAILED: RIGHT PROXIMAL DORSAL FOREARM
LOCATION DETAILED: NASAL TIP
LOCATION DETAILED: RIGHT INFERIOR MEDIAL FOREHEAD
LOCATION DETAILED: LEFT ULNAR DORSAL HAND
LOCATION DETAILED: LEFT SUPERIOR UPPER BACK
LOCATION DETAILED: RIGHT SUPERIOR HELIX
LOCATION DETAILED: RIGHT ULNAR DORSAL HAND
LOCATION DETAILED: LEFT DISTAL DORSAL FOREARM
LOCATION DETAILED: LEFT PROXIMAL DORSAL FOREARM

## 2025-06-16 ASSESSMENT — LOCATION ZONE DERM
LOCATION ZONE: ARM
LOCATION ZONE: TRUNK
LOCATION ZONE: EAR
LOCATION ZONE: FACE
LOCATION ZONE: NOSE
LOCATION ZONE: HAND

## 2025-06-16 NOTE — PROCEDURE: LIQUID NITROGEN
Post-Care Instructions: I reviewed with the patient in detail post-care instructions. Patient is to wear sunprotection, and avoid picking at any of the treated lesions. Pt may apply Vaseline to crusted or scabbing areas.
Duration Of Freeze Thaw-Cycle (Seconds): 3
Number Of Freeze-Thaw Cycles: 2 freeze-thaw cycles
Show Applicator Variable?: Yes
Render Note In Bullet Format When Appropriate: No
Detail Level: Detailed
Consent: The patient's consent was obtained including but not limited to risks of crusting, scabbing, blistering, scarring, darker or lighter pigmentary change, recurrence, incomplete removal and infection.
Application Tool (Optional): Cry-AC

## 2025-06-16 NOTE — PROCEDURE: SUNSCREEN RECOMMENDATIONS
Pelvic pain
Detail Level: Generalized
General Sunscreen Counseling: I recommended a broad spectrum sunscreen with a SPF of 30 or higher.  I explained that SPF 30 sunscreens block approximately 97 percent of the sun's harmful rays.  Sunscreens should be applied at least 15 minutes prior to expected sun exposure and then every 2 hours after that as long as sun exposure continues. If swimming or exercising sunscreen should be reapplied every 45 minutes to an hour after getting wet or sweating.  One ounce, or the equivalent of a shot glass full of sunscreen, is adequate to protect the skin not covered by a bathing suit. I also recommended a lip balm with a sunscreen as well. Sun protective clothing can be used in lieu of sunscreen but must be worn the entire time you are exposed to the sun's rays.

## 2025-08-26 DIAGNOSIS — G30.1 LATE ONSET ALZHEIMER'S DEMENTIA WITHOUT BEHAVIORAL DISTURBANCE, PSYCHOTIC DISTURBANCE, MOOD DISTURBANCE, OR ANXIETY, UNSPECIFIED DEMENTIA SEVERITY (HCC): Chronic | ICD-10-CM

## 2025-08-26 DIAGNOSIS — F02.80 LATE ONSET ALZHEIMER'S DEMENTIA WITHOUT BEHAVIORAL DISTURBANCE, PSYCHOTIC DISTURBANCE, MOOD DISTURBANCE, OR ANXIETY, UNSPECIFIED DEMENTIA SEVERITY (HCC): Chronic | ICD-10-CM

## 2025-08-26 DIAGNOSIS — S22.060D COMPRESSION FRACTURE OF T8 VERTEBRA WITH ROUTINE HEALING, SUBSEQUENT ENCOUNTER: Primary | ICD-10-CM

## 2025-08-26 RX ORDER — LACTOSE-REDUCED FOOD
1 LIQUID (ML) ORAL EVERY MORNING
Qty: 237 ML | Refills: 1 | Status: SHIPPED | OUTPATIENT
Start: 2025-08-26

## 2025-08-26 RX ORDER — ACETAMINOPHEN 500 MG
500-1000 TABLET ORAL EVERY 8 HOURS PRN
Qty: 60 TABLET | Refills: 3 | Status: SHIPPED | OUTPATIENT
Start: 2025-08-26

## 2025-10-06 ENCOUNTER — APPOINTMENT (OUTPATIENT)
Dept: MEDICAL GROUP | Age: OVER 89
End: 2025-10-06
Payer: MEDICARE

## (undated) DEVICE — CANISTER SUCTION RIGID RED 1500CC (40EA/CA)

## (undated) DEVICE — GOWN WARMING STANDARD FLEX - (30/CA)

## (undated) DEVICE — SHIELD OPTH AL GRTR CVR FOX (50EA/BX)

## (undated) DEVICE — SET EXTENSION WITH 2 PORTS (48EA/CA) ***PART #2C8610 IS A SUBSTITUTE*****

## (undated) DEVICE — LIGASURE 5MM BLUNT TIP LONG - 44CM (6EA/PK)

## (undated) DEVICE — TIP POLYMER I&A

## (undated) DEVICE — ELECTRODE 850 FOAM ADHESIVE - HYDROGEL RADIOTRNSPRNT (50/PK)

## (undated) DEVICE — SUTURE GENERAL

## (undated) DEVICE — SUCTION INSTRUMENT YANKAUER BULBOUS TIP W/O VENT (50EA/CA)

## (undated) DEVICE — KNIFE STEP 1.1 (6EA/BX)

## (undated) DEVICE — GLOVE BIOGEL ECLIPSE PF LATEX SIZE 8.0  (50PR/BX)

## (undated) DEVICE — RING MALYUGIN 7.0MM (6EA/BX)

## (undated) DEVICE — CANISTER SUCTION 3000ML MECHANICAL FILTER AUTO SHUTOFF MEDI-VAC NONSTERILE LF DISP  (40EA/CA)

## (undated) DEVICE — KIT ANESTHESIA W/CIRCUIT & 3/LT BAG W/FILTER (20EA/CA)

## (undated) DEVICE — GLOVE BIOGEL SZ 8.5 SURGICAL PF LTX - (50PR/BX 4BX/CA)

## (undated) DEVICE — SENSOR SPO2 NEO LNCS ADHESIVE (20/BX) SEE USER NOTES

## (undated) DEVICE — LACTATED RINGERS INJ 1000 ML - (14EA/CA 60CA/PF)

## (undated) DEVICE — CANNULA INJECTION 27G (EYE) - 10/BX ALCON

## (undated) DEVICE — CATHETER IV 20 GA X 1-1/4 ---SURG.& SDS ONLY--- (50EA/BX)

## (undated) DEVICE — SET LEADWIRE 5 LEAD BEDSIDE DISPOSABLE ECG (1SET OF 5/EA)

## (undated) DEVICE — GLOVE BIOGEL PI INDICATOR SZ 7.0 SURGICAL PF LF - (50/BX 4BX/CA)

## (undated) DEVICE — Device

## (undated) DEVICE — WATER IRRIGATION STERILE 1000ML (12EA/CA)

## (undated) DEVICE — SODIUM CHL IRRIGATION 0.9% 1000ML (12EA/CA)

## (undated) DEVICE — TUBING CLEARLINK DUO-VENT - C-FLO (48EA/CA)

## (undated) DEVICE — NEEDLE CYSTOTOME OPTH VSTC  0.4MM X 16MM - (10/CA)

## (undated) DEVICE — CARTRIDGE MONARCH C - (10EA/BX)

## (undated) DEVICE — KIT  I.V. START (100EA/CA)

## (undated) DEVICE — TUBE CONNECTING SUCTION - CLEAR PLASTIC STERILE 72 IN (50EA/CA)